# Patient Record
Sex: MALE | Race: WHITE | NOT HISPANIC OR LATINO | Employment: OTHER | URBAN - METROPOLITAN AREA
[De-identification: names, ages, dates, MRNs, and addresses within clinical notes are randomized per-mention and may not be internally consistent; named-entity substitution may affect disease eponyms.]

---

## 2023-04-07 ENCOUNTER — APPOINTMENT (OUTPATIENT)
Dept: CT IMAGING | Facility: HOSPITAL | Age: 48
DRG: 26 | End: 2023-04-07
Payer: COMMERCIAL

## 2023-04-07 ENCOUNTER — APPOINTMENT (OUTPATIENT)
Dept: MRI IMAGING | Facility: HOSPITAL | Age: 48
DRG: 26 | End: 2023-04-07
Payer: COMMERCIAL

## 2023-04-07 ENCOUNTER — HOSPITAL ENCOUNTER (INPATIENT)
Facility: HOSPITAL | Age: 48
LOS: 5 days | Discharge: HOME | DRG: 26 | End: 2023-04-12
Attending: EMERGENCY MEDICINE | Admitting: INTERNAL MEDICINE
Payer: COMMERCIAL

## 2023-04-07 ENCOUNTER — APPOINTMENT (OUTPATIENT)
Dept: GENERAL RADIOLOGY | Facility: HOSPITAL | Age: 48
DRG: 26 | End: 2023-04-07
Payer: COMMERCIAL

## 2023-04-07 ENCOUNTER — APPOINTMENT (OUTPATIENT)
Dept: NEUROLOGY | Facility: HOSPITAL | Age: 48
DRG: 26 | End: 2023-04-07
Payer: COMMERCIAL

## 2023-04-07 DIAGNOSIS — E87.6 HYPOKALEMIA: ICD-10-CM

## 2023-04-07 DIAGNOSIS — R56.9 NEW ONSET SEIZURE: Primary | ICD-10-CM

## 2023-04-07 DIAGNOSIS — Z74.09 IMPAIRED FUNCTIONAL MOBILITY, BALANCE, GAIT, AND ENDURANCE: ICD-10-CM

## 2023-04-07 DIAGNOSIS — R41.0 CONFUSION: ICD-10-CM

## 2023-04-07 DIAGNOSIS — D72.829 LEUKOCYTOSIS, UNSPECIFIED TYPE: ICD-10-CM

## 2023-04-07 DIAGNOSIS — G93.89 BRAIN MASS: ICD-10-CM

## 2023-04-07 PROBLEM — D84.9 IMMUNOSUPPRESSED STATUS: Status: ACTIVE | Noted: 2023-04-07

## 2023-04-07 PROBLEM — Z94.4 LIVER TRANSPLANTED: Status: ACTIVE | Noted: 2023-04-07

## 2023-04-07 LAB
ALBUMIN SERPL-MCNC: 4.2 G/DL (ref 3.5–5.2)
ALBUMIN/GLOB SERPL: 1.8 G/DL
ALP SERPL-CCNC: 48 U/L (ref 39–117)
ALT SERPL W P-5'-P-CCNC: 27 U/L (ref 1–41)
AMMONIA BLD-SCNC: 49 UMOL/L (ref 16–60)
AMPHET+METHAMPHET UR QL: NEGATIVE
AMPHETAMINES UR QL: NEGATIVE
ANION GAP SERPL CALCULATED.3IONS-SCNC: 17 MMOL/L (ref 5–15)
APAP SERPL-MCNC: <5 MCG/ML (ref 0–30)
APPEARANCE CSF: CLEAR
AST SERPL-CCNC: 24 U/L (ref 1–40)
B PARAPERT DNA SPEC QL NAA+PROBE: NOT DETECTED
B PERT DNA SPEC QL NAA+PROBE: NOT DETECTED
BACTERIA UR QL AUTO: NORMAL /HPF
BARBITURATES UR QL SCN: NEGATIVE
BASOPHILS # BLD AUTO: 0.04 10*3/MM3 (ref 0–0.2)
BASOPHILS NFR BLD AUTO: 0.2 % (ref 0–1.5)
BENZODIAZ UR QL SCN: NEGATIVE
BILIRUB SERPL-MCNC: 0.5 MG/DL (ref 0–1.2)
BILIRUB UR QL STRIP: NEGATIVE
BUN SERPL-MCNC: 12 MG/DL (ref 6–20)
BUN/CREAT SERPL: 11.1 (ref 7–25)
BUPRENORPHINE SERPL-MCNC: NEGATIVE NG/ML
C GATTII+NEOFOR DNA CSF QL NAA+NON-PROBE: NOT DETECTED
C PNEUM DNA NPH QL NAA+NON-PROBE: NOT DETECTED
CALCIUM SPEC-SCNC: 8.8 MG/DL (ref 8.6–10.5)
CANNABINOIDS SERPL QL: NEGATIVE
CHLORIDE SERPL-SCNC: 104 MMOL/L (ref 98–107)
CLARITY UR: ABNORMAL
CMV DNA CSF QL NAA+PROBE: NOT DETECTED
CO2 SERPL-SCNC: 19 MMOL/L (ref 22–29)
COCAINE UR QL: NEGATIVE
COLOR CSF: COLORLESS
COLOR SPUN CSF: COLORLESS
COLOR UR: YELLOW
CREAT SERPL-MCNC: 1.08 MG/DL (ref 0.76–1.27)
D-LACTATE SERPL-SCNC: 1.7 MMOL/L (ref 0.5–2)
D-LACTATE SERPL-SCNC: 4 MMOL/L (ref 0.5–2)
D-LACTATE SERPL-SCNC: 6.4 MMOL/L (ref 0.5–2)
DEPRECATED RDW RBC AUTO: 37.5 FL (ref 37–54)
E COLI K1 DNA CSF QL NAA+NON-PROBE: NOT DETECTED
EGFRCR SERPLBLD CKD-EPI 2021: 86.2 ML/MIN/1.73
EOSINOPHIL # BLD AUTO: 0.01 10*3/MM3 (ref 0–0.4)
EOSINOPHIL NFR BLD AUTO: 0.1 % (ref 0.3–6.2)
ERYTHROCYTE [DISTWIDTH] IN BLOOD BY AUTOMATED COUNT: 11.9 % (ref 12.3–15.4)
ETHANOL BLD-MCNC: <10 MG/DL (ref 0–10)
EV RNA CSF QL NAA+PROBE: NOT DETECTED
FLUAV SUBTYP SPEC NAA+PROBE: NOT DETECTED
FLUBV RNA ISLT QL NAA+PROBE: NOT DETECTED
GLOBULIN UR ELPH-MCNC: 2.3 GM/DL
GLUCOSE BLDC GLUCOMTR-MCNC: 83 MG/DL (ref 70–130)
GLUCOSE CSF-MCNC: 88 MG/DL (ref 40–70)
GLUCOSE SERPL-MCNC: 151 MG/DL (ref 65–99)
GLUCOSE UR STRIP-MCNC: NEGATIVE MG/DL
GP B STREP DNA SPEC QL NAA+PROBE: NOT DETECTED
HADV DNA SPEC NAA+PROBE: NOT DETECTED
HAEM INFLU SEROTYP DNA SPEC NAA+PROBE: NOT DETECTED
HCOV 229E RNA SPEC QL NAA+PROBE: NOT DETECTED
HCOV HKU1 RNA SPEC QL NAA+PROBE: NOT DETECTED
HCOV NL63 RNA SPEC QL NAA+PROBE: NOT DETECTED
HCOV OC43 RNA SPEC QL NAA+PROBE: NOT DETECTED
HCT VFR BLD AUTO: 46.4 % (ref 37.5–51)
HGB BLD-MCNC: 16.6 G/DL (ref 13–17.7)
HGB UR QL STRIP.AUTO: ABNORMAL
HHV6 DNA CSF QL NAA+PROBE: NOT DETECTED
HMPV RNA NPH QL NAA+NON-PROBE: NOT DETECTED
HOLD SPECIMEN: NORMAL
HPIV1 RNA ISLT QL NAA+PROBE: NOT DETECTED
HPIV2 RNA SPEC QL NAA+PROBE: NOT DETECTED
HPIV3 RNA NPH QL NAA+PROBE: NOT DETECTED
HPIV4 P GENE NPH QL NAA+PROBE: NOT DETECTED
HSV1 DNA CSF QL NAA+PROBE: NOT DETECTED
HSV2 DNA CSF QL NAA+PROBE: NOT DETECTED
HYALINE CASTS UR QL AUTO: NORMAL /LPF
IMM GRANULOCYTES # BLD AUTO: 0.1 10*3/MM3 (ref 0–0.05)
IMM GRANULOCYTES NFR BLD AUTO: 0.5 % (ref 0–0.5)
INR PPP: 1.08 (ref 0.84–1.13)
KETONES UR QL STRIP: ABNORMAL
L MONOCYTOG RRNA SPEC QL PROBE: NOT DETECTED
LEUKOCYTE ESTERASE UR QL STRIP.AUTO: NEGATIVE
LYMPHOCYTES # BLD AUTO: 0.55 10*3/MM3 (ref 0.7–3.1)
LYMPHOCYTES NFR BLD AUTO: 3 % (ref 19.6–45.3)
M PNEUMO IGG SER IA-ACNC: NOT DETECTED
MAGNESIUM SERPL-MCNC: 1.9 MG/DL (ref 1.6–2.6)
MCH RBC QN AUTO: 31 PG (ref 26.6–33)
MCHC RBC AUTO-ENTMCNC: 35.8 G/DL (ref 31.5–35.7)
MCV RBC AUTO: 86.7 FL (ref 79–97)
METHADONE UR QL SCN: NEGATIVE
MONOCYTES # BLD AUTO: 1.79 10*3/MM3 (ref 0.1–0.9)
MONOCYTES NFR BLD AUTO: 9.8 % (ref 5–12)
MUCOUS THREADS URNS QL MICRO: NORMAL /HPF
N MEN DNA SPEC QL NAA+PROBE: NOT DETECTED
NEUTROPHILS NFR BLD AUTO: 15.73 10*3/MM3 (ref 1.7–7)
NEUTROPHILS NFR BLD AUTO: 86.4 % (ref 42.7–76)
NITRITE UR QL STRIP: NEGATIVE
NRBC BLD AUTO-RTO: 0 /100 WBC (ref 0–0.2)
OPIATES UR QL: NEGATIVE
OXYCODONE UR QL SCN: NEGATIVE
PARECHOVIRUS A RNA CSF QL NAA+NON-PROBE: NOT DETECTED
PCP UR QL SCN: NEGATIVE
PH UR STRIP.AUTO: <=5 [PH] (ref 5–8)
PLATELET # BLD AUTO: 161 10*3/MM3 (ref 140–450)
PMV BLD AUTO: 12.6 FL (ref 6–12)
POTASSIUM SERPL-SCNC: 3.1 MMOL/L (ref 3.5–5.2)
PROCALCITONIN SERPL-MCNC: 0.03 NG/ML (ref 0–0.25)
PROPOXYPH UR QL: NEGATIVE
PROT CSF-MCNC: 30.7 MG/DL (ref 15–45)
PROT SERPL-MCNC: 6.5 G/DL (ref 6–8.5)
PROT UR QL STRIP: ABNORMAL
PROTHROMBIN TIME: 14 SECONDS (ref 11.4–14.4)
QT INTERVAL: 368 MS
QTC INTERVAL: 429 MS
RBC # BLD AUTO: 5.35 10*6/MM3 (ref 4.14–5.8)
RBC # CSF MANUAL: 18 /MM3 (ref 0–5)
RBC # UR STRIP: NORMAL /HPF
REF LAB TEST METHOD: NORMAL
RHINOVIRUS RNA SPEC NAA+PROBE: NOT DETECTED
RSV RNA NPH QL NAA+NON-PROBE: NOT DETECTED
S PNEUM DNA CSF QL NAA+NON-PROBE: NOT DETECTED
SALICYLATES SERPL-MCNC: <0.3 MG/DL
SARS-COV-2 RNA NPH QL NAA+NON-PROBE: NOT DETECTED
SODIUM SERPL-SCNC: 140 MMOL/L (ref 136–145)
SP GR UR STRIP: 1.02 (ref 1–1.03)
SPECIMEN VOL CSF: 8 ML
SQUAMOUS #/AREA URNS HPF: NORMAL /HPF
T4 FREE SERPL-MCNC: 1.22 NG/DL (ref 0.93–1.7)
TRANS CELLS #/AREA URNS HPF: NORMAL /HPF
TRICYCLICS UR QL SCN: NEGATIVE
TROPONIN T SERPL HS-MCNC: 9 NG/L
TSH SERPL DL<=0.05 MIU/L-ACNC: 1.35 UIU/ML (ref 0.27–4.2)
TUBE # CSF: 1
UROBILINOGEN UR QL STRIP: ABNORMAL
VZV DNA CSF QL NAA+PROBE: NOT DETECTED
WBC # CSF MANUAL: 0 /MM3 (ref 0–5)
WBC # UR STRIP: NORMAL /HPF
WBC NRBC COR # BLD: 18.22 10*3/MM3 (ref 3.4–10.8)
WHOLE BLOOD HOLD COAG: NORMAL
WHOLE BLOOD HOLD SPECIMEN: NORMAL

## 2023-04-07 PROCEDURE — 80306 DRUG TEST PRSMV INSTRMNT: CPT | Performed by: EMERGENCY MEDICINE

## 2023-04-07 PROCEDURE — G0480 DRUG TEST DEF 1-7 CLASSES: HCPCS | Performed by: PSYCHIATRY & NEUROLOGY

## 2023-04-07 PROCEDURE — 82945 GLUCOSE OTHER FLUID: CPT | Performed by: EMERGENCY MEDICINE

## 2023-04-07 PROCEDURE — 80053 COMPREHEN METABOLIC PANEL: CPT | Performed by: EMERGENCY MEDICINE

## 2023-04-07 PROCEDURE — 85610 PROTHROMBIN TIME: CPT | Performed by: EMERGENCY MEDICINE

## 2023-04-07 PROCEDURE — 87798 DETECT AGENT NOS DNA AMP: CPT | Performed by: PSYCHIATRY & NEUROLOGY

## 2023-04-07 PROCEDURE — 80197 ASSAY OF TACROLIMUS: CPT | Performed by: INTERNAL MEDICINE

## 2023-04-07 PROCEDURE — 80179 DRUG ASSAY SALICYLATE: CPT | Performed by: EMERGENCY MEDICINE

## 2023-04-07 PROCEDURE — 93005 ELECTROCARDIOGRAM TRACING: CPT | Performed by: EMERGENCY MEDICINE

## 2023-04-07 PROCEDURE — 70553 MRI BRAIN STEM W/O & W/DYE: CPT

## 2023-04-07 PROCEDURE — 83735 ASSAY OF MAGNESIUM: CPT | Performed by: EMERGENCY MEDICINE

## 2023-04-07 PROCEDURE — 89050 BODY FLUID CELL COUNT: CPT | Performed by: EMERGENCY MEDICINE

## 2023-04-07 PROCEDURE — 84145 PROCALCITONIN (PCT): CPT | Performed by: EMERGENCY MEDICINE

## 2023-04-07 PROCEDURE — 83605 ASSAY OF LACTIC ACID: CPT | Performed by: EMERGENCY MEDICINE

## 2023-04-07 PROCEDURE — 87327 CRYPTOCOCCUS NEOFORM AG IA: CPT | Performed by: PHYSICIAN ASSISTANT

## 2023-04-07 PROCEDURE — 70450 CT HEAD/BRAIN W/O DYE: CPT

## 2023-04-07 PROCEDURE — 82077 ASSAY SPEC XCP UR&BREATH IA: CPT | Performed by: EMERGENCY MEDICINE

## 2023-04-07 PROCEDURE — 84443 ASSAY THYROID STIM HORMONE: CPT | Performed by: EMERGENCY MEDICINE

## 2023-04-07 PROCEDURE — 0202U NFCT DS 22 TRGT SARS-COV-2: CPT | Performed by: EMERGENCY MEDICINE

## 2023-04-07 PROCEDURE — 36415 COLL VENOUS BLD VENIPUNCTURE: CPT

## 2023-04-07 PROCEDURE — 80184 ASSAY OF PHENOBARBITAL: CPT | Performed by: PSYCHIATRY & NEUROLOGY

## 2023-04-07 PROCEDURE — 0 GADOBENATE DIMEGLUMINE 529 MG/ML SOLUTION: Performed by: EMERGENCY MEDICINE

## 2023-04-07 PROCEDURE — 87483 CNS DNA AMP PROBE TYPE 12-25: CPT | Performed by: PSYCHIATRY & NEUROLOGY

## 2023-04-07 PROCEDURE — A9577 INJ MULTIHANCE: HCPCS | Performed by: EMERGENCY MEDICINE

## 2023-04-07 PROCEDURE — 84157 ASSAY OF PROTEIN OTHER: CPT | Performed by: EMERGENCY MEDICINE

## 2023-04-07 PROCEDURE — 87070 CULTURE OTHR SPECIMN AEROBIC: CPT | Performed by: EMERGENCY MEDICINE

## 2023-04-07 PROCEDURE — 80143 DRUG ASSAY ACETAMINOPHEN: CPT | Performed by: EMERGENCY MEDICINE

## 2023-04-07 PROCEDURE — 87205 SMEAR GRAM STAIN: CPT | Performed by: EMERGENCY MEDICINE

## 2023-04-07 PROCEDURE — 25010000002 DEXAMETHASONE PER 1 MG: Performed by: INTERNAL MEDICINE

## 2023-04-07 PROCEDURE — 71045 X-RAY EXAM CHEST 1 VIEW: CPT

## 2023-04-07 PROCEDURE — 009U3ZX DRAINAGE OF SPINAL CANAL, PERCUTANEOUS APPROACH, DIAGNOSTIC: ICD-10-PCS | Performed by: EMERGENCY MEDICINE

## 2023-04-07 PROCEDURE — 99223 1ST HOSP IP/OBS HIGH 75: CPT | Performed by: INTERNAL MEDICINE

## 2023-04-07 PROCEDURE — 63710000001 TACROLIMUS PER 1 MG: Performed by: INTERNAL MEDICINE

## 2023-04-07 PROCEDURE — 99285 EMERGENCY DEPT VISIT HI MDM: CPT

## 2023-04-07 PROCEDURE — 80179 DRUG ASSAY SALICYLATE: CPT | Performed by: PSYCHIATRY & NEUROLOGY

## 2023-04-07 PROCEDURE — 82140 ASSAY OF AMMONIA: CPT | Performed by: EMERGENCY MEDICINE

## 2023-04-07 PROCEDURE — 99233 SBSQ HOSP IP/OBS HIGH 50: CPT | Performed by: PSYCHIATRY & NEUROLOGY

## 2023-04-07 PROCEDURE — 0 LEVETIRACETAM IN NACL 0.75% 1000 MG/100ML SOLUTION: Performed by: INTERNAL MEDICINE

## 2023-04-07 PROCEDURE — 84439 ASSAY OF FREE THYROXINE: CPT | Performed by: EMERGENCY MEDICINE

## 2023-04-07 PROCEDURE — 84484 ASSAY OF TROPONIN QUANT: CPT | Performed by: EMERGENCY MEDICINE

## 2023-04-07 PROCEDURE — 85025 COMPLETE CBC W/AUTO DIFF WBC: CPT | Performed by: EMERGENCY MEDICINE

## 2023-04-07 PROCEDURE — 87015 SPECIMEN INFECT AGNT CONCNTJ: CPT | Performed by: EMERGENCY MEDICINE

## 2023-04-07 PROCEDURE — 95819 EEG AWAKE AND ASLEEP: CPT

## 2023-04-07 PROCEDURE — 82962 GLUCOSE BLOOD TEST: CPT

## 2023-04-07 PROCEDURE — 81001 URINALYSIS AUTO W/SCOPE: CPT | Performed by: EMERGENCY MEDICINE

## 2023-04-07 PROCEDURE — 87040 BLOOD CULTURE FOR BACTERIA: CPT | Performed by: EMERGENCY MEDICINE

## 2023-04-07 RX ORDER — SODIUM CHLORIDE 9 MG/ML
40 INJECTION, SOLUTION INTRAVENOUS AS NEEDED
Status: DISCONTINUED | OUTPATIENT
Start: 2023-04-07 | End: 2023-04-11

## 2023-04-07 RX ORDER — LEVETIRACETAM 10 MG/ML
1000 INJECTION INTRAVASCULAR ONCE
Status: COMPLETED | OUTPATIENT
Start: 2023-04-07 | End: 2023-04-07

## 2023-04-07 RX ORDER — LIDOCAINE HYDROCHLORIDE AND EPINEPHRINE 10; 10 MG/ML; UG/ML
10 INJECTION, SOLUTION INFILTRATION; PERINEURAL ONCE
Status: COMPLETED | OUTPATIENT
Start: 2023-04-07 | End: 2023-04-07

## 2023-04-07 RX ORDER — TACROLIMUS 0.5 MG/1
1.5 CAPSULE ORAL 2 TIMES DAILY
COMMUNITY

## 2023-04-07 RX ORDER — ONDANSETRON 2 MG/ML
4 INJECTION INTRAMUSCULAR; INTRAVENOUS EVERY 6 HOURS PRN
Status: DISCONTINUED | OUTPATIENT
Start: 2023-04-07 | End: 2023-04-11 | Stop reason: SDUPTHER

## 2023-04-07 RX ORDER — SODIUM CHLORIDE 0.9 % (FLUSH) 0.9 %
10 SYRINGE (ML) INJECTION AS NEEDED
Status: DISCONTINUED | OUTPATIENT
Start: 2023-04-07 | End: 2023-04-11 | Stop reason: SDUPTHER

## 2023-04-07 RX ORDER — LIDOCAINE HYDROCHLORIDE AND EPINEPHRINE 10; 10 MG/ML; UG/ML
20 INJECTION, SOLUTION INFILTRATION; PERINEURAL ONCE
Status: DISCONTINUED | OUTPATIENT
Start: 2023-04-07 | End: 2023-04-07

## 2023-04-07 RX ORDER — DEXAMETHASONE SODIUM PHOSPHATE 4 MG/ML
4 INJECTION, SOLUTION INTRA-ARTICULAR; INTRALESIONAL; INTRAMUSCULAR; INTRAVENOUS; SOFT TISSUE EVERY 6 HOURS
Status: DISCONTINUED | OUTPATIENT
Start: 2023-04-07 | End: 2023-04-08

## 2023-04-07 RX ORDER — IBUPROFEN 200 MG
400 TABLET ORAL EVERY 6 HOURS PRN
COMMUNITY
End: 2023-04-12 | Stop reason: HOSPADM

## 2023-04-07 RX ORDER — MULTIPLE VITAMINS W/ MINERALS TAB 9MG-400MCG
1 TAB ORAL DAILY
COMMUNITY

## 2023-04-07 RX ORDER — LORAZEPAM 2 MG/ML
2 INJECTION INTRAMUSCULAR
Status: DISCONTINUED | OUTPATIENT
Start: 2023-04-07 | End: 2023-04-12

## 2023-04-07 RX ORDER — LORAZEPAM 2 MG/ML
1 INJECTION INTRAMUSCULAR
Status: DISCONTINUED | OUTPATIENT
Start: 2023-04-07 | End: 2023-04-10 | Stop reason: ALTCHOICE

## 2023-04-07 RX ORDER — POTASSIUM CHLORIDE 1.5 G/1.77G
20 POWDER, FOR SOLUTION ORAL ONCE
Status: DISCONTINUED | OUTPATIENT
Start: 2023-04-07 | End: 2023-04-07

## 2023-04-07 RX ORDER — POTASSIUM CHLORIDE 750 MG/1
20 CAPSULE, EXTENDED RELEASE ORAL ONCE
Status: COMPLETED | OUTPATIENT
Start: 2023-04-07 | End: 2023-04-07

## 2023-04-07 RX ORDER — ACETAMINOPHEN 325 MG/1
650 TABLET ORAL EVERY 4 HOURS PRN
Status: DISCONTINUED | OUTPATIENT
Start: 2023-04-07 | End: 2023-04-12

## 2023-04-07 RX ORDER — LEVETIRACETAM 500 MG/1
1000 TABLET ORAL EVERY 12 HOURS SCHEDULED
Status: DISCONTINUED | OUTPATIENT
Start: 2023-04-08 | End: 2023-04-12 | Stop reason: HOSPADM

## 2023-04-07 RX ORDER — ASPIRIN 81 MG/1
81 TABLET ORAL DAILY
COMMUNITY
End: 2023-04-12 | Stop reason: HOSPADM

## 2023-04-07 RX ORDER — SODIUM CHLORIDE 0.9 % (FLUSH) 0.9 %
10 SYRINGE (ML) INJECTION AS NEEDED
Status: DISCONTINUED | OUTPATIENT
Start: 2023-04-07 | End: 2023-04-11

## 2023-04-07 RX ORDER — SODIUM CHLORIDE 0.9 % (FLUSH) 0.9 %
10 SYRINGE (ML) INJECTION EVERY 12 HOURS SCHEDULED
Status: DISCONTINUED | OUTPATIENT
Start: 2023-04-07 | End: 2023-04-11

## 2023-04-07 RX ADMIN — Medication 10 ML: at 19:13

## 2023-04-07 RX ADMIN — GADOBENATE DIMEGLUMINE 20 ML: 529 INJECTION, SOLUTION INTRAVENOUS at 16:17

## 2023-04-07 RX ADMIN — DEXAMETHASONE SODIUM PHOSPHATE 4 MG: 4 INJECTION INTRA-ARTICULAR; INTRALESIONAL; INTRAMUSCULAR; INTRAVENOUS; SOFT TISSUE at 19:14

## 2023-04-07 RX ADMIN — LEVETIRACETAM 1000 MG: 10 INJECTION INTRAVENOUS at 19:15

## 2023-04-07 RX ADMIN — POTASSIUM CHLORIDE 20 MEQ: 10 CAPSULE, COATED, EXTENDED RELEASE ORAL at 22:06

## 2023-04-07 RX ADMIN — LIDOCAINE HYDROCHLORIDE AND EPINEPHRINE 10 ML: 10; 10 INJECTION, SOLUTION INFILTRATION; PERINEURAL at 13:45

## 2023-04-07 RX ADMIN — TACROLIMUS 1.5 MG: 0.5 CAPSULE ORAL at 19:15

## 2023-04-07 NOTE — Clinical Note
Level of Care: Telemetry [5]   Diagnosis: Seizure [[id number]]   Admitting Physician: SUDHEER MELÉNDEZ [[id number]]   Attending Physician: SUDHEER MELÉNDEZ [[id number]]

## 2023-04-07 NOTE — ED PROVIDER NOTES
Subjective   History of Present Illness  47-year-old male presents for evaluation of altered mental status and new onset seizure.  Of note, the patient is currently confused and is a very limited historian.  According to EMS personnel, the patient is traveling from [place].  He was in [place], [place] earlier this morning visiting family for Nancy when he had new onset seizure witnessed by his wife.  He was reportedly quite confused following the event.  He was flown here from scene.  On EMS arrival, the patient was noted to be very confused and markedly tachycardic.  His mental status improved on the way to our facility.  He was not hypoglycemic.  Awaiting arrival of the patient's wife to further corroborate his history.  According to EMS, the patient has not been well for the past few days and has been confused when compared to his baseline.  His past medical history is unclear at this time.        Review of Systems   Unable to perform ROS: Mental status change   Neurological: Positive for dizziness, seizures and headaches.   Psychiatric/Behavioral: Positive for confusion.   All other systems reviewed and are negative.      Past Medical History:   Diagnosis Date   • Liver cancer        No Known Allergies    History reviewed. No pertinent surgical history.    History reviewed. No pertinent family history.    Social History     Socioeconomic History   • Marital status:            Objective   Physical Exam  Vitals and nursing note reviewed.   Constitutional:       General: He is not in acute distress.     Appearance: He is well-developed. He is not diaphoretic.      Comments: Confused appearing male   HENT:      Head: Normocephalic and atraumatic.      Comments: No tongue laceration present  Eyes:      Pupils: Pupils are equal, round, and reactive to light.   Neck:      Vascular: No JVD.      Comments: No meningeal signs or nuchal rigidity  Cardiovascular:      Rate and Rhythm: Normal rate and  regular rhythm.      Heart sounds: Normal heart sounds. No murmur heard.    No friction rub. No gallop.   Pulmonary:      Effort: Pulmonary effort is normal. No respiratory distress.      Breath sounds: Normal breath sounds. No wheezing or rales.   Abdominal:      General: Bowel sounds are normal. There is no distension.      Palpations: Abdomen is soft. There is no mass.      Tenderness: There is no abdominal tenderness. There is no guarding.   Musculoskeletal:         General: Normal range of motion.      Cervical back: Neck supple.   Skin:     General: Skin is warm and dry.      Coloration: Skin is not pale.      Findings: No erythema or rash.   Neurological:      Mental Status: He is alert.      Comments: Alert to person but not place or year, 5 out of 5 strength in all fours, neurovascularly intact distally in all fours with bounding distal pulses and normal sensation   Psychiatric:         Mood and Affect: Mood normal.         Thought Content: Thought content normal.         Judgment: Judgment normal.         Lumbar Puncture    Date/Time: 4/7/2023 4:19 PM  Performed by: Jan Sarmiento MD  Authorized by: Jan Sarmiento MD     Consent:     Consent obtained:  Verbal and written    Consent given by:  Patient    Risks discussed:  Headache, nerve damage, infection, bleeding, pain and repeat procedure    Alternatives discussed:  No treatment  Universal protocol:     Procedure explained and questions answered to patient or proxy's satisfaction: yes      Relevant documents present and verified: yes      Test results available: yes      Imaging studies available: yes      Required blood products, implants, devices, and special equipment available: yes      Immediately prior to procedure a time out was called: yes      Site/side marked: yes      Patient identity confirmed:  Arm band and verbally with patient  Pre-procedure details:     Procedure purpose:  Diagnostic    Preparation: Patient was prepped and  draped in usual sterile fashion    Anesthesia:     Anesthesia method:  Local infiltration    Local anesthetic:  Lidocaine 1% WITH epi  Procedure details:     Lumbar space:  L4-L5 interspace    Patient position:  Sitting    Needle type:  Spinal needle - Quincke tip    Ultrasound guidance: no      Number of attempts:  1    Fluid appearance:  Clear    Tubes of fluid:  4    Total volume (ml):  12  Post-procedure details:     Puncture site:  Adhesive bandage applied    Procedure completion:  Tolerated well, no immediate complications               ED Course  ED Course as of 04/07/23 1617   Fri Apr 07, 2023   1144 47-year-old male presents for evaluation of altered mental status and new onset seizure.  Of note, the patient is currently confused and is a limited historian.  According to EMS personnel, the patient is traveling from [place].  He was in [place], [place] earlier this morning when he had new onset seizure witnessed by his wife.  He was reportedly quite confused following the event.  He was flown here from scene.  On EMS arrival, the patient was significantly confused and was markedly tachycardic.  His mental status improved significantly on the way here.  On arrival, the patient still appears confused.  He is alert to person but not place or year.  He is able to follow simple commands.  No fevers.  Differential diagnosis is quite broad.  Past medical history is unknown.  Awaiting arrival of family to corroborate the patient's history.  We will obtain labs and imaging, and we will reassess following initial interventions. [DD]   1153 Lactate is significantly elevated at 6.4, consistent with recent seizure.  Oral potassium replacement initiated.  White blood cell count is elevated at 18,000. [DD]   1223 The patient's wife arrived and informed that the patient began exhibiting confusion 4 days ago.  She notes that he has been confused when compared to baseline since that time.  No prior history of  seizures.  She witnessed the seizure this morning.  The patient's mental status continues to improve.  No tongue laceration noted. [DD]   1224 Given his new onset seizure and overall clinical presentation, I feel that he clearly warrants lumbar puncture at this point to rule out meningitis/encephalitis.  We will obtain [DD]   1224 Informed consent and proceed with LP.   [DD]   1350 After obtaining informed consent, lumbar puncture was performed without complication.  The patient tolerated the procedure well.  CSF not suggestive of infection. [DD]   1350 I discussed the patient's case with Dr. Galloway of neurology.  He agreed with admission to the hospital for new onset seizure and has requested that I put in for an MRI brain with and without contrast.  He is also ordering a stat EEG.  I discussed the patient's case with Dr. Henderson, the patient will be admitted under her care for further evaluation and treatment.  The patient is aware/agreeable with the plan at this time. [DD]      ED Course User Index  [DD] Jan Sarmiento MD                                          Recent Results (from the past 24 hour(s))   Comprehensive Metabolic Panel    Collection Time: 04/07/23 10:53 AM    Specimen: Blood   Result Value Ref Range    Glucose 151 (H) 65 - 99 mg/dL    BUN 12 6 - 20 mg/dL    Creatinine 1.08 0.76 - 1.27 mg/dL    Sodium 140 136 - 145 mmol/L    Potassium 3.1 (L) 3.5 - 5.2 mmol/L    Chloride 104 98 - 107 mmol/L    CO2 19.0 (L) 22.0 - 29.0 mmol/L    Calcium 8.8 8.6 - 10.5 mg/dL    Total Protein 6.5 6.0 - 8.5 g/dL    Albumin 4.2 3.5 - 5.2 g/dL    ALT (SGPT) 27 1 - 41 U/L    AST (SGOT) 24 1 - 40 U/L    Alkaline Phosphatase 48 39 - 117 U/L    Total Bilirubin 0.5 0.0 - 1.2 mg/dL    Globulin 2.3 gm/dL    A/G Ratio 1.8 g/dL    BUN/Creatinine Ratio 11.1 7.0 - 25.0    Anion Gap 17.0 (H) 5.0 - 15.0 mmol/L    eGFR 86.2 >60.0 mL/min/1.73   Single High Sensitivity Troponin T    Collection Time: 04/07/23 10:53 AM    Specimen:  Blood   Result Value Ref Range    HS Troponin T 9 <15 ng/L   Magnesium    Collection Time: 04/07/23 10:53 AM    Specimen: Blood   Result Value Ref Range    Magnesium 1.9 1.6 - 2.6 mg/dL   Green Top (Gel)    Collection Time: 04/07/23 10:53 AM   Result Value Ref Range    Extra Tube Hold for add-ons.    Lavender Top    Collection Time: 04/07/23 10:53 AM   Result Value Ref Range    Extra Tube hold for add-on    Gold Top - SST    Collection Time: 04/07/23 10:53 AM   Result Value Ref Range    Extra Tube Hold for add-ons.    Gray Top    Collection Time: 04/07/23 10:53 AM   Result Value Ref Range    Extra Tube Hold for add-ons.    Light Blue Top    Collection Time: 04/07/23 10:53 AM   Result Value Ref Range    Extra Tube Hold for add-ons.    CBC Auto Differential    Collection Time: 04/07/23 10:53 AM    Specimen: Blood   Result Value Ref Range    WBC 18.22 (H) 3.40 - 10.80 10*3/mm3    RBC 5.35 4.14 - 5.80 10*6/mm3    Hemoglobin 16.6 13.0 - 17.7 g/dL    Hematocrit 46.4 37.5 - 51.0 %    MCV 86.7 79.0 - 97.0 fL    MCH 31.0 26.6 - 33.0 pg    MCHC 35.8 (H) 31.5 - 35.7 g/dL    RDW 11.9 (L) 12.3 - 15.4 %    RDW-SD 37.5 37.0 - 54.0 fl    MPV 12.6 (H) 6.0 - 12.0 fL    Platelets 161 140 - 450 10*3/mm3    Neutrophil % 86.4 (H) 42.7 - 76.0 %    Lymphocyte % 3.0 (L) 19.6 - 45.3 %    Monocyte % 9.8 5.0 - 12.0 %    Eosinophil % 0.1 (L) 0.3 - 6.2 %    Basophil % 0.2 0.0 - 1.5 %    Immature Grans % 0.5 0.0 - 0.5 %    Neutrophils, Absolute 15.73 (H) 1.70 - 7.00 10*3/mm3    Lymphocytes, Absolute 0.55 (L) 0.70 - 3.10 10*3/mm3    Monocytes, Absolute 1.79 (H) 0.10 - 0.90 10*3/mm3    Eosinophils, Absolute 0.01 0.00 - 0.40 10*3/mm3    Basophils, Absolute 0.04 0.00 - 0.20 10*3/mm3    Immature Grans, Absolute 0.10 (H) 0.00 - 0.05 10*3/mm3    nRBC 0.0 0.0 - 0.2 /100 WBC   Protime-INR    Collection Time: 04/07/23 10:53 AM    Specimen: Blood   Result Value Ref Range    Protime 14.0 11.4 - 14.4 Seconds    INR 1.08 0.84 - 1.13   Lactic Acid, Plasma     Collection Time: 04/07/23 10:53 AM    Specimen: Blood   Result Value Ref Range    Lactate 6.4 (C) 0.5 - 2.0 mmol/L   Procalcitonin    Collection Time: 04/07/23 10:53 AM    Specimen: Blood   Result Value Ref Range    Procalcitonin 0.03 0.00 - 0.25 ng/mL   T4, Free    Collection Time: 04/07/23 10:53 AM    Specimen: Blood   Result Value Ref Range    Free T4 1.22 0.93 - 1.70 ng/dL   TSH    Collection Time: 04/07/23 10:53 AM    Specimen: Blood   Result Value Ref Range    TSH 1.350 0.270 - 4.200 uIU/mL   Acetaminophen Level    Collection Time: 04/07/23 10:53 AM    Specimen: Blood   Result Value Ref Range    Acetaminophen <5.0 0.0 - 30.0 mcg/mL   Ethanol    Collection Time: 04/07/23 10:53 AM    Specimen: Blood   Result Value Ref Range    Ethanol <10 0 - 10 mg/dL   Salicylate Level    Collection Time: 04/07/23 10:53 AM    Specimen: Blood   Result Value Ref Range    Salicylate <0.3 <=30.0 mg/dL   Ammonia    Collection Time: 04/07/23 10:54 AM    Specimen: Blood   Result Value Ref Range    Ammonia 49 16 - 60 umol/L   ECG 12 Lead ED Triage Standing Order; Weak / Dizzy / AMS    Collection Time: 04/07/23 11:01 AM   Result Value Ref Range    QT Interval 368 ms    QTC Interval 429 ms   Respiratory Panel PCR w/COVID-19(SARS-CoV-2) JESSY/TAMMY/ULISES/PAD/COR/MAD/WALESKA In-House, NP Swab in [facility], 3-4 HR TAT - Swab, Nasopharynx    Collection Time: 04/07/23 12:27 PM    Specimen: Nasopharynx; Swab   Result Value Ref Range    ADENOVIRUS, PCR Not Detected Not Detected    Coronavirus 229E Not Detected Not Detected    Coronavirus HKU1 Not Detected Not Detected    Coronavirus NL63 Not Detected Not Detected    Coronavirus OC43 Not Detected Not Detected    COVID19 Not Detected Not Detected - Ref. Range    Human Metapneumovirus Not Detected Not Detected    Human Rhinovirus/Enterovirus Not Detected Not Detected    Influenza A PCR Not Detected Not Detected    Influenza B PCR Not Detected Not Detected    Parainfluenza Virus 1 Not Detected Not Detected     Parainfluenza Virus 2 Not Detected Not Detected    Parainfluenza Virus 3 Not Detected Not Detected    Parainfluenza Virus 4 Not Detected Not Detected    RSV, PCR Not Detected Not Detected    Bordetella pertussis pcr Not Detected Not Detected    Bordetella parapertussis PCR Not Detected Not Detected    Chlamydophila pneumoniae PCR Not Detected Not Detected    Mycoplasma pneumo by PCR Not Detected Not Detected   Urinalysis With Culture If Indicated - Urine, Clean Catch    Collection Time: 04/07/23 12:27 PM    Specimen: Urine, Clean Catch   Result Value Ref Range    Color, UA Yellow Yellow, Straw    Appearance, UA Cloudy (A) Clear    pH, UA <=5.0 5.0 - 8.0    Specific Gravity, UA 1.018 1.001 - 1.030    Glucose, UA Negative Negative    Ketones, UA Trace (A) Negative    Bilirubin, UA Negative Negative    Blood, UA Moderate (2+) (A) Negative    Protein, UA 30 mg/dL (1+) (A) Negative    Leuk Esterase, UA Negative Negative    Nitrite, UA Negative Negative    Urobilinogen, UA 1.0 E.U./dL 0.2 - 1.0 E.U./dL   Urine Drug Screen - Urine, Clean Catch    Collection Time: 04/07/23 12:27 PM    Specimen: Urine, Clean Catch   Result Value Ref Range    THC, Screen, Urine Negative Negative    Phencyclidine (PCP), Urine Negative Negative    Cocaine Screen, Urine Negative Negative    Methamphetamine, Ur Negative Negative    Opiate Screen Negative Negative    Amphetamine Screen, Urine Negative Negative    Benzodiazepine Screen, Urine Negative Negative    Tricyclic Antidepressants Screen Negative Negative    Methadone Screen, Urine Negative Negative    Barbiturates Screen, Urine Negative Negative    Oxycodone Screen, Urine Negative Negative    Propoxyphene Screen Negative Negative    Buprenorphine, Screen, Urine Negative Negative   Urinalysis, Microscopic Only - Urine, Clean Catch    Collection Time: 04/07/23 12:27 PM    Specimen: Urine, Clean Catch   Result Value Ref Range    RBC, UA 0-2 None Seen, 0-2 /HPF    WBC, UA 0-2 None Seen, 0-2  /HPF    Bacteria, UA None Seen None Seen, Trace /HPF    Squamous Epithelial Cells, UA 0-2 None Seen, 0-2 /HPF    Transitional Epithelial Cells, UA 0-2 0 - 2 /HPF    Hyaline Casts, UA 0-6 0 - 6 /LPF    Mucus, UA Trace None Seen, Trace /HPF    Methodology Manual Light Microscopy    STAT Lactic Acid, Reflex    Collection Time: 04/07/23 12:43 PM    Specimen: Blood   Result Value Ref Range    Lactate 4.0 (C) 0.5 - 2.0 mmol/L   Protein, CSF - Cerebrospinal Fluid, Lumbar Puncture    Collection Time: 04/07/23  1:04 PM    Specimen: Lumbar Puncture; Cerebrospinal Fluid   Result Value Ref Range    Protein, Total (CSF) 30.7 15.0 - 45.0 mg/dL   Glucose, CSF - Cerebrospinal Fluid, Lumbar Puncture    Collection Time: 04/07/23  1:04 PM    Specimen: Lumbar Puncture; Cerebrospinal Fluid   Result Value Ref Range    Glucose, CSF 88 (H) 40 - 70 mg/dL   Culture, CSF - Cerebrospinal Fluid, Lumbar Puncture    Collection Time: 04/07/23  1:04 PM    Specimen: Lumbar Puncture; Cerebrospinal Fluid   Result Value Ref Range    Gram Stain No WBCs or organisms seen    Cell Count, CSF - Cerebrospinal Fluid, Lumbar Puncture    Collection Time: 04/07/23  1:04 PM    Specimen: Lumbar Puncture; Cerebrospinal Fluid   Result Value Ref Range    WBC, CSF 0 0 - 5 /mm3    RBC, CSF 18 (H) 0 - 5 /mm3    Color, CSF Colorless Colorless    Supernatant Color, CSF Colorless Colorless    Appearance, CSF Clear Clear    Volume, CSF 8.0 mL    Tube Number, CSF 1    Meningitis / Encephalitis Panel, PCR - Cerebrospinal Fluid, Lumbar Puncture    Collection Time: 04/07/23  2:18 PM    Specimen: Lumbar Puncture; Cerebrospinal Fluid   Result Value Ref Range    ESCHERICHIA COLI K1, PCR Not Detected Not Detected    HAEMOPHILUS INFLUENZAE, PCR Not Detected Not Detected    LISTERIA MONOCYTOGENES, PCR Not Detected Not Detected    NEISSERIA MENINGITIDIS, PCR Not Detected Not Detected    STREPTOCOCCUS AGALACTIAE, PCR Not Detected Not Detected    STREPTOCOCCUS PNEUMONIAE, PCR Not  Detected Not Detected    CYTOMEGALOVIRUS (CMV), PCR Not Detected Not Detected    ENTEROVIRUS, PCR Not Detected Not Detected    HERPES SIMPLEX VIRUS 1 (HSV-1), PCR Not Detected Not Detected    HERPES SIMPLEX VIRUS 2 (HSV-2), PCR Not Detected Not Detected    HUMAN PARECHOVIRUS, PCR Not Detected Not Detected    VARICELLA ZOSTER VIRUS (VZV), PCR Not Detected Not Detected    CRYPTOCOCCUS NEOFORMANS / GATTII, PCR Not Detected Not Detected    HUMAN HERPES VIRUS 6 PCR Not Detected Not Detected     Note: In addition to lab results from this visit, the labs listed above may include labs taken at another facility or during a different encounter within the last 24 hours. Please correlate lab times with ED admission and discharge times for further clarification of the services performed during this visit.    CT Head Without Contrast   Final Result   Impression:   Faint hypodensity is seen within the left basal ganglia, likely either chronic lacunar infarct or prominent perivascular space.       No acute intracranial abnormality otherwise.      Electronically Signed: Christiano Nino     4/7/2023 12:15 PM EDT     Workstation ID: [id number]      XR Chest 1 View   Final Result   No evidence of acute disease in the chest.       Electronically Signed: Christiano Nino     4/7/2023 11:31 AM EDT     Workstation ID: [id number]      MRI Brain With & Without Contrast    (Results Pending)     Vitals:    04/07/23 1400 04/07/23 1415 04/07/23 1430 04/07/23 1445   BP: 126/84 129/85 131/86 136/84   Pulse: 74 75 70 81   Resp:       Temp:       TempSrc:       SpO2: 97% 97% 98% 96%   Weight:       Height:         Medications   sodium chloride 0.9 % flush 10 mL (has no administration in time range)   potassium chloride (KLOR-CON) packet 20 mEq (has no administration in time range)   levETIRAcetam in NaCl 0.75% (KEPPRA) IVPB 1,000 mg (has no administration in time range)   lidocaine 1% - EPINEPHrine 1:110281 (XYLOCAINE W/EPI) 1 %-1:799292 injection 10 mL (10  mL Injection Given by Other 4/7/23 1345)   gadobenate dimeglumine (MULTIHANCE) injection 20 mL (20 mL Intravenous Given 4/7/23 1617)     ECG/EMG Results (last 24 hours)     Procedure Component Value Units Date/Time    ECG 12 Lead ED Triage Standing Order; Weak / Dizzy / AMS [[id number]] Collected: 04/07/23 1101     Updated: 04/07/23 1604     QT Interval 368 ms      QTC Interval 429 ms     Narrative:      Test Reason : ED Triage Standing Order~  Blood Pressure :   */*   mmHG  Vent. Rate :  82 BPM     Atrial Rate :  82 BPM     P-R Int : 172 ms          QRS Dur : 104 ms      QT Int : 368 ms       P-R-T Axes :  72  13  46 degrees     QTc Int : 429 ms    Normal sinus rhythm  Possible Anterior infarct , age undetermined  Abnormal ECG  No previous ECGs available  Confirmed by MD Sarmiento Michael ([id number]) on 4/7/2023 4:04:21 PM    Referred By: ENEDINA           Confirmed By: Davide Sarmiento MD        ECG 12 Lead ED Triage Standing Order; Weak / Dizzy / AMS   Final Result   Test Reason : ED Triage Standing Order~   Blood Pressure :   */*   mmHG   Vent. Rate :  82 BPM     Atrial Rate :  82 BPM      P-R Int : 172 ms          QRS Dur : 104 ms       QT Int : 368 ms       P-R-T Axes :  72  13  46 degrees      QTc Int : 429 ms      Normal sinus rhythm   Possible Anterior infarct , age undetermined   Abnormal ECG   No previous ECGs available   Confirmed by MD Sarmiento Michael ([id number]) on 4/7/2023 4:04:21 PM      Referred By: ENEDINA           Confirmed By: Davide Sarmiento MD              [facility]    Final diagnoses:   New onset seizure   Confusion   Leukocytosis, unspecified type   Hypokalemia       ED Disposition  ED Disposition     ED Disposition   Decision to Admit    Condition   --    Comment   Level of Care: Telemetry [5]   Diagnosis: Seizures [[id number]]   Admitting Physician: SUDHEER MELÉNDEZ [[id number]]   Attending Physician: SUDHEER MELÉNDEZ [[id number]]   Certification: I Certify That Inpatient Hospital Services Are Medically Necessary For Greater Than 2  Midnights               No follow-up provider specified.       Medication List      No changes were made to your prescriptions during this visit.          Jan Sarmiento MD  04/07/23 0506

## 2023-04-07 NOTE — CONSULTS
DOS: 2023  NAME: Cheo Garrett   : 1975  PCP: Provider, No Known  CC: Status Epilepticus   Referring MD: Dr. Iverson     Neurological Problem and Interval History:  47 y.o. male with a past medical history of liver transplantation for hepatocellular carcinoma in [id number] who presents after seizure-like activity lasting approximately 10 to 15 minutes earlier today.  Neurology consulted for status epilepticus.     Subjective:     At time of exam, Mr. Garrett has mental slowing and confusion. He is initially not oriented to time believing it is 1923 but corrects himself on the year to  with prompting.  He initially confuses the name of his wife, stating that his wife's name is Cheo (his name) but again corrects himself and correctly identifies her as Noreen with prompting.  Additionally, he asks for help with his eyesight but later clarifies that he means he wants his glasses. He is oriented to place and knows that he is in [place]. He knows that Dontae Samano is president.    History is taken from patient's wife, Noreen Garrett. She reports that she has noticed some intermittent confusion since Monday of this week.  She counseled the patient to seek medical treatment earlier this week but he declined at that time. Today, she noticed a period of seizure-like activity while the patient was sleeping and shows video of seizure-like activity accompanied by grunting.  Patient noted to have blood around his mouth and also has evidence of tongue biting on exam.  No loss of bowel or bladder control.  Wife had previously noted some right arm weakness though this appears to have resolved.    Mr. Garrett has medical history only of liver transplantation for hepatocellular carcinoma, for which he takes tacrolimus as his only current active medication.  He does not report fever, chills.    Mr. Garrett states his transplant was secondary to liver cancer brought about by chronic alcohol use.  He denies alcohol or  "illicit drug use since his transplant.    No overt seizure-like activity witnessed during encounter    Past Medical/Surgical Hx:  Past Medical History:   Diagnosis Date   • Liver cancer      History reviewed. No pertinent surgical history.    Review of Systems:    Constitutional: Does not report fever, chills  Cardiovascular: Does not report chest pain, palpitations  Respiratory: Does not report shortness of breath  Gastrointestinal: Does not report nausea, vomiting, abdominal pain  Genitourinary: Does not report dysuria, urinary retention, or urinary frequency  Neurological: Positive for confusion.  Positive for difficulty with speech.  HEENT: Positive for blood from tongue bite      Medications On Admission  (Not in a hospital admission)      Allergies:  No Known Allergies    Social Hx:  Social History     Socioeconomic History   • Marital status:        Family Hx:  History reviewed. No pertinent family history.        Laboratory Results:   Lab Results   Component Value Date    GLUCOSE 151 (H) 04/07/2023    CALCIUM 8.8 04/07/2023     04/07/2023    K 3.1 (L) 04/07/2023    CO2 19.0 (L) 04/07/2023     04/07/2023    BUN 12 04/07/2023    CREATININE 1.08 04/07/2023    BCR 11.1 04/07/2023    ANIONGAP 17.0 (H) 04/07/2023     Lab Results   Component Value Date    WBC 18.22 (H) 04/07/2023    HGB 16.6 04/07/2023    HCT 46.4 04/07/2023    MCV 86.7 04/07/2023     04/07/2023     No results found for: CHOL  No results found for: HDL  No results found for: LDL  No results found for: TRIG  No results found for: HGBA1C  Lab Results   Component Value Date    INR 1.08 04/07/2023    PROTIME 14.0 04/07/2023     No results found for: [id number]  No results found for: FOLATE    Physical Examination:  /85   Pulse 75   Temp 97.7 °F (36.5 °C) (Oral)   Resp 18   Ht 193 cm (76\")   Wt 111 kg (245 lb)   SpO2 97%   BMI 29.82 kg/m²   General Appearance:   Well developed, well nourished, well groomed, alert " with some confusion and mental slowing, and cooperative.  No overt seizure-like activity seen at time of exam.  HEENT: Normocephalic.  Blood noted from tongue bite.  Neck and Spine: Normal range of motion.  Normal alignment.  Extremities:    No edema or deformities. Normal joint ROM. Te  Skin:    No rashes or birth marks.    Neurological examination:  Higher Integrative  Function: Patient has mental slowing and confusion.  He is not initially oriented to time but corrects himself with prompting.  He is oriented to place and is able to identify the president.  Slowed recall, attention span and concentration.  Patient does have trouble confusing words, mixing up his wife's name with his own and using the word eyesight for glasses.   CN II: Pupils are equal, round, and reactive to light. Normal visual acuity and visual fields.    CN III IV VI: Extraocular movements are full without nystagmus.   CN V: Normal facial sensation and strength of muscles of mastication.  CN VII: Facial movements are symmetric. No weakness.  CN VIII:   Auditory acuity is normal.  CN IX & X:   Symmetric palatal movement.  CN XII:   The tongue is midline.  No atrophy or fasciculations.  Motor: Normal muscle strength, bulk and tone in upper and lower extremities.  No fasciculations, rigidity, spasticity, or abnormal movements.  Reflexes: 2+ in the upper and lower extremities. Plantar responses are flexor.  Sensation: Normal to light touch.  al.      Diagnoses / Discussion:  47 y.o. with a past medical history of liver transplant on suppressive therapy with tacrolimus who presents after 1 week of intermittent confusion and 1 episode of seizure-like activity lasting approximately 10 to 15 minutes.      Most likely represents new onset status epilepticus requiring further workup.     Given history of immunosuppression on tacrolimus, with new onset seizure, there would be concern for potential for infection such as meningitis or encephalitis.      However, lumbar puncture shows 0 white blood cells in CSF. EEG demonstrates intermittent left temporal slowing and diffuse cerebral dysfunction with no ongoing seizure-like activity. CT head without contrast demonstrates faint hypodensity in left basal ganglia.    Tacrolimus itself can be associated with neural toxicity and would agree with checking tacrolimus level, though this can happen even at normal levels. If considering alternate agent, discussion with patient's transplant physician would be warranted.     White blood count and lactate are elevated at 18.22 and 6.4, respectively. However, patient is afebrile.  Chest x-ray does not demonstrate evidence of acute disease.  Urinalysis does not demonstrate evidence of urinary tract infection.  Respiratory panel negative.  Procalcitonin normal.  Blood cultures in process. Leukocytosis and elevated lactate may represent stress response in the setting of seizure.    Looking at other causes for new onset status epilepticus, although patient has history of liver transplantation, liver enzymes and ammonia are normal. Likewise, Ethanol, acetaminophen level, salicylate level and UDS negative, and CMP shows no sign of gross electrolyte derangement with only mild hypokalemia. TSH and T4 also within normal limits.      Plan:    -Administer Keppra load and subsequently initiate therapy with 500 mg twice daily  -MRI brain, pending  -Meningitis/encephalitis panel in process  -CSF culture in process  -Obtain tacrolimus level   -Continue to trend white blood count and follow blood cultures  -Fall and seizure precautions    I have discussed the above with the patient and family.  Time spent with patient: 70 minutes in face-to-face evaluation and management of the patient.    Electronically signed by Andrea Burton MD, 04/07/23, 2:13 PM EDT.    Dictated using Dragon dictation.

## 2023-04-07 NOTE — H&P
Psychiatric Medicine Services  HISTORY AND PHYSICAL    Patient Name: Cheo Garrett  : 1975  MRN: [id number]  Primary Care Physician: Edwina, No Known  Date of admission: 2023      Subjective   Subjective     Chief Complaint:  Confusion, seizure    HPI:  Cheo Garrett is a 47 y.o. male with PMHx significant for liver cancer s/p Liver transplant at [facility] () on chronic immunosuppressant therapy who presented today via EMS after wife found him having a seizure at home this morning. Patient had been having some ongoing confusion that started on Monday of this week and has progressively worsened. He denies fever/chills. No headache. He has no prior hx of seizures. He takes no other medications besides his Tacrolimus. He remains confused and thinks he is at [facility]. He does not remember the events from this morning.       Review of Systems   Gen- No fevers, chills  CV- No chest pain, palpitations  Resp- No cough, dyspnea  GI- No N/V/D, abd pain    Personal History     Past Medical History:   Diagnosis Date   • Liver cancer      Past Surgical Hx  - Liver Transplant at [facility]     Family History: family history is not on file.     Social History:    Social History     Social History Narrative   • Not on file   - no smoking, or ETOH use, uses a small amount of chewing tobacco on occasion    Medications:  Available home medication information reviewed.  (Not in a hospital admission)      No Known Allergies    Objective   Objective     Vital Signs:   Temp:  [97.7 °F (36.5 °C)] 97.7 °F (36.5 °C)  Heart Rate:  [76-97] 85  Resp:  [18-20] 18  BP: (111-130)/(72-91) 130/91       Physical Exam   Constitutional: Awake, alert  Eyes: PERRLA, sclerae anicteric, no conjunctival injection  HENT: NCAT, mucous membranes moist  Neck: Supple, no thyromegaly, no lymphadenopathy, trachea midline  Respiratory: Clear to auscultation bilaterally, nonlabored respirations   Cardiovascular:  RRR, no murmurs, rubs, or gallops, palpable pedal pulses bilaterally  Gastrointestinal: Positive bowel sounds, soft, nontender, nondistended  Musculoskeletal: No bilateral ankle edema, no clubbing or cyanosis to extremities  Psychiatric: Appropriate affect, cooperative  Neurologic: Oriented yo person only, mildly confused, strength symmetric in all extremities, Cranial Nerves grossly intact to confrontation, speech clear  Skin: No rashes      Result Review:  I have personally reviewed the results from the time of this admission to 4/7/2023 13:58 EDT and agree with these findings:  [x]  Laboratory list / accordion  [x]  Microbiology  [x]  Radiology  [x]  EKG/Telemetry   [x]  Cardiology/Vascular   [x]  Pathology  []  Old records  []  Other:  Most notable findings include:       LAB RESULTS:      Lab 04/07/23  1243 04/07/23  1053   WBC  --  18.22*   HEMOGLOBIN  --  16.6   HEMATOCRIT  --  46.4   PLATELETS  --  161   NEUTROS ABS  --  15.73*   IMMATURE GRANS (ABS)  --  0.10*   LYMPHS ABS  --  0.55*   MONOS ABS  --  1.79*   EOS ABS  --  0.01   MCV  --  86.7   PROCALCITONIN  --  0.03   LACTATE 4.0* 6.4*   PROTIME  --  14.0   INR  --  1.08         Lab 04/07/23  1053   SODIUM 140   POTASSIUM 3.1*   CHLORIDE 104   CO2 19.0*   ANION GAP 17.0*   BUN 12   CREATININE 1.08   EGFR 86.2   GLUCOSE 151*   CALCIUM 8.8   MAGNESIUM 1.9   TSH 1.350         Lab 04/07/23  1053   TOTAL PROTEIN 6.5   ALBUMIN 4.2   GLOBULIN 2.3   ALT (SGPT) 27   AST (SGOT) 24   BILIRUBIN 0.5   ALK PHOS 48         Lab 04/07/23  1053   HSTROP T 9                 UA    Urinalysis 4/7/23 4/7/23    1227 1227   Squamous Epithelial Cells, UA  0-2   Specific Gravity, UA 1.018    Ketones, UA Trace (A)    Blood, UA Moderate (2+) (A)    Leukocytes, UA Negative    Nitrite, UA Negative    RBC, UA  0-2   WBC, UA  0-2   Bacteria, UA  None Seen   (A) Abnormal value       Comments are available for some flowsheets but are not being displayed.             Microbiology Results  (last 10 days)     Procedure Component Value - Date/Time    Respiratory Panel PCR w/COVID-19(SARS-CoV-2) JESSY/TAMMY/ULISES/PAD/COR/MAD/WALESKA In-House, NP Swab in UTM/VTM, 3-4 HR TAT - Swab, Nasopharynx [[id number]]  (Normal) Collected: 04/07/23 1227    Lab Status: Final result Specimen: Swab from Nasopharynx Updated: 04/07/23 1349     ADENOVIRUS, PCR Not Detected     Coronavirus 229E Not Detected     Coronavirus HKU1 Not Detected     Coronavirus NL63 Not Detected     Coronavirus OC43 Not Detected     COVID19 Not Detected     Human Metapneumovirus Not Detected     Human Rhinovirus/Enterovirus Not Detected     Influenza A PCR Not Detected     Influenza B PCR Not Detected     Parainfluenza Virus 1 Not Detected     Parainfluenza Virus 2 Not Detected     Parainfluenza Virus 3 Not Detected     Parainfluenza Virus 4 Not Detected     RSV, PCR Not Detected     Bordetella pertussis pcr Not Detected     Bordetella parapertussis PCR Not Detected     Chlamydophila pneumoniae PCR Not Detected     Mycoplasma pneumo by PCR Not Detected    Narrative:      In the setting of a positive respiratory panel with a viral infection PLUS a negative procalcitonin without other underlying concern for bacterial infection, consider observing off antibiotics or discontinuation of antibiotics and continue supportive care. If the respiratory panel is positive for atypical bacterial infection (Bordetella pertussis, Chlamydophila pneumoniae, or Mycoplasma pneumoniae), consider antibiotic de-escalation to target atypical bacterial infection.          CT Head Without Contrast    Result Date: 4/7/2023  CT HEAD WO CONTRAST Date of Exam: 4/7/2023 11:50 AM EDT Indication: new onset sz. Comparison: None available. Technique: Axial CT images were obtained of the head without contrast administration.  Reconstructed coronal and sagittal images were also obtained. Automated exposure control and iterative construction methods were used. Findings: Faint hypodensity is  seen within the left basal ganglia, likely either chronic lacunar infarct or prominent perivascular space. There is otherwise no evidence of intracranial hemorrhage, mass or mass effect. The ventricles are normal in size and configuration. The orbits are normal. The paranasal sinuses are grossly clear. The calvarium is intact.     Impression: Impression: Faint hypodensity is seen within the left basal ganglia, likely either chronic lacunar infarct or prominent perivascular space. No acute intracranial abnormality otherwise. Electronically Signed: Christiano Nino  4/7/2023 12:15 PM EDT  Workstation ID: [id number]    XR Chest 1 View    Result Date: 4/7/2023  XR CHEST 1 VW Date of Exam: 4/7/2023 10:53 AM EDT Indication: Weak/Dizzy/AMS triage protocol. Comparison: None available. FINDINGS: No focal airspace opacity. No pleural effusion or pneumothorax. Normal heart and mediastinal contours.     Impression: No evidence of acute disease in the chest. Electronically Signed: Christiano Nino  4/7/2023 11:31 AM EDT  Workstation ID: [id number]          Assessment & Plan   Assessment & Plan     Active Hospital Problems    Diagnosis  POA   • **Seizures [R56.9]  Yes   • Liver transplanted [Z94.4]  Not Applicable   • Immunosuppressed status [D84.9]  Yes     Cheo Garrett is a 47 y.o. male with PMHx significant for liver cancer s/p Liver transplant at [facility] (2013) on chronic immunosuppressant therapy who presented today via EMS after wife found him having a seizure at home this morning.    New Onset Seizure  Confusion  - unclear etiology, worrisome for meningitis in immunosuppressed patient, CSF studies pending  - d/w Dr. Garcia, will load with 1g Keppra  - EEG and MRI brain pending  - CT head with no acute findings  - seizure precautions, ativan PRN  - SLP evaluation, PT/OT    Lactic Acidosis:  - likely secondary to seizures    Hx of Liver Transplant   Immunosuppressive Therapy  - stable and follows with [facility] yearly  - check  Tacrolimus level  - liver enzymes within normal limits    Leukocytosis:  - UA negative, CXR negative, CSF studies pending  - possibly reactive in setting of no fever, normal procal  - check blood cultures    DVT prophylaxis:  SCDs    CODE STATUS:    Code Status and Medical Interventions:   Ordered at: 04/07/23 1354     Level Of Support Discussed With:    Patient     Code Status (Patient has no pulse and is not breathing):    CPR (Attempt to Resuscitate)     Medical Interventions (Patient has pulse or is breathing):    Full Support       Expected Discharge 2-3 days       Roxanne Iverson DO  04/07/23

## 2023-04-08 ENCOUNTER — APPOINTMENT (OUTPATIENT)
Dept: CT IMAGING | Facility: HOSPITAL | Age: 48
DRG: 26 | End: 2023-04-08
Payer: COMMERCIAL

## 2023-04-08 PROBLEM — G93.89 BRAIN MASS: Status: ACTIVE | Noted: 2023-04-08

## 2023-04-08 LAB
ANION GAP SERPL CALCULATED.3IONS-SCNC: 12 MMOL/L (ref 5–15)
BASOPHILS # BLD AUTO: 0.02 10*3/MM3 (ref 0–0.2)
BASOPHILS NFR BLD AUTO: 0.2 % (ref 0–1.5)
BUN SERPL-MCNC: 13 MG/DL (ref 6–20)
BUN/CREAT SERPL: 15.9 (ref 7–25)
CALCIUM SPEC-SCNC: 9.4 MG/DL (ref 8.6–10.5)
CHLORIDE SERPL-SCNC: 105 MMOL/L (ref 98–107)
CO2 SERPL-SCNC: 23 MMOL/L (ref 22–29)
CREAT SERPL-MCNC: 0.82 MG/DL (ref 0.76–1.27)
DEPRECATED RDW RBC AUTO: 36 FL (ref 37–54)
EGFRCR SERPLBLD CKD-EPI 2021: 110.4 ML/MIN/1.73
EOSINOPHIL # BLD AUTO: 0 10*3/MM3 (ref 0–0.4)
EOSINOPHIL NFR BLD AUTO: 0 % (ref 0.3–6.2)
ERYTHROCYTE [DISTWIDTH] IN BLOOD BY AUTOMATED COUNT: 11.8 % (ref 12.3–15.4)
GLUCOSE SERPL-MCNC: 153 MG/DL (ref 65–99)
HCT VFR BLD AUTO: 47.4 % (ref 37.5–51)
HGB BLD-MCNC: 17.2 G/DL (ref 13–17.7)
IMM GRANULOCYTES # BLD AUTO: 0.04 10*3/MM3 (ref 0–0.05)
IMM GRANULOCYTES NFR BLD AUTO: 0.4 % (ref 0–0.5)
LYMPHOCYTES # BLD AUTO: 1.3 10*3/MM3 (ref 0.7–3.1)
LYMPHOCYTES NFR BLD AUTO: 13.1 % (ref 19.6–45.3)
MCH RBC QN AUTO: 30.9 PG (ref 26.6–33)
MCHC RBC AUTO-ENTMCNC: 36.3 G/DL (ref 31.5–35.7)
MCV RBC AUTO: 85.3 FL (ref 79–97)
MONOCYTES # BLD AUTO: 0.38 10*3/MM3 (ref 0.1–0.9)
MONOCYTES NFR BLD AUTO: 3.8 % (ref 5–12)
NEUTROPHILS NFR BLD AUTO: 8.2 10*3/MM3 (ref 1.7–7)
NEUTROPHILS NFR BLD AUTO: 82.5 % (ref 42.7–76)
NRBC BLD AUTO-RTO: 0 /100 WBC (ref 0–0.2)
PLATELET # BLD AUTO: 168 10*3/MM3 (ref 140–450)
PMV BLD AUTO: 12.6 FL (ref 6–12)
POTASSIUM SERPL-SCNC: 4 MMOL/L (ref 3.5–5.2)
RBC # BLD AUTO: 5.56 10*6/MM3 (ref 4.14–5.8)
SODIUM SERPL-SCNC: 140 MMOL/L (ref 136–145)
WBC NRBC COR # BLD: 9.94 10*3/MM3 (ref 3.4–10.8)

## 2023-04-08 PROCEDURE — 71270 CT THORAX DX C-/C+: CPT

## 2023-04-08 PROCEDURE — 25510000001 IOPAMIDOL 61 % SOLUTION: Performed by: INTERNAL MEDICINE

## 2023-04-08 PROCEDURE — 80048 BASIC METABOLIC PNL TOTAL CA: CPT | Performed by: INTERNAL MEDICINE

## 2023-04-08 PROCEDURE — 63710000001 TACROLIMUS PER 1 MG: Performed by: INTERNAL MEDICINE

## 2023-04-08 PROCEDURE — 87899 AGENT NOS ASSAY W/OPTIC: CPT | Performed by: PHYSICIAN ASSISTANT

## 2023-04-08 PROCEDURE — 92610 EVALUATE SWALLOWING FUNCTION: CPT

## 2023-04-08 PROCEDURE — 74178 CT ABD&PLV WO CNTR FLWD CNTR: CPT

## 2023-04-08 PROCEDURE — 87449 NOS EACH ORGANISM AG IA: CPT | Performed by: INTERNAL MEDICINE

## 2023-04-08 PROCEDURE — 87385 HISTOPLASMA CAPSUL AG IA: CPT | Performed by: INTERNAL MEDICINE

## 2023-04-08 PROCEDURE — 85025 COMPLETE CBC W/AUTO DIFF WBC: CPT | Performed by: INTERNAL MEDICINE

## 2023-04-08 PROCEDURE — 92523 SPEECH SOUND LANG COMPREHEN: CPT

## 2023-04-08 PROCEDURE — 25010000002 DEXAMETHASONE PER 1 MG: Performed by: INTERNAL MEDICINE

## 2023-04-08 PROCEDURE — 99232 SBSQ HOSP IP/OBS MODERATE 35: CPT | Performed by: PSYCHIATRY & NEUROLOGY

## 2023-04-08 PROCEDURE — 99233 SBSQ HOSP IP/OBS HIGH 50: CPT | Performed by: INTERNAL MEDICINE

## 2023-04-08 PROCEDURE — 97530 THERAPEUTIC ACTIVITIES: CPT

## 2023-04-08 PROCEDURE — 97162 PT EVAL MOD COMPLEX 30 MIN: CPT

## 2023-04-08 RX ADMIN — DEXAMETHASONE SODIUM PHOSPHATE 4 MG: 4 INJECTION INTRA-ARTICULAR; INTRALESIONAL; INTRAMUSCULAR; INTRAVENOUS; SOFT TISSUE at 00:31

## 2023-04-08 RX ADMIN — Medication 10 ML: at 20:20

## 2023-04-08 RX ADMIN — Medication 10 ML: at 08:43

## 2023-04-08 RX ADMIN — IOPAMIDOL 100 ML: 612 INJECTION, SOLUTION INTRAVENOUS at 19:21

## 2023-04-08 RX ADMIN — LEVETIRACETAM 1000 MG: 500 TABLET, FILM COATED ORAL at 08:42

## 2023-04-08 RX ADMIN — TACROLIMUS 1.5 MG: 0.5 CAPSULE ORAL at 20:17

## 2023-04-08 RX ADMIN — TACROLIMUS 1.5 MG: 0.5 CAPSULE ORAL at 08:42

## 2023-04-08 RX ADMIN — LEVETIRACETAM 1000 MG: 500 TABLET, FILM COATED ORAL at 20:15

## 2023-04-08 RX ADMIN — DEXAMETHASONE SODIUM PHOSPHATE 4 MG: 4 INJECTION INTRA-ARTICULAR; INTRALESIONAL; INTRAMUSCULAR; INTRAVENOUS; SOFT TISSUE at 06:08

## 2023-04-08 NOTE — PROGRESS NOTES
[facility] Medicine Services  PROGRESS NOTE    Patient Name: Cheo Garrett  : 1975  MRN: [id number]    Date of Admission: 2023  Primary Care Physician: Provider, No Known    Subjective   Subjective     CC:  F/u seizures    HPI:  Patient resting in bedside chair, doing okay, still with some forgetful moments, no further seizures thus far. Wife supportive and at bedside. We discussed the results of the scan.    ROS:  Gen- No fevers, chills  CV- No chest pain, palpitations  Resp- No cough, dyspnea  GI- No N/V/D, abd pain    Objective   Objective     Vital Signs:   Temp:  [97.7 °F (36.5 °C)-98.4 °F (36.9 °C)] 97.7 °F (36.5 °C)  Heart Rate:  [67-97] 83  Resp:  [18] 18  BP: (126-142)/(73-92) 134/91     Physical Exam:  Constitutional: No acute distress, awake, alert  HENT: NCAT, mucous membranes moist  Respiratory: Clear to auscultation bilaterally, respiratory effort normal   Cardiovascular: RRR, no murmurs, rubs, or gallops  Gastrointestinal: Positive bowel sounds, soft, nontender, nondistended  Musculoskeletal: No bilateral ankle edema  Psychiatric: Appropriate affect, cooperative  Neurologic: Oriented x3, speech clear, no focal deficits currently  Skin: No rashes      Results Reviewed:  LAB RESULTS:      Lab 23  0538 23  1826 23  1243 23  1053   WBC 9.94  --   --  18.22*   HEMOGLOBIN 17.2  --   --  16.6   HEMATOCRIT 47.4  --   --  46.4   PLATELETS 168  --   --  161   NEUTROS ABS 8.20*  --   --  15.73*   IMMATURE GRANS (ABS) 0.04  --   --  0.10*   LYMPHS ABS 1.30  --   --  0.55*   MONOS ABS 0.38  --   --  1.79*   EOS ABS 0.00  --   --  0.01   MCV 85.3  --   --  86.7   PROCALCITONIN  --   --   --  0.03   LACTATE  --  1.7 4.0* 6.4*   PROTIME  --   --   --  14.0         Lab 23  0538 23  1053   SODIUM 140 140   POTASSIUM 4.0 3.1*   CHLORIDE 105 104   CO2 23.0 19.0*   ANION GAP 12.0 17.0*   BUN 13 12   CREATININE 0.82 1.08   EGFR 110.4 86.2   GLUCOSE  153* 151*   CALCIUM 9.4 8.8   MAGNESIUM  --  1.9   TSH  --  1.350         Lab 04/07/23  1053   TOTAL PROTEIN 6.5   ALBUMIN 4.2   GLOBULIN 2.3   ALT (SGPT) 27   AST (SGOT) 24   BILIRUBIN 0.5   ALK PHOS 48         Lab 04/07/23  1053   HSTROP T 9   PROTIME 14.0   INR 1.08                 Brief Urine Lab Results  (Last result in the past 365 days)      Color   Clarity   Blood   Leuk Est   Nitrite   Protein   CREAT   Urine HCG        04/07/23 1227 Yellow   Cloudy   Moderate (2+)   Negative   Negative   30 mg/dL (1+)                 Microbiology Results Abnormal     Procedure Component Value - Date/Time    Culture, CSF - Cerebrospinal Fluid, Lumbar Puncture [[id number]] Collected: 04/07/23 1304    Lab Status: Preliminary result Specimen: Cerebrospinal Fluid from Lumbar Puncture Updated: 04/08/23 0703     CSF Culture No growth     Gram Stain No WBCs or organisms seen    Meningitis / Encephalitis Panel, PCR - Cerebrospinal Fluid, Lumbar Puncture [[id number]]  (Normal) Collected: 04/07/23 1418    Lab Status: Final result Specimen: Cerebrospinal Fluid from Lumbar Puncture Updated: 04/07/23 1553     ESCHERICHIA COLI K1, PCR Not Detected     HAEMOPHILUS INFLUENZAE, PCR Not Detected     LISTERIA MONOCYTOGENES, PCR Not Detected     NEISSERIA MENINGITIDIS, PCR Not Detected     STREPTOCOCCUS AGALACTIAE, PCR Not Detected     STREPTOCOCCUS PNEUMONIAE, PCR Not Detected     CYTOMEGALOVIRUS (CMV), PCR Not Detected     ENTEROVIRUS, PCR Not Detected     HERPES SIMPLEX VIRUS 1 (HSV-1), PCR Not Detected     HERPES SIMPLEX VIRUS 2 (HSV-2), PCR Not Detected     HUMAN PARECHOVIRUS, PCR Not Detected     VARICELLA ZOSTER VIRUS (VZV), PCR Not Detected     CRYPTOCOCCUS NEOFORMANS / GATTII, PCR Not Detected     HUMAN HERPES VIRUS 6 PCR Not Detected    Respiratory Panel PCR w/COVID-19(SARS-CoV-2) JESSY/TAMMY/ULISES/PAD/COR/MAD/WALESKA In-House, NP Swab in UTM/VTM, 3-4 HR TAT - Swab, Nasopharynx [[id number]]  (Normal) Collected: 04/07/23 1227    Lab Status: Final  result Specimen: Swab from Nasopharynx Updated: 04/07/23 1349     ADENOVIRUS, PCR Not Detected     Coronavirus 229E Not Detected     Coronavirus HKU1 Not Detected     Coronavirus NL63 Not Detected     Coronavirus OC43 Not Detected     COVID19 Not Detected     Human Metapneumovirus Not Detected     Human Rhinovirus/Enterovirus Not Detected     Influenza A PCR Not Detected     Influenza B PCR Not Detected     Parainfluenza Virus 1 Not Detected     Parainfluenza Virus 2 Not Detected     Parainfluenza Virus 3 Not Detected     Parainfluenza Virus 4 Not Detected     RSV, PCR Not Detected     Bordetella pertussis pcr Not Detected     Bordetella parapertussis PCR Not Detected     Chlamydophila pneumoniae PCR Not Detected     Mycoplasma pneumo by PCR Not Detected    Narrative:      In the setting of a positive respiratory panel with a viral infection PLUS a negative procalcitonin without other underlying concern for bacterial infection, consider observing off antibiotics or discontinuation of antibiotics and continue supportive care. If the respiratory panel is positive for atypical bacterial infection (Bordetella pertussis, Chlamydophila pneumoniae, or Mycoplasma pneumoniae), consider antibiotic de-escalation to target atypical bacterial infection.          EEG    Result Date: 4/7/2023  Reason for referral: 47 y.o.male with seizure, altered mental status Technical Summary:  A 19 channel digital EEG was performed using the international 10-20 placement system, including eye leads and EKG leads. Duration: 22 minutes Findings: The patient is awake.  The background shows diffuse medium amplitude 4-6 Hz intermixed delta and theta activity which is present symmetrically over both hemispheres.  A clear posterior rhythm is not seen.  EMG artifact is variably prominent anteriorly.  Photic stimulation does not change the background.  Hyperventilation is not performed.  Sleep is seen with slowing of the tracing and vertex waves and  sleep spindles.  No focal features are seen.  No epileptiform activity is present.  No ongoing seizures are seen. Video: Available Technical quality: Superior EKG: Regular, 70 beats per SUMMARY: Mild-moderate generalized slow No focal features or epileptiform activity are seen     Impression: Diffuse cerebral dysfunction of mild degree but nonspecific No ongoing seizures are seen This report is transcribed using the Dragon dictation system.      CT Head Without Contrast    Result Date: 4/7/2023  CT HEAD WO CONTRAST Date of Exam: 4/7/2023 11:50 AM EDT Indication: new onset sz. Comparison: None available. Technique: Axial CT images were obtained of the head without contrast administration.  Reconstructed coronal and sagittal images were also obtained. Automated exposure control and iterative construction methods were used. Findings: Faint hypodensity is seen within the left basal ganglia, likely either chronic lacunar infarct or prominent perivascular space. There is otherwise no evidence of intracranial hemorrhage, mass or mass effect. The ventricles are normal in size and configuration. The orbits are normal. The paranasal sinuses are grossly clear. The calvarium is intact.     Impression: Impression: Faint hypodensity is seen within the left basal ganglia, likely either chronic lacunar infarct or prominent perivascular space. No acute intracranial abnormality otherwise. Electronically Signed: Christiano Nino  4/7/2023 12:15 PM EDT  Workstation ID: [id number]    MRI Brain With & Without Contrast    Result Date: 4/7/2023  MRI BRAIN W WO CONTRAST Date of Exam: 4/7/2023 3:30 PM EDT Indication: new onset sz protocol.  Comparison: CT earlier the same day Technique:  Routine multiplanar/multisequence sequence images of the brain were obtained before and after the uneventful administration of  20 mL Multihance. Findings: Corresponding to the area of apparent low density within the left basal ganglia seen on same-day CT, there  is a somewhat irregular, partially cystic and avidly enhancing mass measuring 2.2 x 2.0 cm. There is either a separate nodule or small satellite nodule present posteriorly measuring 8 mm, also enhancing. Moderate surrounding edema is present on FLAIR sequences, extending to involve the left medial temporal lobe. No additional mass or abnormal enhancement is present. The ventricles are normal in size and configuration. No acute infarct is present on diffusion weighted sequences. The orbits are normal. The paranasal sinuses are grossly clear.     Impression: Impression: 2.2 cm enhancing and partially cystic mass of the left basal ganglia as above, with surrounding edema extending towards the insular and left temporal regions, favoring primary glial neoplasm over metastasis. Recommend neurosurgical consultation. Electronically Signed: Christiano Nino  4/7/2023 4:32 PM EDT  Workstation ID: [id number]    XR Chest 1 View    Result Date: 4/7/2023  XR CHEST 1 VW Date of Exam: 4/7/2023 10:53 AM EDT Indication: Weak/Dizzy/AMS triage protocol. Comparison: None available. FINDINGS: No focal airspace opacity. No pleural effusion or pneumothorax. Normal heart and mediastinal contours.     Impression: No evidence of acute disease in the chest. Electronically Signed: Christiano Nino  4/7/2023 11:31 AM EDT  Workstation ID: [id number]          Current medications:  Scheduled Meds:levETIRAcetam, 1,000 mg, Oral, Q12H  sodium chloride, 10 mL, Intravenous, Q12H  tacrolimus, 1.5 mg, Oral, Q12H      Continuous Infusions:   PRN Meds:.•  acetaminophen  •  Calcium Replacement - Follow Nurse / BPA Driven Protocol  •  LORazepam  •  LORazepam  •  Magnesium Standard Dose Replacement - Follow Nurse / BPA Driven Protocol  •  ondansetron  •  Phosphorus Replacement - Follow Nurse / BPA Driven Protocol  •  Potassium Replacement - Follow Nurse / BPA Driven Protocol  •  sodium chloride  •  sodium chloride  •  sodium chloride    Assessment & Plan   Assessment &  Plan     Active Hospital Problems    Diagnosis  POA   • **Seizures [R56.9]  Yes   • Liver transplanted [Z94.4]  Not Applicable   • Immunosuppressed status [D84.9]  Yes      Resolved Hospital Problems   No resolved problems to display.        Brief Hospital Course to date:  Cheo Garrett is a 47 y.o. male with PMHx significant for liver cancer s/p Liver transplant at [facility] (2013) on chronic immunosuppressant therapy who presented today via EMS after wife found him having a seizure at home this morning.     Brain Mass  New Onset Seizures  Confusion  - MRI brain with 2.2 cm enhancing and partially cystic mass of the left basal ganglia with surrounding edema extending towards the insular and left temporal regions, favoring primary glial neoplasm   - d/w Dr. Garcia yesterday, continue keppra BID  - appreciate Neurosurgery input, recommend biopsy. Wife/patient not from [place] and may desire to pursue any type of operative intervention closer to home. Okay with discontinuing steroids  - CT chest/abd/pelvis  - ID consult per NSGY recommendations to rule out infectious etiology, although CSF work-up thus far appear benign  - seizure precautions, ativan PRN  - SLP evaluation, PT/OT     Lactic Acidosis:  - likely secondary to seizures, now resolved     Hx of Liver Transplant   Hx of Liver Cancer (data deficient)  Immunosuppressive Therapy  - stable and follows with [facility] yearly  - Tacrolimus level pending  - liver enzymes within normal limits     Leukocytosis - resolved  - UA negative, CXR negative, CSF studies negative  - presume reactive in setting of no fever, normal procal  - blood cultures pending    Expected Discharge Location and Transportation: Home  Expected Discharge   pending further work-up     DVT prophylaxis:  Mechanical DVT prophylaxis orders are present.     AM-PAC 6 Clicks Score (PT): 20 (04/08/23 3770)    CODE STATUS:   Code Status and Medical Interventions:   Ordered at: 04/07/23 2891      Level Of Support Discussed With:    Patient     Code Status (Patient has no pulse and is not breathing):    CPR (Attempt to Resuscitate)     Medical Interventions (Patient has pulse or is breathing):    Full Support       Roxanne Iverson DO  04/08/23

## 2023-04-08 NOTE — CONSULTS
INFECTIOUS DISEASE CONSULT/INITIAL HOSPITAL VISIT    Cheo Garrett  1975  [id number]    Date of Consult: 4/8/2023    Admission Date: 4/7/2023      Requesting Provider: Roxanne Iverson DO  Evaluating Physician: Duncan Pradhan MD    Reason for Consultation: immunocompromised, brain lesion    History of present illness:    Patient is a 47 y.o. male with h/o liver cancer and liver transplantation 2013 at [facility]/on Tacrolimus who we were asked to see for possible infectious brain lesion.  The patient was brought to [facility] ED on 4/7 by EMS after his wife witness the patient having a seizure at her family's home. They are from [place], but are visiting family near [place], [place].   The patient has no h/o seizures and has been relatively healthy since his liver transplantation.   He states he does no remember leading up to his being taken to [facility].  On arrival, the patient is afebrile.  Admitting labs are WBC 18,200 with 86% neutrophils, lactic acid 6.4, PCT 0.03, Creatinine 1.08, ammonia 49, and UA WBC 0-2.  A UDS was negative.  A respiratory panel PCR was negative.  Blood cultures are negative to date. A CXR showed no acute findings.  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  He underwent an LP yesterday with WBC CSF zero, glucose 88, and protein 30.7. A Meningitis/encephalitis panel PCR was negative.  A toxoplasma PCR from CSF is pending.  A CSF culture is negative to date. He is currently on no antibiotics. ID was asked to evaluate.  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     Past Medical History:   Diagnosis Date   • Liver cancer        History reviewed. No pertinent surgical history.   Liver transplantation 2013    History reviewed. No pertinent family history.    Social History     Socioeconomic History   • Marital status:    Denies tobacco, alcohol, or illicit drug  use.   Lives in    No Known Allergies      Medication:    Current Facility-Administered Medications:   •  acetaminophen (TYLENOL) tablet 650 mg, 650 mg, Oral, Q4H PRN, Roxanne Iverson, DO  •  Calcium Replacement - Follow Nurse / BPA Driven Protocol, , Does not apply, PRN, Roxanne Iverson R, DO  •  levETIRAcetam (KEPPRA) tablet 1,000 mg, 1,000 mg, Oral, Q12H, Alyssa Galloway MD, 1,000 mg at 04/08/23 0842  •  LORazepam (ATIVAN) injection 1 mg, 1 mg, Intravenous, Q2H PRN, Roxanne Iverson, DO  •  LORazepam (ATIVAN) injection 2 mg, 2 mg, Intravenous, Q5 Min PRN, Alyssa Galloway MD  •  Magnesium Standard Dose Replacement - Follow Nurse / BPA Driven Protocol, , Does not apply, PRN, Roxanne Iverson, DO  •  ondansetron (ZOFRAN) injection 4 mg, 4 mg, Intravenous, Q6H PRN, Roxanne Iverson, DO  •  Phosphorus Replacement - Follow Nurse / BPA Driven Protocol, , Does not apply, PRN, Roxanne Iverson, DO  •  Potassium Replacement - Follow Nurse / BPA Driven Protocol, , Does not apply, PRN, Roxanne Iverson, DO  •  sodium chloride 0.9 % flush 10 mL, 10 mL, Intravenous, PRN, Roxanne Iverson, DO, 10 mL at 04/07/23 1913  •  sodium chloride 0.9 % flush 10 mL, 10 mL, Intravenous, Q12H, Roxanne Iverson, DO, 10 mL at 04/08/23 0843  •  sodium chloride 0.9 % flush 10 mL, 10 mL, Intravenous, PRN, Roxanne Iverson, DO  •  sodium chloride 0.9 % infusion 40 mL, 40 mL, Intravenous, PRN, Roxanne Iverson, DO  •  tacrolimus (PROGRAF) capsule 1.5 mg, 1.5 mg, Oral, Q12H, Roxanne Iverson, DO, 1.5 mg at 04/08/23 0842    Antibiotics:  Anti-Infectives (From admission, onward)    None            Review of Systems:  Constitutional-- No Fever, chills or sweats.  Appetite good, and no malaise. No fatigue.  HEENT-- No new vision, hearing or throat complaints.  No epistaxis or oral sores.  Denies odynophagia or dysphagia. No headache, photophobia or neck stiffness.  CV-- No chest pain, palpitation or syncope  Resp-- No  SOB/cough/Hemoptysis  GI- No nausea, vomiting, or diarrhea.  No hematochezia, melena, or hematemesis. Denies jaundice or chronic liver disease.  -- No dysuria, hematuria, or flank pain.  Denies hesitancy, urgency or burning with urination.   Lymph- no swollen lymph nodes in neck/axilla or groin.   Heme- No active bruising or bleeding; no Hx of DVT or PE.  MS-- no swelling or pain in the bones or joints of arms/legs.  No new back pain.  Neuro-- No acute focal weakness or numbness in the arms or legs.   Skin--No rashes or lesions      Physical Exam:   Vital Signs  Temp (24hrs), Av.9 °F (36.6 °C), Min:97.7 °F (36.5 °C), Max:98 °F (36.7 °C)    Temp  Min: 97.7 °F (36.5 °C)  Max: 98 °F (36.7 °C)  BP  Min: 128/73  Max: 140/92  Pulse  Min: 67  Max: 97  Resp  Min: 18  Max: 18  SpO2  Min: 93 %  Max: 97 %    GENERAL: Awake and alert, in no acute distress.   HEENT: Normocephalic, atraumatic.  PERRL. EOMI. No conjunctival injection. No icterus. Oropharynx clear without evidence of thrush or exudate.  NECK: Supple without nuchal rigidity. No mass.  LYMPH: No cervical, axillary or inguinal lymphadenopathy.  HEART: RRR; No murmur, rubs, gallops.   LUNGS: Clear to auscultation bilaterally without wheezing, rales, rhonchi. Normal respiratory effort. Nonlabored.  ABDOMEN: Soft, nontender, nondistended. Positive bowel sounds. No rebound or guarding. NO mass or HSM.  EXT:  No cyanosis, clubbing or edema. No cord.  :  Without Bailey catheter.  MSK: No joint effusions or erythema  SKIN: Warm and dry without cutaneous eruptions on Inspection/palpation.    NEURO: Oriented to PPT.  Motor 5/5 strength  PSYCHIATRIC: Normal insight and judgment. Cooperative with PE    Laboratory Data    Results from last 7 days   Lab Units 23  0538 23  1053   WBC 10*3/mm3 9.94 18.22*   HEMOGLOBIN g/dL 17.2 16.6   HEMATOCRIT % 47.4 46.4   PLATELETS 10*3/mm3 168 161     Results from last 7 days   Lab Units 23  0538   SODIUM mmol/L 140    POTASSIUM mmol/L 4.0   CHLORIDE mmol/L 105   CO2 mmol/L 23.0   BUN mg/dL 13   CREATININE mg/dL 0.82   GLUCOSE mg/dL 153*   CALCIUM mg/dL 9.4     Results from last 7 days   Lab Units 04/07/23  1053   ALK PHOS U/L 48   BILIRUBIN mg/dL 0.5   ALT (SGPT) U/L 27   AST (SGOT) U/L 24             Results from last 7 days   Lab Units 04/07/23  1826   LACTATE mmol/L 1.7             Estimated Creatinine Clearance: 154 mL/min (by C-G formula based on SCr of 0.82 mg/dL).      Microbiology:  Microbiology Results (last 10 days)     Procedure Component Value - Date/Time    Meningitis / Encephalitis Panel, PCR - Cerebrospinal Fluid, Lumbar Puncture [[id number]]  (Normal) Collected: 04/07/23 1418    Lab Status: Final result Specimen: Cerebrospinal Fluid from Lumbar Puncture Updated: 04/07/23 1553     ESCHERICHIA COLI K1, PCR Not Detected     HAEMOPHILUS INFLUENZAE, PCR Not Detected     LISTERIA MONOCYTOGENES, PCR Not Detected     NEISSERIA MENINGITIDIS, PCR Not Detected     STREPTOCOCCUS AGALACTIAE, PCR Not Detected     STREPTOCOCCUS PNEUMONIAE, PCR Not Detected     CYTOMEGALOVIRUS (CMV), PCR Not Detected     ENTEROVIRUS, PCR Not Detected     HERPES SIMPLEX VIRUS 1 (HSV-1), PCR Not Detected     HERPES SIMPLEX VIRUS 2 (HSV-2), PCR Not Detected     HUMAN PARECHOVIRUS, PCR Not Detected     VARICELLA ZOSTER VIRUS (VZV), PCR Not Detected     CRYPTOCOCCUS NEOFORMANS / GATTII, PCR Not Detected     HUMAN HERPES VIRUS 6 PCR Not Detected    Culture, CSF - Cerebrospinal Fluid, Lumbar Puncture [[id number]] Collected: 04/07/23 1304    Lab Status: Preliminary result Specimen: Cerebrospinal Fluid from Lumbar Puncture Updated: 04/08/23 0703     CSF Culture No growth     Gram Stain No WBCs or organisms seen    Blood Culture - Blood, Hand, Right [[id number]]  (Normal) Collected: 04/07/23 1243    Lab Status: Preliminary result Specimen: Blood from Hand, Right Updated: 04/08/23 1301     Blood Culture No growth at 24 hours    Blood Culture - Blood, Hand,  Left [[id number]]  (Normal) Collected: 04/07/23 1243    Lab Status: Preliminary result Specimen: Blood from Hand, Left Updated: 04/08/23 1301     Blood Culture No growth at 24 hours    Respiratory Panel PCR w/COVID-19(SARS-CoV-2) JESSY/TAMMY/ULISES/PAD/COR/MAD/WALESKA In-House, NP Swab in UTM/VTM, 3-4 HR TAT - Swab, Nasopharynx [[id number]]  (Normal) Collected: 04/07/23 1227    Lab Status: Final result Specimen: Swab from Nasopharynx Updated: 04/07/23 1349     ADENOVIRUS, PCR Not Detected     Coronavirus 229E Not Detected     Coronavirus HKU1 Not Detected     Coronavirus NL63 Not Detected     Coronavirus OC43 Not Detected     COVID19 Not Detected     Human Metapneumovirus Not Detected     Human Rhinovirus/Enterovirus Not Detected     Influenza A PCR Not Detected     Influenza B PCR Not Detected     Parainfluenza Virus 1 Not Detected     Parainfluenza Virus 2 Not Detected     Parainfluenza Virus 3 Not Detected     Parainfluenza Virus 4 Not Detected     RSV, PCR Not Detected     Bordetella pertussis pcr Not Detected     Bordetella parapertussis PCR Not Detected     Chlamydophila pneumoniae PCR Not Detected     Mycoplasma pneumo by PCR Not Detected    Narrative:      In the setting of a positive respiratory panel with a viral infection PLUS a negative procalcitonin without other underlying concern for bacterial infection, consider observing off antibiotics or discontinuation of antibiotics and continue supportive care. If the respiratory panel is positive for atypical bacterial infection (Bordetella pertussis, Chlamydophila pneumoniae, or Mycoplasma pneumoniae), consider antibiotic de-escalation to target atypical bacterial infection.                Radiology:  Imaging Results (Last 72 Hours)     Procedure Component Value Units Date/Time    MRI Brain With & Without Contrast [[id number]] Collected: 04/07/23 1627     Updated: 04/07/23 1636    Narrative:        MRI BRAIN W WO CONTRAST    Date of Exam: 4/7/2023 3:30 PM  EDT    Indication: new onset sz protocol.     Comparison: CT earlier the same day    Technique:  Routine multiplanar/multisequence sequence images of the brain were obtained before and after the uneventful administration of  20 mL Multihance.    Findings:   Corresponding to the area of apparent low density within the left basal ganglia seen on same-day CT, there is a somewhat irregular, partially cystic and avidly enhancing mass measuring 2.2 x 2.0 cm. There is either a separate nodule or small satellite   nodule present posteriorly measuring 8 mm, also enhancing. Moderate surrounding edema is present on FLAIR sequences, extending to involve the left medial temporal lobe. No additional mass or abnormal enhancement is present. The ventricles are normal in   size and configuration. No acute infarct is present on diffusion weighted sequences. The orbits are normal. The paranasal sinuses are grossly clear.      Impression:      Impression:   2.2 cm enhancing and partially cystic mass of the left basal ganglia as above, with surrounding edema extending towards the insular and left temporal regions, favoring primary glial neoplasm over metastasis. Recommend neurosurgical consultation.    Electronically Signed: Christiano Nino    4/7/2023 4:32 PM EDT    Workstation ID: [id number]    CT Head Without Contrast [[id number]] Collected: 04/07/23 1213     Updated: 04/07/23 1218    Narrative:      CT HEAD WO CONTRAST    Date of Exam: 4/7/2023 11:50 AM EDT    Indication: new onset sz.    Comparison: None available.    Technique: Axial CT images were obtained of the head without contrast administration.  Reconstructed coronal and sagittal images were also obtained. Automated exposure control and iterative construction methods were used.    Findings:  Faint hypodensity is seen within the left basal ganglia, likely either chronic lacunar infarct or prominent perivascular space. There is otherwise no evidence of intracranial hemorrhage,  mass or mass effect. The ventricles are normal in size and   configuration. The orbits are normal. The paranasal sinuses are grossly clear. The calvarium is intact.      Impression:      Impression:  Faint hypodensity is seen within the left basal ganglia, likely either chronic lacunar infarct or prominent perivascular space.     No acute intracranial abnormality otherwise.    Electronically Signed: Christiano Nino    4/7/2023 12:15 PM EDT    Workstation ID: [id number]    XR Chest 1 View [[id number]] Collected: 04/07/23 1131     Updated: 04/07/23 1134    Narrative:      XR CHEST 1 VW    Date of Exam: 4/7/2023 10:53 AM EDT    Indication: Weak/Dizzy/AMS triage protocol.    Comparison: None available.    FINDINGS: No focal airspace opacity. No pleural effusion or pneumothorax. Normal heart and mediastinal contours.      Impression:      No evidence of acute disease in the chest.     Electronically Signed: Christiano Nino    4/7/2023 11:31 AM EDT    Workstation ID: [id number]            Impression:   1. 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  Since he is an immunocompromised transplant patient, he will be at risk for opportunistic infection.  I doubt that we will be able to make a diagnosis based on his CSF.  He will almost certainly require a biopsy for diagnosis.  Potential infectious etiologies include a aerobic/anaerobic bacterial infection, cryptococcoma, other fungal infections such as histoplasmosis/blastomycosis, tuberculoma, toxoplasmosis, nocardia, and actinomyces.  NATALI virus induced PML generally has multifocal lesions but this would be another consideration.  Brain tissue should be sent for histology, aerobic/anaerobic/mycobacterial/fungal cultures and I would like to save tissue to send for 16s ribosomal RNA next generation sequencing at the [facility] if the pathology does not demonstrate tumor and is more suggestive of infection.   2. New onset of seizure.  On  Keppra  3. Leukocytosis/neutrophilia, stress vs infection  4. Lactic acidosis, stress vs other  5. H/o Liver cancer s/p liver transplant 2013 on Tacrolimus.  6. Immunocompromised host.    PLAN/RECOMMENDATIONS:   Thank you for asking us to see Cheo Garrett, I recommend the followin. Follow blood cultures  2. Toxoplasma PCR on CSF  3. Add Cryptococcus Ag for CSF.  D/w micro.  4. Check serum Cryptococcus Ag, Quantiferon gold TB, and Toxoplasma serologies.  5. Histoplasma urinary antigen and Blastomyces urinary antigen  6. Awaiting biopsy of brain lesion on , please save some tissue for 16s ribosomal RNA next generation sequencing studies.    7. Follow off antibiotics for now.    Duncan Pradhan MD saw and examined patient, verified hx and PE, read all radiographic studies, reviewed labs and micro data, and formulated dx, plan for treatment and all medical decision making.      Shiraz May PA-C for Duncan Pradhan MD    I reviewed the CT scan and MRI images with the patient and his wife.  I discussed his extremely complex situation in detail with both him and his wife.  I also discussed his situation with Dr. Piero Gibbons.    Duncan Pradhan MD  2023  16:49 EDT

## 2023-04-08 NOTE — PLAN OF CARE
Goal Outcome Evaluation:  Plan of Care Reviewed With: patient, spouse, family            SLP evaluation completed. Will address cognition & sign off dysphagia. Please see note for further details and recommendations.

## 2023-04-08 NOTE — THERAPY EVALUATION
Acute Care - Speech Language Pathology Initial Evaluation  [facility]   Cognitive-Communication Evaluation  Clinical Swallow Evaluation     Patient Name: Cheo Garrett  : 1975  MRN: [id number]  Today's Date: 2023               Admit Date: 2023     Visit Dx:    ICD-10-CM ICD-9-CM   1. New onset seizure  R56.9 780.39   2. Confusion  R41.0 298.9   3. Leukocytosis, unspecified type  D72.829 288.60   4. Hypokalemia  E87.6 276.8   5. Impaired functional mobility, balance, gait, and endurance  Z74.09 V49.89   6. Brain mass  G93.89 348.89     Patient Active Problem List   Diagnosis   • Seizures   • Liver transplanted   • Immunosuppressed status   • Brain mass     Past Medical History:   Diagnosis Date   • Liver cancer      History reviewed. No pertinent surgical history.    SLP Recommendation and Plan  SLP Diagnosis: mild, cognitive-linguistic disorder (23 132)           Swallow Criteria for Skilled Therapeutic Interventions Met: no problems identified which require skilled intervention (23 132)  SLC Criteria for Skilled Therapy Interventions Met: yes (23 132)  Anticipated Discharge Disposition (SLP): home with OP services, anticipate therapy at next level of care (23 132)     Therapy Frequency (Swallow): evaluation only (23 132)  Predicted Duration Therapy Intervention (Days): until discharge (23 132)                          Plan of Care Reviewed With: patient, spouse, family (23 1402)      SLP EVALUATION (last 72 hours)     SLP SLC Evaluation     Row Name 23                   Communication Assessment/Intervention    Document Type evaluation  -MP        Subjective Information no complaints  -MP        Patient Observations alert;cooperative  -MP        Patient/Family/Caregiver Comments/Observations Family & s/o present  -MP        Patient Effort good  -MP           General Information    Patient Profile Reviewed yes  -MP        Pertinent History Of  Current Problem Adm 4/7 w/ seizures, confusion since Monday. 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 Differential currently includes infectious process v. primary mass v. metastases. Plans for biopsy Tuesday. Hx liver ca s/p liver transplant 2013 on chronic immunosuppressant. Consult received dysphagia eval, but spouse additionally requested cognitive-communication eval.  -MP        Precautions/Limitations, Vision WFL  -MP        Precautions/Limitations, Hearing WFL  -MP        Prior Level of Function-Communication WFL  -MP        Plans/Goals Discussed with patient;spouse/S.O.;family;agreed upon  -MP        Barriers to Rehab medically complex  -MP        Patient's Goals for Discharge patient did not state  -MP        Family Goals for Discharge family did not state  -MP           Pain    Additional Documentation Pain Scale: FACES Pre/Post-Treatment (Group)  -MP           Pain Scale: FACES Pre/Post-Treatment    Pain: FACES Scale, Pretreatment 0-->no hurt  -MP        Posttreatment Pain Rating 0-->no hurt  -MP           Comprehension Assessment/Intervention    Comprehension Assessment/Intervention Auditory Comprehension  -MP           Auditory Comprehension Assessment/Intervention    Auditory Comprehension (Communication) WFL  -MP           Expression Assessment/Intervention    Expression Assessment/Intervention verbal expression  -MP           Verbal Expression Assessment/Intervention    Verbal Expression WFL  -MP           Motor Speech Assessment/Intervention    Motor Speech Function WFL  -MP           Cognitive Assessment Intervention- SLP    Cognitive Function (Cognition) mild impairment  -MP        Orientation Status (Cognition) awareness of basic personal information;person;place;time;situation;WFL  -MP        Memory (Cognitive) short-term;long-term;WFL  -MP        Attention (Cognitive)  sustained;WFL  -MP        Thought Organization (Cognitive) abstract convergent;drawing conclusions;mental manipulation;mild impairment  -MP        Reasoning (Cognitive) deductive;mild impairment  -MP        Problem Solving (Cognitive) simple;WFL  -MP        Functional Math (Cognitive) simple;word problems;WFL  -MP           SLP Evaluation Clinical Impressions    SLP Diagnosis mild;cognitive-linguistic disorder  -MP        Rehab Potential/Prognosis good  -MP        SLC Criteria for Skilled Therapy Interventions Met yes  -MP        Functional Impact functional impact in ADLs;functional impact in social situations;restrictions in personal and social life  -MP           Recommendations    Therapy Frequency (SLP [facility]) 3 days per week  -MP        Predicted Duration Therapy Intervention (Days) until discharge  -MP        Anticipated Discharge Disposition (SLP) home with OP services;anticipate therapy at next level of care  -MP              User Key  (r) = Recorded By, (t) = Taken By, (c) = Cosigned By    Initials Name Effective Dates    MP Brent Spencer MS CCC-SLP 12/28/21 -                    EDUCATION  The patient has been educated in the following areas:     Cognitive Impairment Communication Impairment.           SLP GOALS     Row Name 04/08/23 1320             Patient will demonstrate functional cognitive-linguistic skills for return to discharge environment    [place] with minimal cues  -MP      Time frame by discharge  -MP         Organizational Skills Goal 1 (SLP)    Improve Thought Organization Through Goal 1 (SLP) completing a convergent naming task;completing a divergent naming task;abstract;naming similarities and differences;drawing conclusions;completing mental manipulation task;80%;with minimal cues (75-90%)  -MP      Time Frame (Thought Organization Skills Goal 1, SLP) short term goal (STG)  -MP         Reasoning Goal 1 (SLP)    Improve Reasoning Through Goal 1 (SLP) complete deductive  reasoning task;complete high level reasoning task;complete mental flexibility task;80%;with minimal cues (75-90%)  -MP      Time Frame (Reasoning Goal 1, SLP) short term goal (STG)  -MP            User Key  (r) = Recorded By, (t) = Taken By, (c) = Cosigned By    Initials Name Provider Type    Brent Szymanski MS CCC-SLP Speech and Language Pathologist                        Time Calculation:      Time Calculation- SLP     Row Name 23 1402             Time Calculation- SLP    SLP Start Time 1320  -MP      SLP Received On 23  -MP         Untimed Charges    23067-VS Eval Speech and Production w/ Language Minutes 25  -MP      38835-UX Eval Oral Pharyng Swallow Minutes 12  -MP         Total Minutes    Untimed Charges Total Minutes 37  -MP       Total Minutes 37  -MP            User Key  (r) = Recorded By, (t) = Taken By, (c) = Cosigned By    Initials Name Provider Type    Brent Szymanski MS CCC-SLP Speech and Language Pathologist                Therapy Charges for Today     Code Description Service Date Service Provider Modifiers Qty    96915375934 HC ST EVAL SPEECH AND PROD W LANG  2 2023 Brent Spencer MS CCC-SLP GN 1    90707151941 HC ST EVAL ORAL PHARYNG SWALLOW 1 2023 Brent Spencer MS CCC-SLP GN 1                     Brent Teague MS CCC-SLP  2023 and Acute Care - Speech Language Pathology   Swallow Initial Evaluation [facility]     Patient Name: Cheo Garrett  : 1975  MRN: [id number]  Today's Date: 2023               Admit Date: 2023    Visit Dx:     ICD-10-CM ICD-9-CM   1. New onset seizure  R56.9 780.39   2. Confusion  R41.0 298.9   3. Leukocytosis, unspecified type  D72.829 288.60   4. Hypokalemia  E87.6 276.8   5. Impaired functional mobility, balance, gait, and endurance  Z74.09 V49.89   6. Brain mass  G93.89 348.89     Patient Active Problem List   Diagnosis   • Seizures   • Liver transplanted   • Immunosuppressed status   •  Brain mass     Past Medical History:   Diagnosis Date   • Liver cancer      History reviewed. No pertinent surgical history.    SLP Recommendation and Plan  SLP Swallowing Diagnosis: swallow WFL/no suspected pharyngeal impairment (04/08/23 1320)  SLP Diet Recommendation: regular textures, thin liquids (04/08/23 1320)  Recommended Precautions and Strategies: upright posture during/after eating, general aspiration precautions (04/08/23 1320)  SLP Rec. for Method of Medication Administration: as tolerated (04/08/23 1320)     Monitor for Signs of Aspiration: notify SLP if any concerns (04/08/23 1320)     Swallow Criteria for Skilled Therapeutic Interventions Met: no problems identified which require skilled intervention (04/08/23 1320)  Anticipated Discharge Disposition (SLP): home with OP services, anticipate therapy at next level of care (04/08/23 1320)     Therapy Frequency (Swallow): evaluation only (04/08/23 1320)  Predicted Duration Therapy Intervention (Days): until discharge (04/08/23 1320)                                        Plan of Care Reviewed With: patient, spouse, family      SWALLOW EVALUATION (last 72 hours)     SLP Adult Swallow Evaluation     Row Name 04/08/23 1320                   General Information    Current Method of Nutrition regular textures;thin liquids  -MP        Prior Level of Function-Communication WFL  -MP        Prior Level of Function-Swallowing no diet consistency restrictions  -MP        Patient's Goals for Discharge patient did not state  -MP        Family Goals for Discharge family did not state  -MP           Oral Motor Structure and Function    Dentition Assessment natural, present and adequate  -MP        Secretion Management WNL/WFL  -MP        Mucosal Quality moist, healthy  -MP           Oral Musculature and Cranial Nerve Assessment    Oral Motor General Assessment WFL  -MP           General Eating/Swallowing Observations    Respiratory Support Currently in Use room air   -MP        Eating/Swallowing Skills self-fed  -MP        Positioning During Eating upright in bed  -MP        Utensils Used spoon;cup;straw  -MP        Consistencies Trialed thin liquids;regular textures  -MP           Clinical Swallow Eval    Pharyngeal Phase no overt signs/symptoms of pharyngeal impairment  -MP        Clinical Swallow Evaluation Summary Observed w/ lunch. No clinical s/sxs aspiration/distress w/ any consistency.  -MP           SLP Evaluation Clinical Impression    SLP Swallowing Diagnosis swallow WFL/no suspected pharyngeal impairment  -MP        Swallow Criteria for Skilled Therapeutic Interventions Met no problems identified which require skilled intervention  -MP           Recommendations    Therapy Frequency (Swallow) evaluation only  -MP        SLP Diet Recommendation regular textures;thin liquids  -MP        Recommended Precautions and Strategies upright posture during/after eating;general aspiration precautions  -MP        Oral Care Recommendations Oral Care BID/PRN  -MP        SLP Rec. for Method of Medication Administration as tolerated  -MP        Monitor for Signs of Aspiration notify SLP if any concerns  -MP              User Key  (r) = Recorded By, (t) = Taken By, (c) = Cosigned By    Initials Name Effective Dates     Brent Spencer, MS CCC-SLP 12/28/21 -                 EDUCATION  The patient has been educated in the following areas:   Dysphagia (Swallowing Impairment).        SLP GOALS     Row Name 04/08/23 1320             Patient will demonstrate functional cognitive-linguistic skills for return to discharge environment    [place] with minimal cues  -MP      Time frame by discharge  -MP         Organizational Skills Goal 1 (SLP)    Improve Thought Organization Through Goal 1 (SLP) completing a convergent naming task;completing a divergent naming task;abstract;naming similarities and differences;drawing conclusions;completing mental manipulation task;80%;with minimal  cues (75-90%)  -MP      Time Frame (Thought Organization Skills Goal 1, SLP) short term goal (STG)  -MP         Reasoning Goal 1 (SLP)    Improve Reasoning Through Goal 1 (SLP) complete deductive reasoning task;complete high level reasoning task;complete mental flexibility task;80%;with minimal cues (75-90%)  -MP      Time Frame (Reasoning Goal 1, SLP) short term goal (STG)  -MP            User Key  (r) = Recorded By, (t) = Taken By, (c) = Cosigned By    Initials Name Provider Type    Brent Szymanski MS CCC-SLP Speech and Language Pathologist                   Time Calculation:    Time Calculation- SLP     Row Name 04/08/23 1402             Time Calculation- SLP    SLP Start Time 1320  -MP      SLP Received On 04/08/23  -MP         Untimed Charges    43248-MX Eval Speech and Production w/ Language Minutes 25  -MP      44548-IS Eval Oral Pharyng Swallow Minutes 12  -MP         Total Minutes    Untimed Charges Total Minutes 37  -MP       Total Minutes 37  -MP            User Key  (r) = Recorded By, (t) = Taken By, (c) = Cosigned By    Initials Name Provider Type    Brent Szymanski MS CCC-SLP Speech and Language Pathologist                Therapy Charges for Today     Code Description Service Date Service Provider Modifiers Qty    02060238642 HC ST EVAL SPEECH AND PROD W LANG  2 4/8/2023 Brent Spencer MS CCC-SLP GN 1    [id number] HC ST EVAL ORAL PHARYNG SWALLOW 1 4/8/2023 Brent Spencer MS CCC-SLP GN 1               MS ZULEMA Cooper  4/8/2023

## 2023-04-08 NOTE — PROGRESS NOTES
requested to support patient and his wife with the news of a brain tumor. They are here from [place] visiting family over Easter weekend when pt experienced a seizure.  CT of his head showed the brain tumor. 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  provided supportive conversation, active listening and spiritual encouragement and prayer during this visit.

## 2023-04-08 NOTE — PLAN OF CARE
Goal Outcome Evaluation:  Plan of Care Reviewed With: patient, spouse           Outcome Evaluation: Pt presents with deficits in functional mobility compared to baseline. He has deficits in balance and coordination. He would benefit from skilled PT services to improve functional independence. Anticipate home with outpt PT based on findings today; however, pt has not yet been seen by neuro to determine plan of care. Will continue to monitor discharge needs.

## 2023-04-08 NOTE — PLAN OF CARE
Goal Outcome Evaluation:  Plan of Care Reviewed With: patient, spouse        Progress: no change  Outcome Evaluation: No observed seizure activity since arrival on telemetry unit. Seizure precautions implemented per protoco.  Pt is forgetful, does self-correct when some questions initially answered incorrectly. Follows commands. RUE&RLE slightly weak, sensation intact, + slight ataxia LUE. See NIHSS/neuro eval. Pt does c/o needing his glasses to watch TV, but states no recent visual changes; no field cuts noted on exam. NSR. BP=WDL. Pt denies pain at this time, does have history of chronic low back pain for which he takes ibuprofen PRN. Also significant history of liver transplant w/ ongoing imunosuppressive Rx. MRI resulted w/ Neurosurgery consultation requested. Loading dose of Keppra & decadron given as ordered. Spouse at bedside, supportive & participating in care, will remain at bedside; she is tearful/afraid of pt's diagnosis (undefined & not resulted to pt/family at this time - MD to discuss findings w/ them). Spouse is occupational therapist w/ significant medical knowledge. Treatment & discharge plan pending final diagnosis & pt's reponse to treatment. Pt passed initial bedside dysphagia evaluation, still has consult w/ SLP ordered. He ate 100% of dinner without evident difficulty or adverse events. PT/OT are also consulted, pt w/ unsteady gait & right sided weakness.

## 2023-04-08 NOTE — THERAPY EVALUATION
Patient Name: Cheo Garrett  : 1975    MRN: [id number]                              Today's Date: 2023       Admit Date: 2023    Visit Dx:     ICD-10-CM ICD-9-CM   1. New onset seizure  R56.9 780.39   2. Confusion  R41.0 298.9   3. Leukocytosis, unspecified type  D72.829 288.60   4. Hypokalemia  E87.6 276.8   5. Impaired functional mobility, balance, gait, and endurance  Z74.09 V49.89     Patient Active Problem List   Diagnosis   • Seizures   • Liver transplanted   • Immunosuppressed status     Past Medical History:   Diagnosis Date   • Liver cancer      History reviewed. No pertinent surgical history.   General Information     Row Name 23          Physical Therapy Time and Intention    Document Type evaluation  -     Mode of Treatment physical therapy  -     Row Name 23          General Information    Patient Profile Reviewed yes  -LS     Prior Level of Function independent:;community mobility;gait  farms  -     Existing Precautions/Restrictions fall;other (see comments);seizures  confusion  -     Barriers to Rehab medically complex  -     Row Name 23          Living Environment    People in Home spouse  -     Row Name 23          Home Main Entrance    Number of Stairs, Main Entrance five  -LS     Stair Railings, Main Entrance --  with railing  -     Row Name 23          Stairs Within Home, Primary    Stairs Comment, Within Home, Primary split level flight of stairs. HR on opposite sides after the landing.  -     Row Name 23          Cognition    Orientation Status (Cognition) oriented to;person;time;disoriented to;situation  Pt knows he is in the hospital in [place]. He picked the correct hospital, given a choice of 2. He said it was a good guess.  -     Row Name 23          Safety Issues, Functional Mobility    Safety Issues Affecting Function (Mobility) safety precaution awareness;insight into  deficits/self-awareness;safety precautions follow-through/compliance  -LS     Impairments Affecting Function (Mobility) cognition;balance;coordination;motor control  -LS     Cognitive Impairments, Mobility Safety/Performance awareness, need for assistance;insight into deficits/self-awareness;judgment;safety precaution awareness;safety precaution follow-through  -LS           User Key  (r) = Recorded By, (t) = Taken By, (c) = Cosigned By    Initials Name Provider Type    Elza Burns PT Physical Therapist               Mobility     Row Name 04/08/23 0730          Bed Mobility    Bed Mobility supine-sit  -LS     Supine-Sit Sherman (Bed Mobility) independent  -LS     Comment, (Bed Mobility) Indep supine to sit from flat bed suface without bedrail. Per PT's clinical judgment, pt would be indep sit to supine as well.  -LS     Row Name 04/08/23 0730          Sit-Stand Transfer    Sit-Stand Sherman (Transfers) standby assist  -LS     Row Name 04/08/23 0730          Gait/Stairs (Locomotion)    Sherman Level (Gait) contact guard  -LS     Distance in Feet (Gait) 400  -LS     Comment, (Gait/Stairs) Pt amb with a step-through gait pattern without an assistive device. Able to take steps backwards and sidestep right and left without LOB.  -LS           User Key  (r) = Recorded By, (t) = Taken By, (c) = Cosigned By    Initials Name Provider Type    Elza Burns, PT Physical Therapist               Obj/Interventions     Row Name 04/08/23 0730          Range of Motion Comprehensive    General Range of Motion bilateral lower extremity ROM WFL  -     Row Name 04/08/23 0730          Strength Comprehensive (MMT)    Comment, General Manual Muscle Testing (MMT) Assessment B hip flexors 4/5. B knee extensors and B ankle dorsiflexors 5/5.  -LS     Row Name 04/08/23 0730          Motor Skills    Motor Skills coordination  -     Coordination lower extremity;gross motor deficit;heel to shin;other (see  comments)  Heel to shin test WFL RLE but unable to perform with good coordination LLE.  -LS     Row Name 04/08/23 0730          Balance    Balance Interventions standing;sit to stand;supported;weight shifting activity;single limb stance  -LS     Comment, Balance Pt able to stand unilaterally on LLE >5 seconds with good balance.Able to stand RLE 2-3 seconds with CGA to min A and reaching for a support surface.  -LS     Row Name 04/08/23 0730          Sensory Assessment (Somatosensory)    Sensory Assessment (Somatosensory) sensation intact;bilateral LE  -LS     Bilateral LE Sensory Assessment light touch awareness  -LS           User Key  (r) = Recorded By, (t) = Taken By, (c) = Cosigned By    Initials Name Provider Type    LS Elza Peterson, PT Physical Therapist               Goals/Plan     Row Name 04/08/23 0730          Transfer Goal 1 (PT)    Activity/Assistive Device (Transfer Goal 1, PT) sit-to-stand/stand-to-sit  -LS     Brooke Level/Cues Needed (Transfer Goal 1, PT) independent  -LS     Time Frame (Transfer Goal 1, PT) 10 days  -LS     Progress/Outcome (Transfer Goal 1, PT) goal ongoing  -     Row Name 04/08/23 0730          Gait Training Goal 1 (PT)    Activity/Assistive Device (Gait Training Goal 1, PT) gait (walking locomotion)  -LS     Brooke Level (Gait Training Goal 1, PT) independent  -LS     Distance (Gait Training Goal 1, PT) 600  -LS     Time Frame (Gait Training Goal 1, PT) 10 days  -LS     Progress/Outcome (Gait Training Goal 1, PT) goal ongoing  -     Row Name 04/08/23 0730          Stairs Goal 1 (PT)    Activity/Assistive Device (Stairs Goal 1, PT) stairs, all skills;ascending stairs  -LS     Brooke Level/Cues Needed (Stairs Goal 1, PT) modified independence;other (see comments)  with handrail  -LS     Number of Stairs (Stairs Goal 1, PT) 10  -LS     Time Frame (Stairs Goal 1, PT) 10 days  -LS     Progress/Outcome (Stairs Goal 1, PT) goal ongoing  -     Row Name  04/08/23 0730          Therapy Assessment/Plan (PT)    Planned Therapy Interventions (PT) balance training;gait training;home exercise program;patient/family education;neuromuscular re-education;motor coordination training;strengthening;transfer training;stair training  -           User Key  (r) = Recorded By, (t) = Taken By, (c) = Cosigned By    Initials Name Provider Type    Elza Burns, PT Physical Therapist               Clinical Impression     Row Name 04/08/23 0730          Pain    Pretreatment Pain Rating 0/10 - no pain  -LS     Posttreatment Pain Rating 0/10 - no pain  -LS     Row Name 04/08/23 0730          Plan of Care Review    Plan of Care Reviewed With patient;spouse  -     Outcome Evaluation Pt presents with deficits in functional mobility compared to baseline. He has deficits in balance and coordination. He would benefit from skilled PT services to improve functional independence. Anticipate home with outpt PT based on findings today; however, pt has not yet been seen by neuro to determine plan of care. Will continue to monitor discharge needs.  -     Row Name 04/08/23 0730          Therapy Assessment/Plan (PT)    Rehab Potential (PT) good, to achieve stated therapy goals  -     Criteria for Skilled Interventions Met (PT) yes;meets criteria  -     Therapy Frequency (PT) daily  -     Row Name 04/08/23 0730          Vital Signs    Pre Systolic BP Rehab --  VSS  -     Row Name 04/08/23 0730          Positioning and Restraints    Pre-Treatment Position in bed  -LS     Post Treatment Position chair  -LS     In Chair notified nsg;sitting;call light within reach;encouraged to call for assist;exit alarm on;with family/caregiver;with other staff  with MD  -           User Key  (r) = Recorded By, (t) = Taken By, (c) = Cosigned By    Initials Name Provider Type    Elza Burns, PT Physical Therapist               Outcome Measures     Row Name 04/08/23 0730          How much  help from another person do you currently need...    Turning from your back to your side while in flat bed without using bedrails? 4  -LS     Moving from lying on back to sitting on the side of a flat bed without bedrails? 4  -LS     Moving to and from a bed to a chair (including a wheelchair)? 3  -LS     Standing up from a chair using your arms (e.g., wheelchair, bedside chair)? 3  -LS     Climbing 3-5 steps with a railing? 3  -LS     To walk in hospital room? 3  -LS     AM-PAC 6 Clicks Score (PT) 20  -LS     Highest level of mobility 6 --> Walked 10 steps or more  -           User Key  (r) = Recorded By, (t) = Taken By, (c) = Cosigned By    Initials Name Provider Type    Elza Burns, PT Physical Therapist                             Physical Therapy Education     Title: PT OT SLP Therapies (In Progress)     Topic: Physical Therapy (In Progress)     Point: Mobility training (Not Started)     Learner Progress:  Not documented in this visit.          Point: Home exercise program (Not Started)     Learner Progress:  Not documented in this visit.          Point: Body mechanics (Not Started)     Learner Progress:  Not documented in this visit.          Point: Precautions (Done)     Learning Progress Summary           Patient Acceptance, E, VU by  at 4/8/2023 0730    Comment: Benefits of activity                               User Key     Initials Effective Dates Name Provider Type Discipline     02/03/23 -  Elza Peterson, PT Physical Therapist PT              PT Recommendation and Plan  Planned Therapy Interventions (PT): balance training, gait training, home exercise program, patient/family education, neuromuscular re-education, motor coordination training, strengthening, transfer training, stair training  Plan of Care Reviewed With: patient, spouse  Outcome Evaluation: Pt presents with deficits in functional mobility compared to baseline. He has deficits in balance and coordination. He would  benefit from skilled PT services to improve functional independence. Anticipate home with outpt PT based on findings today; however, pt has not yet been seen by neuro to determine plan of care. Will continue to monitor discharge needs.     Time Calculation:    PT Charges     Row Name 04/08/23 0730             Time Calculation    Start Time 0730  -LS      PT Received On 04/08/23  -LS      PT Goal Re-Cert Due Date 04/18/23  -LS         Time Calculation- PT    Total Timed Code Minutes- PT 10 minute(s)  -LS         Timed Charges    19513 - PT Therapeutic Activity Minutes 10  -LS         Untimed Charges    PT Eval/Re-eval Minutes 55  -LS         Total Minutes    Timed Charges Total Minutes 10  -LS      Untimed Charges Total Minutes 55  -LS       Total Minutes 65  -LS            User Key  (r) = Recorded By, (t) = Taken By, (c) = Cosigned By    Initials Name Provider Type    Elza Burns, PT Physical Therapist              Therapy Charges for Today     Code Description Service Date Service Provider Modifiers Qty    06353187945 HC PT EVAL MOD COMPLEXITY 4 4/8/2023 Elza Peterson, PT GP 1    66873769435 HC PT THERAPEUTIC ACT EA 15 MIN 4/8/2023 Elza Peterson, PT GP 1          PT G-Codes  AM-PAC 6 Clicks Score (PT): 20  PT Discharge Summary  Anticipated Discharge Disposition (PT): home with outpatient therapy services (will continue to monitor)    Elza Peterson, KATIE  4/8/2023

## 2023-04-08 NOTE — PROGRESS NOTES
"DOS: 2023  NAME: Cheo Garrett   : 1975  PCP: Provider, No Known  Chief Complaint   Patient presents with   • Altered Mental Status       Chief complaint: He is currently lot more clear and able to talk well.  Subjective: Denies any headaches and he is able to follow one-step two-step and three-step commands without any problems.  He was fit enough to jump out of the bed but he is slightly off balance when I was doing the Romberg testing.  He was able to ambulate the entire room without any issues.    Objective:  Vital signs: /91 (BP Location: Left arm, Patient Position: Sitting)   Pulse 91   Temp 97.6 °F (36.4 °C) (Oral)   Resp 18   Ht 193 cm (75.98\")   Wt 109 kg (239 lb 12.8 oz)   SpO2 93%   BMI 29.20 kg/m²    Gen: NAD, vitals reviewed  MS: Normal.  CN: Cranials 2-12: No focal deficits noted.  Motor: Muscles of both upper and lower extremities show good bulk power and tone.  Sensory: No sensory loss noted.  DTRs: 2+ bilaterally with downgoing toes.  Coordination: Normal finger-to-nose coordination noted.  Gait: He was able to get out of the bed and ambulate independently without problems.  The Romberg was positive and he was tending to fall to the left backwards.    ROS:  General: Pleasant gentleman currently feels better.  Neurological: Positive Romberg noted but otherwise no focal deficits noted.    Laboratory results:  Lab Results   Component Value Date    GLUCOSE 153 (H) 2023    CALCIUM 9.4 2023     2023    K 4.0 2023    CO2 23.0 2023     2023    BUN 13 2023    CREATININE 0.82 2023    BCR 15.9 2023    ANIONGAP 12.0 2023     Lab Results   Component Value Date    WBC 9.94 2023    HGB 17.2 2023    HCT 47.4 2023    MCV 85.3 2023     2023            Review of labs: The fasting glucose was elevated 153 because of the effects of dexamethasone.  The CSF studies were as follows:     Latest " Reference Range & Units Most Recent   Supernatant Color, CSF Colorless  Colorless  4/7/23 13:04   Appearance, CSF Clear  Clear  4/7/23 13:04   Color, CSF Colorless  Colorless  4/7/23 13:04   Tube Number, CSF  1  4/7/23 13:04   Volume, CSF mL 8.0  4/7/23 13:04   WBC, CSF 0 - 5 /mm3 0  4/7/23 13:04   RBC, CSF 0 - 5 /mm3 18 (H)  4/7/23 13:04   Glucose, CSF 40 - 70 mg/dL 88 (H)  4/7/23 13:04   Protein, Total (CSF) 15.0 - 45.0 mg/dL 30.7  4/7/23 13:04   (H): Data is abnormally high  ESCHERICHIA COLI K1, PCR  Not Detected Not Detected    HAEMOPHILUS INFLUENZAE, PCR  Not Detected Not Detected    LISTERIA MONOCYTOGENES, PCR  Not Detected Not Detected    NEISSERIA MENINGITIDIS, PCR  Not Detected Not Detected    STREPTOCOCCUS AGALACTIAE, PCR  Not Detected Not Detected    STREPTOCOCCUS PNEUMONIAE, PCR  Not Detected Not Detected    CYTOMEGALOVIRUS (CMV), PCR  Not Detected Not Detected    ENTEROVIRUS, PCR  Not Detected Not Detected    HERPES SIMPLEX VIRUS 1 (HSV-1), PCR  Not Detected Not Detected    HERPES SIMPLEX VIRUS 2 (HSV-2), PCR  Not Detected Not Detected    HUMAN PARECHOVIRUS, PCR  Not Detected Not Detected    VARICELLA ZOSTER VIRUS (VZV), PCR  Not Detected Not Detected    CRYPTOCOCCUS NEOFORMANS / GATTII, PCR  Not Detected Not Detected    HUMAN HERPES VIRUS 6 PCR  Not Detected Not Detected        Review and interpretation of imaging: The MRI of the brain with and without contrast performed on April 7, 2023 showed the following:    Findings:   Corresponding to the area of apparent low density within the left basal ganglia seen on same-day CT, there is a somewhat irregular, partially cystic and avidly enhancing mass measuring 2.2 x 2.0 cm. There is either a separate nodule or small satellite   nodule present posteriorly measuring 8 mm, also enhancing. Moderate surrounding edema is present on FLAIR sequences, extending to involve the left medial temporal lobe. No additional mass or abnormal enhancement is present. The  ventricles are normal in   size and configuration. No acute infarct is present on diffusion weighted sequences. The orbits are normal. The paranasal sinuses are grossly clear.     IMPRESSION:  Impression:   2.2 cm enhancing and partially cystic mass of the left basal ganglia as above, with surrounding edema extending towards the insular and left temporal regions, favoring primary glial neoplasm over metastasis. Recommend neurosurgical consultation    Workup to date: The EEG performed showed the following:    Findings:     The patient is awake.  The background shows diffuse medium amplitude 4-6 Hz intermixed delta and theta activity which is present symmetrically over both hemispheres.  A clear posterior rhythm is not seen.  EMG artifact is variably prominent anteriorly.  Photic stimulation does not change the background.  Hyperventilation is not performed.  Sleep is seen with slowing of the tracing and vertex waves and sleep spindles.  No focal features are seen.  No epileptiform activity is present.  No ongoing seizures are seen.     Video: Available     Technical quality: Superior     EKG: Regular, 70 beats per     SUMMARY:     Mild-moderate generalized slow     No focal features or epileptiform activity are seen     IMPRESSION:     Diffuse cerebral dysfunction of mild degree but nonspecific     No ongoing seizures are seen    Diagnoses: Patient with most probably a malignant lesion in the left basal ganglia protruding into the left temporoparietal area of the brain which could have been the seizure focus.      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    Plan:  1.  CT scan of the chest abdomen pelvis is currently pending to look for any underlying malignancy or a nidus of infection.  2.  Infectious disease has been consulted for the time being.  3.  Patient has been set up for a biopsy for coming Tuesday.  4.  Continue the Keppra at 1000 mg twice daily  with food.  5.  He has been instructed not to drive or use any hazardous equipment for 6 months as per the [facility].    Discussed with with the patient and his wife and will follow-up.    Spent a total of 30 minutes in face-to-face evaluation and management of the patient.    Electronically signed by Alyssa Galloway MD, 04/08/23, 5:43 PM EDT.

## 2023-04-08 NOTE — CONSULTS
[facility]  Inpatient Neurosurgery Consult  Consult performed by: Emiliano Hightower PA-C  Consult ordered by: Roxanne Iverson DO        Name: Cheo Garrett  YOB: 1975  MRN: [id number]    Referring Provider: No ref. provider found     Patient Care Team:  Provider, No Known as PCP - General    Chief Complaint: Seizure, contusion    Subjective     History of present illness: Patient is a 47-year-old male with past medical history of liver cancer for which she underwent a transplant at [facility] in 2013, he is on chronic immuno suppression (Tacrolimus 0.5 mg).  For the last week he has had some confusion and increased somnolence.  Wife notes that earlier this week she was having a difficult time arousing him from bed, he would also have delayed and inappropriate responses beginning on Monday.  This has progressed throughout the week, and then yesterday he suffered a generalized seizure whilst in bed and was shaking and foaming at the mouth.  When EMS arrived at the scene the patient's seizure had resolved, but he was very confused and not really oriented to place and time.  He had had no prior seizures to this event.  Currently patient has had a resolution of this confusion, wife notes that his responses are still a little lagged but he is appropriate.  He denies headache, nausea, vomiting, unilateral weakness, visual changes.    Review of Systems   Constitutional: Negative for diaphoresis and fever.   Eyes: Negative for photophobia and visual disturbance.   Respiratory: Negative for shortness of breath.    Cardiovascular: Negative for palpitations.   Gastrointestinal: Negative for abdominal pain, nausea and vomiting.   Musculoskeletal: Negative for back pain and neck pain.   Allergic/Immunologic: Positive for immunocompromised state.   Neurological: Positive for seizures and speech difficulty. Negative for dizziness, tremors, syncope, facial  asymmetry, weakness, light-headedness, numbness and headaches.   Psychiatric/Behavioral: Positive for confusion.       History  Past Medical History:   Diagnosis Date   • Liver cancer        Objective     Vital Signs:  Temp:  [97.7 °F (36.5 °C)-98.4 °F (36.9 °C)] 97.7 °F (36.5 °C)  Heart Rate:  [67-97] 83  Resp:  [18-20] 18  BP: (111-142)/(72-92) 134/91  Body mass index is 29.2 kg/m².    Physical Exam:  Physical Exam  Constitutional:       Appearance: Normal appearance.   HENT:      Head: Normocephalic and atraumatic.   Eyes:      General: No visual field deficit or scleral icterus.        Right eye: No discharge.         Left eye: No discharge.      Extraocular Movements: Extraocular movements intact.      Pupils: Pupils are equal, round, and reactive to light.   Musculoskeletal:      Cervical back: Normal range of motion.      Right lower leg: No edema.      Left lower leg: No edema.      Comments: 5 out of 5 strength in intrinsic hand muscles bilaterally, 5 out of 5 strength with hip flexion bilaterally.   Neurological:      General: No focal deficit present.      Mental Status: He is alert and oriented to person, place, and time.      GCS: GCS eye subscore is 4. GCS verbal subscore is 5. GCS motor subscore is 6.      Cranial Nerves: Cranial nerves 2-12 are intact. No cranial nerve deficit, dysarthria or facial asymmetry.      Sensory: Sensation is intact. No sensory deficit.      Motor: Motor function is intact. No weakness, tremor or pronator drift.      Coordination: Coordination is intact. Romberg sign negative. Finger-Nose-Finger Test normal.   Psychiatric:         Mood and Affect: Mood normal.         Behavior: Behavior normal.         Thought Content: Thought content normal.         Judgment: Judgment normal.         Data Review:  EEG    Result Date: 4/7/2023    Diffuse cerebral dysfunction of mild degree but nonspecific No ongoing seizures are seen This report is transcribed using the Dragon dictation  system.      CT Head Without Contrast    Result Date: 4/7/2023    Impression: Faint hypodensity is seen within the left basal ganglia, likely either chronic lacunar infarct or prominent perivascular space. No acute intracranial abnormality otherwise. Electronically Signed: Christiano Nino  4/7/2023 12:15 PM EDT  Workstation ID: [id number]    MRI Brain With & Without Contrast    Result Date: 4/7/2023    Impression: 2.2 cm enhancing and partially cystic mass of the left basal ganglia as above, with surrounding edema extending towards the insular and left temporal regions, favoring primary glial neoplasm over metastasis. Recommend neurosurgical consultation. Electronically Signed: Christiano Nino  4/7/2023 4:32 PM EDT  Workstation ID: [id number]    History reviewed. No pertinent surgical history.    Assessment & Plan   Diagnosis:  (R56.9) New onset seizure    (R41.0) Confusion    (D72.829) Leukocytosis, unspecified type    (E87.6) Hypokalemia    (Z74.09) Impaired functional mobility, balance, gait, and endurance    Medical Decision Making:  Patient's brain mass' etiology is unclear at this point, the positioning does favor a primary brain tumor but with his history of liver cancer and immunosuppressed status metastases or possible infection has to be in the differential.  As such we recommend CT with and without contrast of chest, abdomen, pelvis to look for primary tumor site.  Additionally recommend infectious disease consult for an immunocompromised evaluation.    Patient can remain on his Keppra, but where there is minimal perilesional edema we can hold steroids at this time.  Tentative plan for this patient is for biopsy on Tuesday, as such I have made him n.p.o. on Tuesday at 0001.    Patient can remain on the floor until noted otherwise    Emiliano Hightower PA-C  04/08/23  10:25 EDT

## 2023-04-09 LAB
ANION GAP SERPL CALCULATED.3IONS-SCNC: 11 MMOL/L (ref 5–15)
BASOPHILS # BLD AUTO: 0.06 10*3/MM3 (ref 0–0.2)
BASOPHILS NFR BLD AUTO: 0.6 % (ref 0–1.5)
BUN SERPL-MCNC: 16 MG/DL (ref 6–20)
BUN/CREAT SERPL: 20 (ref 7–25)
CALCIUM SPEC-SCNC: 9.3 MG/DL (ref 8.6–10.5)
CHLORIDE SERPL-SCNC: 107 MMOL/L (ref 98–107)
CO2 SERPL-SCNC: 26 MMOL/L (ref 22–29)
CREAT SERPL-MCNC: 0.8 MG/DL (ref 0.76–1.27)
CRYPTOC AG CSF QL LA: NEGATIVE
CRYPTOC AG CSF QL: NEGATIVE
DEPRECATED RDW RBC AUTO: 37.5 FL (ref 37–54)
EGFRCR SERPLBLD CKD-EPI 2021: 111.2 ML/MIN/1.73
EOSINOPHIL # BLD AUTO: 0.05 10*3/MM3 (ref 0–0.4)
EOSINOPHIL NFR BLD AUTO: 0.5 % (ref 0.3–6.2)
ERYTHROCYTE [DISTWIDTH] IN BLOOD BY AUTOMATED COUNT: 12.1 % (ref 12.3–15.4)
GLUCOSE SERPL-MCNC: 97 MG/DL (ref 65–99)
HCT VFR BLD AUTO: 43.9 % (ref 37.5–51)
HGB BLD-MCNC: 15.3 G/DL (ref 13–17.7)
IMM GRANULOCYTES # BLD AUTO: 0.09 10*3/MM3 (ref 0–0.05)
IMM GRANULOCYTES NFR BLD AUTO: 0.9 % (ref 0–0.5)
LYMPHOCYTES # BLD AUTO: 2.39 10*3/MM3 (ref 0.7–3.1)
LYMPHOCYTES NFR BLD AUTO: 24.7 % (ref 19.6–45.3)
MCH RBC QN AUTO: 30.2 PG (ref 26.6–33)
MCHC RBC AUTO-ENTMCNC: 34.9 G/DL (ref 31.5–35.7)
MCV RBC AUTO: 86.6 FL (ref 79–97)
MONOCYTES # BLD AUTO: 1.15 10*3/MM3 (ref 0.1–0.9)
MONOCYTES NFR BLD AUTO: 11.9 % (ref 5–12)
NEUTROPHILS NFR BLD AUTO: 5.92 10*3/MM3 (ref 1.7–7)
NEUTROPHILS NFR BLD AUTO: 61.4 % (ref 42.7–76)
NRBC BLD AUTO-RTO: 0 /100 WBC (ref 0–0.2)
PLATELET # BLD AUTO: 145 10*3/MM3 (ref 140–450)
PMV BLD AUTO: 12.3 FL (ref 6–12)
POTASSIUM SERPL-SCNC: 3.7 MMOL/L (ref 3.5–5.2)
RBC # BLD AUTO: 5.07 10*6/MM3 (ref 4.14–5.8)
SODIUM SERPL-SCNC: 144 MMOL/L (ref 136–145)
WBC NRBC COR # BLD: 9.66 10*3/MM3 (ref 3.4–10.8)

## 2023-04-09 PROCEDURE — 99232 SBSQ HOSP IP/OBS MODERATE 35: CPT | Performed by: NEUROLOGICAL SURGERY

## 2023-04-09 PROCEDURE — 86777 TOXOPLASMA ANTIBODY: CPT | Performed by: PHYSICIAN ASSISTANT

## 2023-04-09 PROCEDURE — 63710000001 TACROLIMUS PER 1 MG: Performed by: INTERNAL MEDICINE

## 2023-04-09 PROCEDURE — 85025 COMPLETE CBC W/AUTO DIFF WBC: CPT | Performed by: INTERNAL MEDICINE

## 2023-04-09 PROCEDURE — 99232 SBSQ HOSP IP/OBS MODERATE 35: CPT | Performed by: INTERNAL MEDICINE

## 2023-04-09 PROCEDURE — 99232 SBSQ HOSP IP/OBS MODERATE 35: CPT | Performed by: PSYCHIATRY & NEUROLOGY

## 2023-04-09 PROCEDURE — 86778 TOXOPLASMA ANTIBODY IGM: CPT | Performed by: PHYSICIAN ASSISTANT

## 2023-04-09 PROCEDURE — 97165 OT EVAL LOW COMPLEX 30 MIN: CPT

## 2023-04-09 PROCEDURE — 80048 BASIC METABOLIC PNL TOTAL CA: CPT | Performed by: INTERNAL MEDICINE

## 2023-04-09 RX ADMIN — TACROLIMUS 1.5 MG: 0.5 CAPSULE ORAL at 10:06

## 2023-04-09 RX ADMIN — TACROLIMUS 1.5 MG: 0.5 CAPSULE ORAL at 21:04

## 2023-04-09 RX ADMIN — LEVETIRACETAM 1000 MG: 500 TABLET, FILM COATED ORAL at 08:50

## 2023-04-09 RX ADMIN — Medication 10 ML: at 08:50

## 2023-04-09 RX ADMIN — LEVETIRACETAM 1000 MG: 500 TABLET, FILM COATED ORAL at 21:04

## 2023-04-09 NOTE — PROGRESS NOTES
[facility] Medicine Services  PROGRESS NOTE    Patient Name: Cheo Garrett  : 1975  MRN: [id number]    Date of Admission: 2023  Primary Care Physician: Provider, No Known    Subjective   Subjective     CC:  F/u seizures    HPI:  Patient up walking around his room, his wife mentions that he had some auditory hallucinations overnight, otherwise he is doing well.    ROS:  Gen- No fevers, chills  CV- No chest pain, palpitations  Resp- No cough, dyspnea  GI- No N/V/D, abd pain    Objective   Objective     Vital Signs:   Temp:  [97.6 °F (36.4 °C)-98.3 °F (36.8 °C)] 98.2 °F (36.8 °C)  Heart Rate:  [79-97] 79  Resp:  [16-18] 18  BP: (125-143)/(75-95) 127/84     Physical Exam:  Constitutional: No acute distress, awake, alert, up walking around room  HENT: NCAT, mucous membranes moist  Respiratory:  respiratory effort normal   Cardiovascular: RRR, no murmurs, rubs, or gallops  Musculoskeletal: No bilateral ankle edema  Psychiatric: Appropriate affect, cooperative  Neurologic: Oriented x3, speech clear, no focal deficits currently  Skin: No rashes      Results Reviewed:  LAB RESULTS:      Lab 23  0640 23  0538 23  1826 23  1243 23  1053   WBC 9.66 9.94  --   --  18.22*   HEMOGLOBIN 15.3 17.2  --   --  16.6   HEMATOCRIT 43.9 47.4  --   --  46.4   PLATELETS 145 168  --   --  161   NEUTROS ABS 5.92 8.20*  --   --  15.73*   IMMATURE GRANS (ABS) 0.09* 0.04  --   --  0.10*   LYMPHS ABS 2.39 1.30  --   --  0.55*   MONOS ABS 1.15* 0.38  --   --  1.79*   EOS ABS 0.05 0.00  --   --  0.01   MCV 86.6 85.3  --   --  86.7   PROCALCITONIN  --   --   --   --  0.03   LACTATE  --   --  1.7 4.0* 6.4*   PROTIME  --   --   --   --  14.0         Lab 23  0640 23  0538 23  1053   SODIUM 144 140 140   POTASSIUM 3.7 4.0 3.1*   CHLORIDE 107 105 104   CO2 26.0 23.0 19.0*   ANION GAP 11.0 12.0 17.0*   BUN 16 13 12   CREATININE 0.80 0.82 1.08   EGFR 111.2 110.4 86.2    GLUCOSE 97 153* 151*   CALCIUM 9.3 9.4 8.8   MAGNESIUM  --   --  1.9   TSH  --   --  1.350         Lab 04/07/23  1053   TOTAL PROTEIN 6.5   ALBUMIN 4.2   GLOBULIN 2.3   ALT (SGPT) 27   AST (SGOT) 24   BILIRUBIN 0.5   ALK PHOS 48         Lab 04/07/23  1053   HSTROP T 9   PROTIME 14.0   INR 1.08                 Brief Urine Lab Results  (Last result in the past 365 days)      Color   Clarity   Blood   Leuk Est   Nitrite   Protein   CREAT   Urine HCG        04/07/23 1227 Yellow   Cloudy   Moderate (2+)   Negative   Negative   30 mg/dL (1+)                 Microbiology Results Abnormal     Procedure Component Value - Date/Time    Cryptococcal AG, CSF - Cerebrospinal Fluid, Lumbar Puncture [[id number]]  (Normal) Collected: 04/07/23 1304    Lab Status: Final result Specimen: Cerebrospinal Fluid from Lumbar Puncture Updated: 04/09/23 0734     Cryptococcal Antigen, CSF Negative    Culture, CSF - Cerebrospinal Fluid, Lumbar Puncture [[id number]] Collected: 04/07/23 1304    Lab Status: Preliminary result Specimen: Cerebrospinal Fluid from Lumbar Puncture Updated: 04/09/23 0718     CSF Culture No growth at 2 days     Gram Stain No WBCs or organisms seen    Blood Culture - Blood, Hand, Right [[id number]]  (Normal) Collected: 04/07/23 1243    Lab Status: Preliminary result Specimen: Blood from Hand, Right Updated: 04/08/23 1301     Blood Culture No growth at 24 hours    Blood Culture - Blood, Hand, Left [[id number]]  (Normal) Collected: 04/07/23 1243    Lab Status: Preliminary result Specimen: Blood from Hand, Left Updated: 04/08/23 1301     Blood Culture No growth at 24 hours    Meningitis / Encephalitis Panel, PCR - Cerebrospinal Fluid, Lumbar Puncture [[id number]]  (Normal) Collected: 04/07/23 1418    Lab Status: Final result Specimen: Cerebrospinal Fluid from Lumbar Puncture Updated: 04/07/23 1553     ESCHERICHIA COLI K1, PCR Not Detected     HAEMOPHILUS INFLUENZAE, PCR Not Detected     LISTERIA MONOCYTOGENES, PCR Not Detected      NEISSERIA MENINGITIDIS, PCR Not Detected     STREPTOCOCCUS AGALACTIAE, PCR Not Detected     STREPTOCOCCUS PNEUMONIAE, PCR Not Detected     CYTOMEGALOVIRUS (CMV), PCR Not Detected     ENTEROVIRUS, PCR Not Detected     HERPES SIMPLEX VIRUS 1 (HSV-1), PCR Not Detected     HERPES SIMPLEX VIRUS 2 (HSV-2), PCR Not Detected     HUMAN PARECHOVIRUS, PCR Not Detected     VARICELLA ZOSTER VIRUS (VZV), PCR Not Detected     CRYPTOCOCCUS NEOFORMANS / GATTII, PCR Not Detected     HUMAN HERPES VIRUS 6 PCR Not Detected    Respiratory Panel PCR w/COVID-19(SARS-CoV-2) JESSY/TAMMY/ULISES/PAD/COR/MAD/WALESKA In-House, NP Swab in UTM/VTM, 3-4 HR TAT - Swab, Nasopharynx [[id number]]  (Normal) Collected: 04/07/23 1227    Lab Status: Final result Specimen: Swab from Nasopharynx Updated: 04/07/23 1349     ADENOVIRUS, PCR Not Detected     Coronavirus 229E Not Detected     Coronavirus HKU1 Not Detected     Coronavirus NL63 Not Detected     Coronavirus OC43 Not Detected     COVID19 Not Detected     Human Metapneumovirus Not Detected     Human Rhinovirus/Enterovirus Not Detected     Influenza A PCR Not Detected     Influenza B PCR Not Detected     Parainfluenza Virus 1 Not Detected     Parainfluenza Virus 2 Not Detected     Parainfluenza Virus 3 Not Detected     Parainfluenza Virus 4 Not Detected     RSV, PCR Not Detected     Bordetella pertussis pcr Not Detected     Bordetella parapertussis PCR Not Detected     Chlamydophila pneumoniae PCR Not Detected     Mycoplasma pneumo by PCR Not Detected    Narrative:      In the setting of a positive respiratory panel with a viral infection PLUS a negative procalcitonin without other underlying concern for bacterial infection, consider observing off antibiotics or discontinuation of antibiotics and continue supportive care. If the respiratory panel is positive for atypical bacterial infection (Bordetella pertussis, Chlamydophila pneumoniae, or Mycoplasma pneumoniae), consider antibiotic de-escalation to target  atypical bacterial infection.          EEG    Result Date: 4/7/2023  Reason for referral: 47 y.o.male with seizure, altered mental status Technical Summary:  A 19 channel digital EEG was performed using the international 10-20 placement system, including eye leads and EKG leads. Duration: 22 minutes Findings: The patient is awake.  The background shows diffuse medium amplitude 4-6 Hz intermixed delta and theta activity which is present symmetrically over both hemispheres.  A clear posterior rhythm is not seen.  EMG artifact is variably prominent anteriorly.  Photic stimulation does not change the background.  Hyperventilation is not performed.  Sleep is seen with slowing of the tracing and vertex waves and sleep spindles.  No focal features are seen.  No epileptiform activity is present.  No ongoing seizures are seen. Video: Available Technical quality: Superior EKG: Regular, 70 beats per SUMMARY: Mild-moderate generalized slow No focal features or epileptiform activity are seen     Impression: Diffuse cerebral dysfunction of mild degree but nonspecific No ongoing seizures are seen This report is transcribed using the Dragon dictation system.      CT Abdomen Pelvis With & Without Contrast    Result Date: 4/8/2023  CT CHEST W WO CONTRAST DIAGNOSTIC, CT ABDOMEN PELVIS W WO CONTRAST Date of Exam: 4/8/2023 7:08 PM EDT Indication: Brain mass, evaluate for malignancy, metastatic workup Comparison: CT head without contrast, MRI brain 4/7/2023 Technique: Axial CT images were obtained of the chest , abdomen, and pelvis before and after the uneventful intravenous administration of 100 mL Isovue 300.  Reconstructed coronal and sagittal images were also obtained. Automated exposure control and iterative construction methods were used. Findings: Chest: Thoracic aortic arch branch vasculature is patent. Heart size is normal. Negative for pericardial effusion. No pulmonary embolus. Negative for mediastinal, hilar, or axillary  adenopathy. Bilateral gynecomastia noted. Trachea and mainstem bronchi are patent. Lungs without consolidation. No pneumothorax. Mild linear atelectasis at the left lower lobe. No suspicious pulmonary nodule or mass. Thoracic vertebral bodies are maintained in height. Negative for aggressive osseous lesion or fracture. Mild wall thickening of distal esophagus which may relate to reflux or esophagitis. Abdomen and pelvis: Initial noncontrast imaging demonstrates no radiodense renal calculus. No ureteral calculus. Subsequently post contrast images were obtained. Post surgical changes of prior hepatic transplant. No liver lesion. The spleen is normal in size. Adrenal glands  are normal. Pancreas without evidence of pancreatitis. Is no pancreatic mass. Gallbladder absent. No biliary dilatation. Kidneys are symmetrically enhancing. There is no enhancing or suspicious renal mass. No hydronephrosis or hydroureter. Urinary bladder is thin-walled. Prostate normal in size. Delayed images demonstrate normal contrast excretion into the nondilated ureters. Negative for pneumoperitoneum. No bowel obstruction. No fluid collection. The appendix is normal. Fluid-filled distention of the stomach. The portal vein and hepatic veins are patent. The splenic vein and superior mesenteric vein are patent. There are prominent venous collaterals in the left upper quadrant extending into the left mid and lower abdomen adjacent to the infrarenal aorta which may relate to sequela of prior portal hypertension given history of liver transplant. Negative for central mesenteric or retroperitoneal adenopathy. No aggressive osseous lesion or acute fracture. Disc narrowing lower lumbar spine at L5-S1.     Impression: Impression: 1. No findings of primary malignancy or metastatic adenopathy in the chest, abdomen, or pelvis. 2. Post surgical changes of liver transplant. 3. Wall thickening at mid and distal esophagus, correlate for findings of  reflux/esophagitis. 4. Additional incidental chronic findings above. Electronically Signed: Juan Chandler  4/8/2023 8:52 PM EDT  Workstation ID: [id number]    CT Head Without Contrast    Result Date: 4/7/2023  CT HEAD WO CONTRAST Date of Exam: 4/7/2023 11:50 AM EDT Indication: new onset sz. Comparison: None available. Technique: Axial CT images were obtained of the head without contrast administration.  Reconstructed coronal and sagittal images were also obtained. Automated exposure control and iterative construction methods were used. Findings: Faint hypodensity is seen within the left basal ganglia, likely either chronic lacunar infarct or prominent perivascular space. There is otherwise no evidence of intracranial hemorrhage, mass or mass effect. The ventricles are normal in size and configuration. The orbits are normal. The paranasal sinuses are grossly clear. The calvarium is intact.     Impression: Impression: Faint hypodensity is seen within the left basal ganglia, likely either chronic lacunar infarct or prominent perivascular space. No acute intracranial abnormality otherwise. Electronically Signed: Christiano Nino  4/7/2023 12:15 PM EDT  Workstation ID: [id number]    CT Chest With & Without Contrast Diagnostic    Result Date: 4/8/2023  CT CHEST W WO CONTRAST DIAGNOSTIC, CT ABDOMEN PELVIS W WO CONTRAST Date of Exam: 4/8/2023 7:08 PM EDT Indication: Brain mass, evaluate for malignancy, metastatic workup Comparison: CT head without contrast, MRI brain 4/7/2023 Technique: Axial CT images were obtained of the chest , abdomen, and pelvis before and after the uneventful intravenous administration of 100 mL Isovue 300.  Reconstructed coronal and sagittal images were also obtained. Automated exposure control and iterative construction methods were used. Findings: Chest: Thoracic aortic arch branch vasculature is patent. Heart size is normal. Negative for pericardial effusion. No pulmonary embolus. Negative for mediastinal,  hilar, or axillary adenopathy. Bilateral gynecomastia noted. Trachea and mainstem bronchi are patent. Lungs without consolidation. No pneumothorax. Mild linear atelectasis at the left lower lobe. No suspicious pulmonary nodule or mass. Thoracic vertebral bodies are maintained in height. Negative for aggressive osseous lesion or fracture. Mild wall thickening of distal esophagus which may relate to reflux or esophagitis. Abdomen and pelvis: Initial noncontrast imaging demonstrates no radiodense renal calculus. No ureteral calculus. Subsequently post contrast images were obtained. Post surgical changes of prior hepatic transplant. No liver lesion. The spleen is normal in size. Adrenal glands  are normal. Pancreas without evidence of pancreatitis. Is no pancreatic mass. Gallbladder absent. No biliary dilatation. Kidneys are symmetrically enhancing. There is no enhancing or suspicious renal mass. No hydronephrosis or hydroureter. Urinary bladder is thin-walled. Prostate normal in size. Delayed images demonstrate normal contrast excretion into the nondilated ureters. Negative for pneumoperitoneum. No bowel obstruction. No fluid collection. The appendix is normal. Fluid-filled distention of the stomach. The portal vein and hepatic veins are patent. The splenic vein and superior mesenteric vein are patent. There are prominent venous collaterals in the left upper quadrant extending into the left mid and lower abdomen adjacent to the infrarenal aorta which may relate to sequela of prior portal hypertension given history of liver transplant. Negative for central mesenteric or retroperitoneal adenopathy. No aggressive osseous lesion or acute fracture. Disc narrowing lower lumbar spine at L5-S1.     Impression: Impression: 1. No findings of primary malignancy or metastatic adenopathy in the chest, abdomen, or pelvis. 2. Post surgical changes of liver transplant. 3. Wall thickening at mid and distal esophagus, correlate for  findings of reflux/esophagitis. 4. Additional incidental chronic findings above. Electronically Signed: Juan Chandler  4/8/2023 8:52 PM EDT  Workstation ID: [id number]    MRI Brain With & Without Contrast    Result Date: 4/7/2023  MRI BRAIN W WO CONTRAST Date of Exam: 4/7/2023 3:30 PM EDT Indication: new onset sz protocol.  Comparison: CT earlier the same day Technique:  Routine multiplanar/multisequence sequence images of the brain were obtained before and after the uneventful administration of  20 mL Multihance. Findings: Corresponding to the area of apparent low density within the left basal ganglia seen on same-day CT, there is a somewhat irregular, partially cystic and avidly enhancing mass measuring 2.2 x 2.0 cm. There is either a separate nodule or small satellite nodule present posteriorly measuring 8 mm, also enhancing. Moderate surrounding edema is present on FLAIR sequences, extending to involve the left medial temporal lobe. No additional mass or abnormal enhancement is present. The ventricles are normal in size and configuration. No acute infarct is present on diffusion weighted sequences. The orbits are normal. The paranasal sinuses are grossly clear.     Impression: Impression: 2.2 cm enhancing and partially cystic mass of the left basal ganglia as above, with surrounding edema extending towards the insular and left temporal regions, favoring primary glial neoplasm over metastasis. Recommend neurosurgical consultation. Electronically Signed: Christiano Nino  4/7/2023 4:32 PM EDT  Workstation ID: [id number]    XR Chest 1 View    Result Date: 4/7/2023  XR CHEST 1 VW Date of Exam: 4/7/2023 10:53 AM EDT Indication: Weak/Dizzy/AMS triage protocol. Comparison: None available. FINDINGS: No focal airspace opacity. No pleural effusion or pneumothorax. Normal heart and mediastinal contours.     Impression: No evidence of acute disease in the chest. Electronically Signed: Christiano Nino  4/7/2023 11:31 AM EDT   Workstation ID: [id number]          Current medications:  Scheduled Meds:levETIRAcetam, 1,000 mg, Oral, Q12H  sodium chloride, 10 mL, Intravenous, Q12H  tacrolimus, 1.5 mg, Oral, Q12H      Continuous Infusions:   PRN Meds:.•  acetaminophen  •  Calcium Replacement - Follow Nurse / BPA Driven Protocol  •  LORazepam  •  LORazepam  •  Magnesium Standard Dose Replacement - Follow Nurse / BPA Driven Protocol  •  ondansetron  •  Phosphorus Replacement - Follow Nurse / BPA Driven Protocol  •  Potassium Replacement - Follow Nurse / BPA Driven Protocol  •  sodium chloride  •  sodium chloride  •  sodium chloride    Assessment & Plan   Assessment & Plan     Active Hospital Problems    Diagnosis  POA   • **Seizures [R56.9]  Yes   • Liver transplanted [Z94.4]  Not Applicable   • Immunosuppressed status [D84.9]  Yes      Resolved Hospital Problems   No resolved problems to display.        Brief Hospital Course to date:  Cheo Garrett is a 47 y.o. male with PMHx significant for liver cancer s/p Liver transplant at [facility] (2013) on chronic immunosuppressant therapy who presented today via EMS after wife found him having a seizure at home this morning.     Brain Mass  New Onset Seizures  Confusion  - MRI brain with 2.2 cm enhancing and partially cystic mass of the left basal ganglia with surrounding edema extending towards the insular and left temporal regions, favoring primary glial neoplasm   - d/w Dr. Garcia yesterday, continue kera BID  - appreciate Neurosurgery input, recommend biopsy, tentatively planned for Tuesday  - CT chest/abd/pelvis with no findings concerning for primary malignancy  - ID consult per NSGY recommendations, infectious work-up ongoing  - seizure precautions, ativan PRN  - SLP evaluation, PT/OT following     Lactic Acidosis:  - likely secondary to seizures, now resolved     Hx of Liver Transplant   Hx of Liver Cancer (data deficient)  Immunosuppressive Therapy  - stable and follows with [facility]  yearly  - Tacrolimus level pending  - liver enzymes within normal limits     Leukocytosis - resolved  - UA negative, CXR negative, CSF studies negative  - possibly reactive in setting of no fever, normal procal  - blood cultures are negative    Expected Discharge Location and Transportation: Home  Expected Discharge   3-4 days    DVT prophylaxis:  Mechanical DVT prophylaxis orders are present.     AM-PAC 6 Clicks Score (PT): 20 (04/08/23 0800)    CODE STATUS:   Code Status and Medical Interventions:   Ordered at: 04/07/23 1354     Level Of Support Discussed With:    Patient     Code Status (Patient has no pulse and is not breathing):    CPR (Attempt to Resuscitate)     Medical Interventions (Patient has pulse or is breathing):    Full Support       Roxanne Iverson,   04/09/23

## 2023-04-09 NOTE — THERAPY DISCHARGE NOTE
Acute Care - Occupational Therapy Discharge  [facility]    Patient Name: Cheo Garrett  : 1975    MRN: [id number]                              Today's Date: 2023       Admit Date: 2023    Visit Dx:     ICD-10-CM ICD-9-CM   1. New onset seizure  R56.9 780.39   2. Confusion  R41.0 298.9   3. Leukocytosis, unspecified type  D72.829 288.60   4. Hypokalemia  E87.6 276.8   5. Impaired functional mobility, balance, gait, and endurance  Z74.09 V49.89   6. Brain mass  G93.89 348.89     Patient Active Problem List   Diagnosis   • Seizures   • Liver transplanted   • Immunosuppressed status   • Brain mass     Past Medical History:   Diagnosis Date   • Liver cancer      History reviewed. No pertinent surgical history.   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  -JAXON     Existing Precautions/Restrictions fall;seizures;other (see comments)  confusion  -JAXON     Barriers to Rehab medically complex  -JAXON     Row Name 23          Occupational Profile    Environmental Supports and Barriers (Occupational Profile) wx in shower  -     Row Name 23          Living Environment    People in Home spouse;child(joe), dependent  -     Row Name 23          Home Main Entrance    Number of Stairs, Main Entrance five  -JAXON     Stair Railings, Main Entrance --  railing  -     Row Name 23          Cognition    Orientation Status (Cognition) oriented to;person;time;disoriented to;place;situation  pt. able to  state name of place and then later stating where his wife works, pt. disoriented to situation thinking therapist worked at one time in [facility] [employer] [facility].  Pt. able to recall dates, solve simple problem solving questions and repeat 3/3 words grossly 2 m  -     Row Name 04/09/23 0833          Safety Issues, Functional Mobility    Safety Issues Affecting Function (Mobility) insight into deficits/self-awareness;safety precaution awareness  -     Impairments Affecting Function (Mobility) cognition  -     Cognitive Impairments, Mobility Safety/Performance insight into deficits/self-awareness;problem-solving/reasoning  wife reporting difficulty with higher level problem solving, situation confusion, executive function  -     Comment, Safety Issues/Impairments (Mobility) pt. was able to follow directions for balance challenges, movement using directional terms  -           User Key  (r) = Recorded By, (t) = Taken By, (c) = Cosigned By    Initials Name Provider Type    Britt Bruner, OT Occupational Therapist               Mobility/ADL's     Row Name 04/09/23 0842          Bed Mobility    Comment, (Bed Mobility) pt. up standing in room on arrival  -     Row Name 04/09/23 0842          Transfers    Transfers sit-stand transfer;stand-sit transfer  -     Row Name 04/09/23 0842          Sit-Stand Transfer    Sit-Stand [place] (Transfers) independent  -     Row Name 04/09/23 0842          Stand-Sit Transfer    Stand-Sit [place] (Transfers) independent  -     Row Name 04/09/23 0842          Functional Mobility    Functional Mobility- Ind. Level independent  -     Functional Mobility- Comment pt. able to take side steps and backward steps and turn in a [place] with good balance  -     Row Name 04/09/23 0842          Activities of Daily Living    BADL Assessment/Intervention lower body dressing  -     Row Name 04/09/23 0842          Lower Body Dressing Assessment/Training    [place] Level (Lower Body  Dressing) doff;don;socks;independent  -JAXON     Position (Lower Body Dressing) edge of bed sitting  -JAXON           User Key  (r) = Recorded By, (t) = Taken By, (c) = Cosigned By    Initials Name Provider Type    Britt Bruner, OT Occupational Therapist               Obj/Interventions     Row Name 04/09/23 0843          Sensory Assessment (Somatosensory)    Sensory Assessment (Somatosensory) UE sensation intact  -JAXON     Bilateral LE Sensory Assessment light touch awareness  -JAXON     Row Name 04/09/23 0843          Vision Assessment/Intervention    Visual Impairment/Limitations corrective lenses for distance  -JAXON     Vision Assessment Comment pt. was able to navigate well in [employer], wife reports has been working on puzzles  -     Row Name 04/09/23 0843          Range of Motion Comprehensive    General Range of Motion bilateral upper extremity ROM WFL  -     Row Name 04/09/23 0843          Strength Comprehensive (MMT)    General Manual Muscle Testing (MMT) Assessment no strength deficits identified  -JAXON     Comment, General Manual Muscle Testing (MMT) Assessment BUE 5/5  -JAXON     Row Name 04/09/23 0843          Balance    Balance Assessment sitting static balance;sitting dynamic balance;standing static balance;standing dynamic balance  -JAXON     Static Sitting Balance independent  -JAXON     Dynamic Sitting Balance independent  -JAXON     Position, Sitting Balance unsupported;sitting edge of bed  -JAXON     Static Standing Balance independent  -JAXON     Dynamic Standing Balance independent  -JAXON     Position/Device Used, Standing Balance unsupported  -JAXON     Balance Interventions sit to stand;occupation based/functional task  -JAXON     Comment, Balance single leg stance grossly 15 seconds, heel to toe fair accuracy, I item retrieval from floor  -JAXON           User Key  (r) = Recorded By, (t) = Taken By, (c) = Cosigned By    Initials Name Provider Type    Britt Bruner OT Occupational Therapist               Goals/Plan    No  documentation.                Clinical Impression     Row Name 04/09/23 0845          Pain Assessment    Pretreatment Pain Rating 0/10 - no pain  -JAXON     Posttreatment Pain Rating 0/10 - no pain  -JAXON     Row Name 04/09/23 0845          Plan of Care Review    Plan of Care Reviewed With patient;spouse  -JAXON     Progress no change  -JAXON     Outcome Evaluation Pt. presents with ability to complete BADL tasks with some supervision for cues for cognitive deficit due to situational confusion and executive function deficit.  Wife is an acute care Occupational Therapist and has been able to assist pt. with some cognitive therapy.  Anticipate pt. with be able to return home with OP cognitive therapy.  -JAXON     Row Name 04/09/23 0845          Therapy Assessment/Plan (OT)    Criteria for Skilled Therapeutic Interventions Met (OT) no;skilled treatment is necessary  -JAXON     Therapy Frequency (OT) evaluation only  -     Row Name 04/09/23 0845          Therapy Plan Review/Discharge Plan (OT)    Anticipated Discharge Disposition (OT) home with assist;home with outpatient therapy services  pending future intervention  -     Row Name 04/09/23 0845          Vital Signs    O2 Delivery Pre Treatment room air  -JAXON     O2 Delivery Intra Treatment room air  -JAXON     O2 Delivery Post Treatment room air  -JAXON     Pre Patient Position Standing  -JAXON     Intra Patient Position Standing  -JAXON     Post Patient Position Sitting  -JAXON     Row Name 04/09/23 0845          Positioning and Restraints    Pre-Treatment Position standing in room  -JAXON     Post Treatment Position bed  -JAXON     In Bed sitting EOB;call light within reach;with family/caregiver  -JAXON           User Key  (r) = Recorded By, (t) = Taken By, (c) = Cosigned By    Initials Name Provider Type    Britt Bruner, OT Occupational Therapist               Outcome Measures     Row Name 04/09/23 0854          How much help from another is currently needed...    Putting on and taking off  regular lower body clothing? 3  Supervision for cueing in all areas due to cognitive deficit.  -JAXON     Bathing (including washing, rinsing, and drying) 3  -JAXON     Toileting (which includes using toilet bed pan or urinal) 3  -JAXON     Putting on and taking off regular upper body clothing 3  -JAXON     Taking care of personal grooming (such as brushing teeth) 3  -JAXON     Eating meals 3  -JAXON     AM-PAC 6 Clicks Score (OT) 18  -JAXON     Row Name 04/09/23 0854          Functional Assessment    Outcome Measure Options AM-PAC 6 Clicks Daily Activity (OT)  -JAXON           User Key  (r) = Recorded By, (t) = Taken By, (c) = Cosigned By    Initials Name Provider Type    Britt Bruner, OT Occupational Therapist              Occupational Therapy Education     Title: PT OT SLP Therapies (In Progress)     Topic: Occupational Therapy (In Progress)     Point: ADL training (Done)     Description:   Instruct learner(s) on proper safety adaptation and remediation techniques during self care or transfers.   Instruct in proper use of assistive devices.              Learning Progress Summary           Patient Acceptance, E, VU by JAXON at 4/9/2023 0855    Comment: evaluation results   Significant Other Acceptance, E, VU by JAXON at 4/9/2023 0855    Comment: evaluation results                   Point: Home exercise program (Not Started)     Description:   Instruct learner(s) on appropriate technique for monitoring, assisting and/or progressing therapeutic exercises/activities.              Learner Progress:  Not documented in this visit.          Point: Precautions (Not Started)     Description:   Instruct learner(s) on prescribed precautions during self-care and functional transfers.              Learner Progress:  Not documented in this visit.          Point: Body mechanics (Not Started)     Description:   Instruct learner(s) on proper positioning and spine alignment during self-care, functional mobility activities and/or exercises.               Learner Progress:  Not documented in this visit.                      User Key     Initials Effective Dates Name Provider Type [facility] 02/03/23 -  Britt Montes OT Occupational Therapist OT              OT Recommendation and Plan  Therapy Frequency (OT): evaluation only  Plan of Care Review  Plan of Care Reviewed With: patient, spouse  Progress: no change  Outcome Evaluation: Pt. presents with ability to complete BADL tasks with some supervision for cues for cognitive deficit due to situational confusion and executive function deficit.  Wife is an acute care Occupational Therapist and has been able to assist pt. with some cognitive therapy.  Anticipate pt. with be able to return home with OP cognitive therapy.  Plan of Care Reviewed With: patient, spouse  Outcome Evaluation: Pt. presents with ability to complete BADL tasks with some supervision for cues for cognitive deficit due to situational confusion and executive function deficit.  Wife is an acute care Occupational Therapist and has been able to assist pt. with some cognitive therapy.  Anticipate pt. with be able to return home with OP cognitive therapy.     Time Calculation:    Time Calculation- OT     Row Name 04/09/23 0856             Time Calculation- OT    OT Start Time 0807  -JAXON      OT Received On 04/09/23  -JAXON         Untimed Charges    OT Eval/Re-eval Minutes 44  -JAXON         Total Minutes    Untimed Charges Total Minutes 44  -JAXON       Total Minutes 44  -JAXON            User Key  (r) = Recorded By, (t) = Taken By, (c) = Cosigned By    Initials Name Provider Type    Britt Bruner OT Occupational Therapist              Therapy Charges for Today     Code Description Service Date Service Provider Modifiers Qty    81315982058 HC OT EVAL LOW COMPLEXITY 3 4/9/2023 Britt Montes OT GO 1             OT Discharge Summary  Anticipated Discharge Disposition (OT): home with assist, home with outpatient therapy services (pending future  intervention)    Britt Montes, OT  4/9/2023

## 2023-04-09 NOTE — PLAN OF CARE
Goal Outcome Evaluation:  Plan of Care Reviewed With: patient, spouse        Progress: no change  Outcome Evaluation: Pt. presents with ability to complete BADL tasks with some supervision for cues for cognitive deficit due to situational confusion and executive function deficit.  Wife is an acute care Occupational Therapist and has been able to assist pt. with some cognitive therapy.  Anticipate pt. with be able to return home with OP cognitive therapy.

## 2023-04-09 NOTE — PROGRESS NOTES
Chief complaint: Brain mass, seizures    Admit Diagnosis:   Seizure [R56.9]  Seizures [R56.9]     Subjective: No events overnight    Objective:    Vitals:    23 0715   BP: 127/84   Pulse: 79   Resp: 18   Temp: 98.2 °F (36.8 °C)   SpO2: 91%     Pulse  Av.8  Min: 79  Max: 97  Systolic (24hrs), Av , Min:125 , Max:143     Diastolic (24hrs), Av, Min:75, Max:95    Temp (24hrs), Av °F (36.7 °C), Min:97.6 °F (36.4 °C), Max:98.3 °F (36.8 °C)      He is sitting in the bedside couch.  He is alert, pleasant, conversant, and appropriate.  He is ambulating without difficulty.  Cranial nerves II through XII are grossly intact.    Lab Results   Component Value Date     2023       A/P:   Admit Diagnosis:   Seizure [R56.9]  Seizures [R56.9]     He certainly is going to need a biopsy of this mass.  The location of the mass is very problematic.  I discussed the rationale for needle biopsy versus an open biopsy/resection.  I think this will be very difficult to completely remove and a pterional, trans sylvian approach certainly carries the risk of MCA  stroke.  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  He is tentatively scheduled for needle biopsy on Tuesday.  Final recommendations will be left tomorrow.

## 2023-04-09 NOTE — PROGRESS NOTES
INFECTIOUS DISEASE PROGRESS NOTE    Cheo Garrett  1975  [id number]    Date of Consult: 4/8/2023    Admission Date: 4/7/2023      Requesting Provider: Roxanne Iverson DO  Evaluating Physician: Duncan Pradhan MD    Reason for Consultation: immunocompromised, brain lesion    History of present illness:    4/8/23: Patient is a 47 y.o. male with h/o liver cancer and liver transplantation 2013 at [facility]/on Tacrolimus who we were asked to see for possible infectious brain lesion.  The patient was brought to [facility] ED on 4/7 by EMS after his wife witness the patient having a seizure at her family's home. They are from [place], but are visiting family near [place], [place].   The patient has no h/o seizures and has been relatively healthy since his liver transplantation.   He states he does no remember leading up to his being taken to [facility].  On arrival, the patient is afebrile.  Admitting labs are WBC 18,200 with 86% neutrophils, lactic acid 6.4, PCT 0.03, Creatinine 1.08, ammonia 49, and UA WBC 0-2.  A UDS was negative.  A respiratory panel PCR was negative.  Blood cultures are negative to date. A CXR showed no acute findings.  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  He underwent an LP yesterday with WBC CSF zero, glucose 88, and protein 30.7. A Meningitis/encephalitis panel PCR was negative.  A toxoplasma PCR from CSF is pending.  A CSF culture is negative to date. He is currently on no antibiotics. ID was asked to evaluate.  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     4/9/23:He denies headache, earache, sore throat.  He has remained afebrile.  He has had no further seizures.His white blood cell count today is 9.7.  His creatinine is 0.8.  Serum cryptococcal antigen was negative.  He denies nausea, vomiting, diarrhea.    Past Medical History:   Diagnosis Date   • Liver cancer         History reviewed. No pertinent surgical history.   Liver transplantation 2013    History reviewed. No pertinent family history.    Social History     Socioeconomic History   • Marital status:    Denies tobacco, alcohol, or illicit drug use.   Lives in    No Known Allergies      Medication:    Current Facility-Administered Medications:   •  acetaminophen (TYLENOL) tablet 650 mg, 650 mg, Oral, Q4H PRN, Roxanne Iverson R, DO  •  Calcium Replacement - Follow Nurse / BPA Driven Protocol, , Does not apply, PRN, Roxanne Iverson R, DO  •  levETIRAcetam (KEPPRA) tablet 1,000 mg, 1,000 mg, Oral, Q12H, Alyssa Galloway MD, 1,000 mg at 23 0850  •  LORazepam (ATIVAN) injection 1 mg, 1 mg, Intravenous, Q2H PRN, Roxanne Iverson, DO  •  LORazepam (ATIVAN) injection 2 mg, 2 mg, Intravenous, Q5 Min PRN, Alyssa Galloway MD  •  Magnesium Standard Dose Replacement - Follow Nurse / BPA Driven Protocol, , Does not apply, PRN, Roxanne Iverson R, DO  •  ondansetron (ZOFRAN) injection 4 mg, 4 mg, Intravenous, Q6H PRN, Roxanne Iverson, DO  •  Phosphorus Replacement - Follow Nurse / BPA Driven Protocol, , Does not apply, PRN, Roxanne Iverson, DO  •  Potassium Replacement - Follow Nurse / BPA Driven Protocol, , Does not apply, PRN, Roxanne Iverson R, DO  •  sodium chloride 0.9 % flush 10 mL, 10 mL, Intravenous, PRN, Roxanne Iverson, DO, 10 mL at 23 1913  •  sodium chloride 0.9 % flush 10 mL, 10 mL, Intravenous, Q12H, Roxanne Iverson, DO, 10 mL at 23 0850  •  sodium chloride 0.9 % flush 10 mL, 10 mL, Intravenous, PRN, Roxanne Iverson R, DO  •  sodium chloride 0.9 % infusion 40 mL, 40 mL, Intravenous, PRN, Roxanne Iverson R, DO  •  tacrolimus (PROGRAF) capsule 1.5 mg, 1.5 mg, Oral, Q12H, Roxanne Iverson, DO, 1.5 mg at 23 1006    Antibiotics:  Anti-Infectives (From admission, onward)    None            Review of Systems:  See above      Physical Exam:   Vital Signs  Temp (24hrs), Av.1  °F (36.7 °C), Min:97.7 °F (36.5 °C), Max:98.3 °F (36.8 °C)    Temp  Min: 97.7 °F (36.5 °C)  Max: 98.3 °F (36.8 °C)  BP  Min: 125/79  Max: 143/85  Pulse  Min: 79  Max: 102  Resp  Min: 18  Max: 18  SpO2  Min: 91 %  Max: 100 %    GENERAL: Awake and alert, in no acute distress.   HEENT: Normocephalic, atraumatic.  PERRL. EOMI. No conjunctival injection. No icterus. Oropharynx clear without evidence of thrush or exudate.  NECK: Supple  LYMPH: No cervical, axillary or inguinal lymphadenopathy.  HEART: RRR; No murmur, rubs, gallops.   LUNGS: Clear to auscultation bilaterally without wheezing, rales, rhonchi. Normal respiratory effort. Nonlabored.  ABDOMEN: Soft, nontender, nondistended.. No rebound or guarding. NO mass or HSM  :  Without Bailey catheter.  MSK: No joint effusions or erythema  SKIN: Warm and dry without cutaneous eruptions on Inspection/palpation.    NEURO: Oriented to PPT.  Motor 5/5 strength  PSYCHIATRIC: Normal insight and judgment. Cooperative with PE    Laboratory Data    Results from last 7 days   Lab Units 04/09/23  0640 04/08/23  0538 04/07/23  1053   WBC 10*3/mm3 9.66 9.94 18.22*   HEMOGLOBIN g/dL 15.3 17.2 16.6   HEMATOCRIT % 43.9 47.4 46.4   PLATELETS 10*3/mm3 145 168 161     Results from last 7 days   Lab Units 04/09/23  0640   SODIUM mmol/L 144   POTASSIUM mmol/L 3.7   CHLORIDE mmol/L 107   CO2 mmol/L 26.0   BUN mg/dL 16   CREATININE mg/dL 0.80   GLUCOSE mg/dL 97   CALCIUM mg/dL 9.3     Results from last 7 days   Lab Units 04/07/23  1053   ALK PHOS U/L 48   BILIRUBIN mg/dL 0.5   ALT (SGPT) U/L 27   AST (SGOT) U/L 24             Results from last 7 days   Lab Units 04/07/23  1826   LACTATE mmol/L 1.7             Estimated Creatinine Clearance: 157.8 mL/min (by C-G formula based on SCr of 0.8 mg/dL).      Microbiology:  Microbiology Results (last 10 days)     Procedure Component Value - Date/Time    Meningitis / Encephalitis Panel, PCR - Cerebrospinal Fluid, Lumbar Puncture [[id number]]  (Normal)  Collected: 04/07/23 1418    Lab Status: Final result Specimen: Cerebrospinal Fluid from Lumbar Puncture Updated: 04/07/23 1553     ESCHERICHIA COLI K1, PCR Not Detected     HAEMOPHILUS INFLUENZAE, PCR Not Detected     LISTERIA MONOCYTOGENES, PCR Not Detected     NEISSERIA MENINGITIDIS, PCR Not Detected     STREPTOCOCCUS AGALACTIAE, PCR Not Detected     STREPTOCOCCUS PNEUMONIAE, PCR Not Detected     CYTOMEGALOVIRUS (CMV), PCR Not Detected     ENTEROVIRUS, PCR Not Detected     HERPES SIMPLEX VIRUS 1 (HSV-1), PCR Not Detected     HERPES SIMPLEX VIRUS 2 (HSV-2), PCR Not Detected     HUMAN PARECHOVIRUS, PCR Not Detected     VARICELLA ZOSTER VIRUS (VZV), PCR Not Detected     CRYPTOCOCCUS NEOFORMANS / GATTII, PCR Not Detected     HUMAN HERPES VIRUS 6 PCR Not Detected    Culture, CSF - Cerebrospinal Fluid, Lumbar Puncture [[id number]] Collected: 04/07/23 1304    Lab Status: Preliminary result Specimen: Cerebrospinal Fluid from Lumbar Puncture Updated: 04/09/23 0718     CSF Culture No growth at 2 days     Gram Stain No WBCs or organisms seen    Cryptococcal AG, CSF - Cerebrospinal Fluid, Lumbar Puncture [[id number]]  (Normal) Collected: 04/07/23 1304    Lab Status: Final result Specimen: Cerebrospinal Fluid from Lumbar Puncture Updated: 04/09/23 0734     Cryptococcal Antigen, CSF Negative    Blood Culture - Blood, Hand, Right [[id number]]  (Normal) Collected: 04/07/23 1243    Lab Status: Preliminary result Specimen: Blood from Hand, Right Updated: 04/09/23 1301     Blood Culture No growth at 2 days    Blood Culture - Blood, Hand, Left [[id number]]  (Normal) Collected: 04/07/23 1243    Lab Status: Preliminary result Specimen: Blood from Hand, Left Updated: 04/09/23 1301     Blood Culture No growth at 2 days    Respiratory Panel PCR w/COVID-19(SARS-CoV-2) JESSY/TAMMY/ULISES/PAD/COR/MAD/WALESKA In-House, NP Swab in UTM/VTM, 3-4 HR TAT - Swab, Nasopharynx [[id number]]  (Normal) Collected: 04/07/23 1227    Lab Status: Final result Specimen: Swab  from Nasopharynx Updated: 04/07/23 1349     ADENOVIRUS, PCR Not Detected     Coronavirus 229E Not Detected     Coronavirus HKU1 Not Detected     Coronavirus NL63 Not Detected     Coronavirus OC43 Not Detected     COVID19 Not Detected     Human Metapneumovirus Not Detected     Human Rhinovirus/Enterovirus Not Detected     Influenza A PCR Not Detected     Influenza B PCR Not Detected     Parainfluenza Virus 1 Not Detected     Parainfluenza Virus 2 Not Detected     Parainfluenza Virus 3 Not Detected     Parainfluenza Virus 4 Not Detected     RSV, PCR Not Detected     Bordetella pertussis pcr Not Detected     Bordetella parapertussis PCR Not Detected     Chlamydophila pneumoniae PCR Not Detected     Mycoplasma pneumo by PCR Not Detected    Narrative:      In the setting of a positive respiratory panel with a viral infection PLUS a negative procalcitonin without other underlying concern for bacterial infection, consider observing off antibiotics or discontinuation of antibiotics and continue supportive care. If the respiratory panel is positive for atypical bacterial infection (Bordetella pertussis, Chlamydophila pneumoniae, or Mycoplasma pneumoniae), consider antibiotic de-escalation to target atypical bacterial infection.                Radiology:  Imaging Results (Last 72 Hours)     Procedure Component Value Units Date/Time    CT Chest With & Without Contrast Diagnostic [[id number]] Collected: 04/08/23 2031     Updated: 04/08/23 2055    Narrative:      CT CHEST W WO CONTRAST DIAGNOSTIC, CT ABDOMEN PELVIS W WO CONTRAST    Date of Exam: 4/8/2023 7:08 PM EDT    Indication: Brain mass, evaluate for malignancy, metastatic workup    Comparison: CT head without contrast, MRI brain 4/7/2023    Technique: Axial CT images were obtained of the chest , abdomen, and pelvis before and after the uneventful intravenous administration of 100 mL Isovue 300.  Reconstructed coronal and sagittal images were also obtained. Automated  exposure control and   iterative construction methods were used.    Findings:  Chest:  Thoracic aortic arch branch vasculature is patent. Heart size is normal. Negative for pericardial effusion. No pulmonary embolus. Negative for mediastinal, hilar, or axillary adenopathy. Bilateral gynecomastia noted.    Trachea and mainstem bronchi are patent. Lungs without consolidation. No pneumothorax. Mild linear atelectasis at the left lower lobe. No suspicious pulmonary nodule or mass. Thoracic vertebral bodies are maintained in height. Negative for aggressive   osseous lesion or fracture. Mild wall thickening of distal esophagus which may relate to reflux or esophagitis.    Abdomen and pelvis:  Initial noncontrast imaging demonstrates no radiodense renal calculus. No ureteral calculus. Subsequently post contrast images were obtained. Post surgical changes of prior hepatic transplant. No liver lesion. The spleen is normal in size. Adrenal glands   are normal. Pancreas without evidence of pancreatitis. Is no pancreatic mass. Gallbladder absent. No biliary dilatation.     Kidneys are symmetrically enhancing. There is no enhancing or suspicious renal mass. No hydronephrosis or hydroureter. Urinary bladder is thin-walled. Prostate normal in size. Delayed images demonstrate normal contrast excretion into the nondilated   ureters.    Negative for pneumoperitoneum. No bowel obstruction. No fluid collection. The appendix is normal. Fluid-filled distention of the stomach. The portal vein and hepatic veins are patent. The splenic vein and superior mesenteric vein are patent. There are   prominent venous collaterals in the left upper quadrant extending into the left mid and lower abdomen adjacent to the infrarenal aorta which may relate to sequela of prior portal hypertension given history of liver transplant. Negative for central   mesenteric or retroperitoneal adenopathy. No aggressive osseous lesion or acute fracture. Disc narrowing  lower lumbar spine at L5-S1.      Impression:      Impression:  1. No findings of primary malignancy or metastatic adenopathy in the chest, abdomen, or pelvis.  2. Post surgical changes of liver transplant.  3. Wall thickening at mid and distal esophagus, correlate for findings of reflux/esophagitis.  4. Additional incidental chronic findings above.    Electronically Signed: Juan Ramesh    4/8/2023 8:52 PM EDT    Workstation ID: [id number]    CT Abdomen Pelvis With & Without Contrast [[id number]] Collected: 04/08/23 2031     Updated: 04/08/23 2055    Narrative:      CT CHEST W WO CONTRAST DIAGNOSTIC, CT ABDOMEN PELVIS W WO CONTRAST    Date of Exam: 4/8/2023 7:08 PM EDT    Indication: Brain mass, evaluate for malignancy, metastatic workup    Comparison: CT head without contrast, MRI brain 4/7/2023    Technique: Axial CT images were obtained of the chest , abdomen, and pelvis before and after the uneventful intravenous administration of 100 mL Isovue 300.  Reconstructed coronal and sagittal images were also obtained. Automated exposure control and   iterative construction methods were used.    Findings:  Chest:  Thoracic aortic arch branch vasculature is patent. Heart size is normal. Negative for pericardial effusion. No pulmonary embolus. Negative for mediastinal, hilar, or axillary adenopathy. Bilateral gynecomastia noted.    Trachea and mainstem bronchi are patent. Lungs without consolidation. No pneumothorax. Mild linear atelectasis at the left lower lobe. No suspicious pulmonary nodule or mass. Thoracic vertebral bodies are maintained in height. Negative for aggressive   osseous lesion or fracture. Mild wall thickening of distal esophagus which may relate to reflux or esophagitis.    Abdomen and pelvis:  Initial noncontrast imaging demonstrates no radiodense renal calculus. No ureteral calculus. Subsequently post contrast images were obtained. Post surgical changes of prior hepatic transplant. No liver lesion. The  spleen is normal in size. Adrenal glands   are normal. Pancreas without evidence of pancreatitis. Is no pancreatic mass. Gallbladder absent. No biliary dilatation.     Kidneys are symmetrically enhancing. There is no enhancing or suspicious renal mass. No hydronephrosis or hydroureter. Urinary bladder is thin-walled. Prostate normal in size. Delayed images demonstrate normal contrast excretion into the nondilated   ureters.    Negative for pneumoperitoneum. No bowel obstruction. No fluid collection. The appendix is normal. Fluid-filled distention of the stomach. The portal vein and hepatic veins are patent. The splenic vein and superior mesenteric vein are patent. There are   prominent venous collaterals in the left upper quadrant extending into the left mid and lower abdomen adjacent to the infrarenal aorta which may relate to sequela of prior portal hypertension given history of liver transplant. Negative for central   mesenteric or retroperitoneal adenopathy. No aggressive osseous lesion or acute fracture. Disc narrowing lower lumbar spine at L5-S1.      Impression:      Impression:  1. No findings of primary malignancy or metastatic adenopathy in the chest, abdomen, or pelvis.  2. Post surgical changes of liver transplant.  3. Wall thickening at mid and distal esophagus, correlate for findings of reflux/esophagitis.  4. Additional incidental chronic findings above.    Electronically Signed: Juan Chandler    4/8/2023 8:52 PM EDT    Workstation ID: [id number]    MRI Brain With & Without Contrast [[id number]] Collected: 04/07/23 1627     Updated: 04/07/23 1636    Narrative:        MRI BRAIN W WO CONTRAST    Date of Exam: 4/7/2023 3:30 PM EDT    Indication: new onset sz protocol.     Comparison: CT earlier the same day    Technique:  Routine multiplanar/multisequence sequence images of the brain were obtained before and after the uneventful administration of  20 mL Multihance.    Findings:   Corresponding to the area of  apparent low density within the left basal ganglia seen on same-day CT, there is a somewhat irregular, partially cystic and avidly enhancing mass measuring 2.2 x 2.0 cm. There is either a separate nodule or small satellite   nodule present posteriorly measuring 8 mm, also enhancing. Moderate surrounding edema is present on FLAIR sequences, extending to involve the left medial temporal lobe. No additional mass or abnormal enhancement is present. The ventricles are normal in   size and configuration. No acute infarct is present on diffusion weighted sequences. The orbits are normal. The paranasal sinuses are grossly clear.      Impression:      Impression:   2.2 cm enhancing and partially cystic mass of the left basal ganglia as above, with surrounding edema extending towards the insular and left temporal regions, favoring primary glial neoplasm over metastasis. Recommend neurosurgical consultation.    Electronically Signed: Christiano Nino    4/7/2023 4:32 PM EDT    Workstation ID: [id number]    CT Head Without Contrast [[id number]] Collected: 04/07/23 1213     Updated: 04/07/23 1218    Narrative:      CT HEAD WO CONTRAST    Date of Exam: 4/7/2023 11:50 AM EDT    Indication: new onset sz.    Comparison: None available.    Technique: Axial CT images were obtained of the head without contrast administration.  Reconstructed coronal and sagittal images were also obtained. Automated exposure control and iterative construction methods were used.    Findings:  Faint hypodensity is seen within the left basal ganglia, likely either chronic lacunar infarct or prominent perivascular space. There is otherwise no evidence of intracranial hemorrhage, mass or mass effect. The ventricles are normal in size and   configuration. The orbits are normal. The paranasal sinuses are grossly clear. The calvarium is intact.      Impression:      Impression:  Faint hypodensity is seen within the left basal ganglia, likely either chronic lacunar  infarct or prominent perivascular space.     No acute intracranial abnormality otherwise.    Electronically Signed: Christiano Nino    4/7/2023 12:15 PM EDT    Workstation ID: [id number]    XR Chest 1 View [[id number]] Collected: 04/07/23 1131     Updated: 04/07/23 1134    Narrative:      XR CHEST 1 VW    Date of Exam: 4/7/2023 10:53 AM EDT    Indication: Weak/Dizzy/AMS triage protocol.    Comparison: None available.    FINDINGS: No focal airspace opacity. No pleural effusion or pneumothorax. Normal heart and mediastinal contours.      Impression:      No evidence of acute disease in the chest.     Electronically Signed: Christiano Nino    4/7/2023 11:31 AM EDT    Workstation ID: [id number]      I read his radiographic images.  I reviewed the images with the patient and his wife yesterday.      Impression:   1. 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  Since he is an immunocompromised transplant patient, he will be at risk for opportunistic infection.  I doubt that we will be able to make a diagnosis based on his CSF.  He will require a biopsy for diagnosis.  Potential infectious etiologies include a aerobic/anaerobic bacterial infection, cryptococcoma, other fungal infections such as histoplasmosis/blastomycosis, tuberculoma, toxoplasmosis, nocardia, and actinomyces.  NATALI virus induced PML generally has multifocal lesions but this would be another consideration.  Brain tissue should be sent for histology, aerobic/anaerobic/mycobacterial/fungal cultures and I would like to save tissue to send for 16s ribosomal RNA next generation sequencing at the [facility] if the pathology does not demonstrate tumor and is more suggestive of infection.   2. New onset of seizure.  On Keppra  3. Leukocytosis/neutrophilia, stress vs infection  4. Lactic acidosis, stress vs other  5. H/o Liver cancer s/p liver transplant 2013 on Tacrolimus.  6. Immunocompromised host.    PLAN/RECOMMENDATIONS:   1. Toxoplasma PCR on  CSF  2. Quantiferon gold TB--pending, and Toxoplasma serologies--pending.  3. Histoplasma urinary antigen and Blastomyces urinary antigen--pending  4. Awaiting biopsy of brain lesion on 4/11, please save some tissue for 16s ribosomal RNA next generation sequencing studies.    5. Follow off antibiotics for now.    His high complexity medical problems required high complexity medical decision making today.      Duncan Pradhan MD  4/9/2023  19:41 EDT

## 2023-04-09 NOTE — PROGRESS NOTES
"DOS: 2023  NAME: Cheo Garrett   : 1975  PCP: Provider, No Known  Chief Complaint   Patient presents with   • Altered Mental Status       Chief complaint: He has a slight headache but nothing too much to complain about.  Subjective: No further seizures noted and he is mentally back to baseline.    Objective:  Vital signs: /85 (BP Location: Right arm, Patient Position: Lying)   Pulse 88   Temp 98.2 °F (36.8 °C) (Oral)   Resp 18   Ht 193 cm (75.98\")   Wt 109 kg (239 lb 12.8 oz)   SpO2 96%   BMI 29.20 kg/m²    Gen: NAD, vitals reviewed  MS: Back to baseline.  CN: Cranials 2-12: No focal deficits noted.  Motor: Muscles of both upper and lower extremities show good bulk power and tone.  Sensory: No sensory loss noted.  DTRs: 2+ bilaterally with downgoing toes.  Coordination: Normal finger-to-nose coordination.  Gait: He is able to get up and ambulate independently and his Romberg is negative and the Floyd balance test is negative.    ROS:  General: Pleasant gentleman currently getting up and walking independently.  Neurological: No focal neurological deficits noted.  Has a mild headache.    Laboratory results:  Lab Results   Component Value Date    GLUCOSE 97 2023    CALCIUM 9.3 2023     2023    K 3.7 2023    CO2 26.0 2023     2023    BUN 16 2023    CREATININE 0.80 2023    BCR 20.0 2023    ANIONGAP 11.0 2023     Lab Results   Component Value Date    WBC 9.66 2023    HGB 15.3 2023    HCT 43.9 2023    MCV 86.6 2023     2023          Review of labs: Electrolytes are normal and the CBC is completely unremarkable.    Review and interpretation of imaging: The MRI of the brain with and without contrast was reviewed in details that shows the following:    Findings:   Corresponding to the area of apparent low density within the left basal ganglia seen on same-day CT, there is a somewhat irregular, " partially cystic and avidly enhancing mass measuring 2.2 x 2.0 cm. There is either a separate nodule or small satellite   nodule present posteriorly measuring 8 mm, also enhancing. Moderate surrounding edema is present on FLAIR sequences, extending to involve the left medial temporal lobe. No additional mass or abnormal enhancement is present. The ventricles are normal in   size and configuration. No acute infarct is present on diffusion weighted sequences. The orbits are normal. The paranasal sinuses are grossly clear.     IMPRESSION:  Impression:   2.2 cm enhancing and partially cystic mass of the left basal ganglia as above, with surrounding edema extending towards the insular and left temporal regions, favoring primary glial neoplasm over metastasis. Recommend neurosurgical consultation    Workup to date: An EEG was performed that shows the following:    Findings:     The patient is awake.  The background shows diffuse medium amplitude 4-6 Hz intermixed delta and theta activity which is present symmetrically over both hemispheres.  A clear posterior rhythm is not seen.  EMG artifact is variably prominent anteriorly.  Photic stimulation does not change the background.  Hyperventilation is not performed.  Sleep is seen with slowing of the tracing and vertex waves and sleep spindles.  No focal features are seen.  No epileptiform activity is present.  No ongoing seizures are seen.     Video: Available     Technical quality: Superior     EKG: Regular, 70 beats per     SUMMARY:     Mild-moderate generalized slow     No focal features or epileptiform activity are seen     IMPRESSION:     Diffuse cerebral dysfunction of mild degree but nonspecific     No ongoing seizures are seen    Diagnoses: Left temporal frontal seizure focus which is in conjunction with the brain mass noted.    CT scan of the chest abdomen and pelvis were reviewed in details that shows the following:  Chest:  Thoracic aortic arch branch vasculature  is patent. Heart size is normal. Negative for pericardial effusion. No pulmonary embolus. Negative for mediastinal, hilar, or axillary adenopathy. Bilateral gynecomastia noted.     Trachea and mainstem bronchi are patent. Lungs without consolidation. No pneumothorax. Mild linear atelectasis at the left lower lobe. No suspicious pulmonary nodule or mass. Thoracic vertebral bodies are maintained in height. Negative for aggressive   osseous lesion or fracture. Mild wall thickening of distal esophagus which may relate to reflux or esophagitis.     Abdomen and pelvis:  Initial noncontrast imaging demonstrates no radiodense renal calculus. No ureteral calculus. Subsequently post contrast images were obtained. Post surgical changes of prior hepatic transplant. No liver lesion. The spleen is normal in size. Adrenal glands   are normal. Pancreas without evidence of pancreatitis. Is no pancreatic mass. Gallbladder absent. No biliary dilatation.      Kidneys are symmetrically enhancing. There is no enhancing or suspicious renal mass. No hydronephrosis or hydroureter. Urinary bladder is thin-walled. Prostate normal in size. Delayed images demonstrate normal contrast excretion into the nondilated   ureters.     Negative for pneumoperitoneum. No bowel obstruction. No fluid collection. The appendix is normal. Fluid-filled distention of the stomach. The portal vein and hepatic veins are patent. The splenic vein and superior mesenteric vein are patent. There are   prominent venous collaterals in the left upper quadrant extending into the left mid and lower abdomen adjacent to the infrarenal aorta which may relate to sequela of prior portal hypertension given history of liver transplant. Negative for central   mesenteric or retroperitoneal adenopathy. No aggressive osseous lesion or acute fracture. Disc narrowing lower lumbar spine at L5-S1.     IMPRESSION:  Impression:  1. No findings of primary malignancy or metastatic adenopathy  in the chest, abdomen, or pelvis.  2. Post surgical changes of liver transplant.  3. Wall thickening at mid and distal esophagus, correlate for findings of reflux/esophagitis.  4. Additional incidental chronic findings above.       Comment: Patient with most probably primary brain tumor the appearance of which seems to be more likely to be glioblastoma multiforme.    Plan:  1.  Patient will be getting stereotactic biopsy of the brain tumor coming Tuesday with Dr. Gibbons.  2.  Discussed that he can start getting up and walking around.  3.  He has been also instructed not to drive or use any hazardous equipment or engage in any hazardous activities until seizure-free for 6 months as per the state law of [place].    Discussed with the patient and his wife and the nursing staff and he will be followed up by our inpatient neurology team.    Spent a total of 30 minutes in face-to-face evaluation and management of the patient.    Electronically signed by Alyssa Galloway MD, 04/09/23, 4:35 PM EDT.

## 2023-04-09 NOTE — CASE MANAGEMENT/SOCIAL WORK
Discharge Planning Assessment  [facility]     Patient Name: Cheo Garrett  MRN: [id number]  Today's Date: 4/9/2023    Admit Date: 4/7/2023    Plan: Initial   Discharge Needs Assessment     Row Name 04/09/23 1421       Living Environment    People in Home spouse    Current Living Arrangements home    Primary Care Provided by self    Provides Primary Care For no one    Family Caregiver if Needed spouse    Quality of Family Relationships helpful;involved;supportive    Able to Return to Prior Arrangements yes       Transition Planning    Patient/Family Anticipates Transition to inpatient rehabilitation facility    Transportation Anticipated family or friend will provide;health plan transportation       Discharge Needs Assessment    Equipment Currently Used at Home none    Concerns to be Addressed adjustment to diagnosis/illness;discharge planning    Discharge Facility/Level of Care Needs acute rehab;nursing facility, skilled               Discharge Plan     Row Name 04/09/23 1422       Plan    Plan Initial    Patient/Family in Agreement with Plan yes    Plan Comments Mr. Garrett resides in [place] with his spouse.   He is independent with ADL's and does not use any DME.  He is self employed as a farmer.  He is not current with home health or PT.  He and his spouse were her visiting and patient had seizure.  Spouse is an occupational therapist.  If rehab is needed she would like referral to [facility].  CM will continue to follow for discharge planning.  Plan pending patients progress.    Final Discharge Disposition Code 30 - still a patient              Continued Care and Services - Admitted Since 4/7/2023    Coordination has not been started for this encounter.          Demographic Summary     Row Name 04/09/23 1419       General Information    Reason for Consult discharge planning    Preferred Language English    Row Name 04/09/23 1411                                Functional Status     Row Name  04/09/23 1420       Functional Status    Usual Activity Tolerance good    Current Activity Tolerance good       Functional Status, IADL    Medications independent    Meal Preparation independent    Housekeeping independent    Laundry independent    Shopping independent       Employment/    Employment Status self-employed  sol    Row Name 04/09/23 1412                                                        Employment/    Employment Status self-employed  Farmer               Psychosocial    No documentation.                Abuse/Neglect    No documentation.                Legal    No documentation.                Substance Abuse    No documentation.                Patient Forms    No documentation.                   Karma Flynn RN

## 2023-04-10 LAB
BACTERIA SPEC AEROBE CULT: NORMAL
GRAM STN SPEC: NORMAL
LABORATORY COMMENT REPORT: NORMAL
T GONDII IGG SERPL IA-ACNC: <3 IU/ML (ref 0–7.1)
T GONDII IGM SER IA-ACNC: <3 AU/ML (ref 0–7.9)

## 2023-04-10 PROCEDURE — 99232 SBSQ HOSP IP/OBS MODERATE 35: CPT | Performed by: INTERNAL MEDICINE

## 2023-04-10 PROCEDURE — 86480 TB TEST CELL IMMUN MEASURE: CPT | Performed by: PHYSICIAN ASSISTANT

## 2023-04-10 PROCEDURE — 99232 SBSQ HOSP IP/OBS MODERATE 35: CPT | Performed by: PSYCHIATRY & NEUROLOGY

## 2023-04-10 PROCEDURE — 63710000001 TACROLIMUS PER 1 MG: Performed by: INTERNAL MEDICINE

## 2023-04-10 RX ADMIN — Medication 10 ML: at 08:46

## 2023-04-10 RX ADMIN — TACROLIMUS 1.5 MG: 0.5 CAPSULE ORAL at 21:46

## 2023-04-10 RX ADMIN — ACETAMINOPHEN 325MG 650 MG: 325 TABLET ORAL at 20:43

## 2023-04-10 RX ADMIN — Medication 10 ML: at 20:44

## 2023-04-10 RX ADMIN — LEVETIRACETAM 1000 MG: 500 TABLET, FILM COATED ORAL at 08:45

## 2023-04-10 RX ADMIN — TACROLIMUS 1.5 MG: 0.5 CAPSULE ORAL at 08:45

## 2023-04-10 RX ADMIN — LEVETIRACETAM 1000 MG: 500 TABLET, FILM COATED ORAL at 20:43

## 2023-04-10 NOTE — PROGRESS NOTES
INFECTIOUS DISEASE PROGRESS NOTE    Cheo Garrett  1975  [id number]    Date of Consult: 4/8/2023    Admission Date: 4/7/2023      Requesting Provider: Roxanne Iverson DO  Evaluating Physician: Duncan Pradhan MD    Reason for Consultation: immunocompromised, brain lesion    History of present illness:    4/8/23: Patient is a 47 y.o. male with h/o liver cancer and liver transplantation 2013 at [facility]/on Tacrolimus who we were asked to see for possible infectious brain lesion.  The patient was brought to [facility] ED on 4/7 by EMS after his wife witness the patient having a seizure at her family's home. They are from [place], but are visiting family near [place], [place].   The patient has no h/o seizures and has been relatively healthy since his liver transplantation.   He states he does no remember leading up to his being taken to [facility].  On arrival, the patient is afebrile.  Admitting labs are WBC 18,200 with 86% neutrophils, lactic acid 6.4, PCT 0.03, Creatinine 1.08, ammonia 49, and UA WBC 0-2.  A UDS was negative.  A respiratory panel PCR was negative.  Blood cultures are negative to date. A CXR showed no acute findings.  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  He underwent an LP yesterday with WBC CSF zero, glucose 88, and protein 30.7. A Meningitis/encephalitis panel PCR was negative.  A toxoplasma PCR from CSF is pending.  A CSF culture is negative to date. He is currently on no antibiotics. ID was asked to evaluate.  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     4/9/23:He denies headache, earache, sore throat.  He has remained afebrile.  He has had no further seizures.His white blood cell count today is 9.7.  His creatinine is 0.8.  Serum cryptococcal antigen was negative.  He denies nausea, vomiting, diarrhea.    4/10/23: He has remained afebrile. CSF and serum  cryptococcal antigens were negative. Toxoplasma serology is pending.  QuantiFERON Gold TB test is pending.  Urinary histoplasma Blastomyces antigens are pending. Brain biopsy is scheduled for tomorrow.  He complains of a mild headache.  Denies nausea and vomiting.    Past Medical History:   Diagnosis Date   • Liver cancer        History reviewed. No pertinent surgical history.   Liver transplantation 2013    History reviewed. No pertinent family history.    Social History     Socioeconomic History   • Marital status:    Denies tobacco, alcohol, or illicit drug use.   Lives in    No Known Allergies      Medication:    Current Facility-Administered Medications:   •  acetaminophen (TYLENOL) tablet 650 mg, 650 mg, Oral, Q4H PRN, Roxanne Iverson, DO  •  Calcium Replacement - Follow Nurse / BPA Driven Protocol, , Does not apply, PRN, Roxanne Iverson, DO  •  levETIRAcetam (KEPPRA) tablet 1,000 mg, 1,000 mg, Oral, Q12H, Alyssa Galloway MD, 1,000 mg at 04/09/23 2104  •  LORazepam (ATIVAN) injection 1 mg, 1 mg, Intravenous, Q2H PRN, Roxanne Iverson, DO  •  LORazepam (ATIVAN) injection 2 mg, 2 mg, Intravenous, Q5 Min PRN, Alyssa Galloway MD  •  Magnesium Standard Dose Replacement - Follow Nurse / BPA Driven Protocol, , Does not apply, PRN, Roxanne Iverson R, DO  •  ondansetron (ZOFRAN) injection 4 mg, 4 mg, Intravenous, Q6H PRN, Roxanne Iverson, DO  •  Phosphorus Replacement - Follow Nurse / BPA Driven Protocol, , Does not apply, PRN, Roxanne Iverson, DO  •  Potassium Replacement - Follow Nurse / BPA Driven Protocol, , Does not apply, PRN, Roxanne Iverson R, DO  •  sodium chloride 0.9 % flush 10 mL, 10 mL, Intravenous, PRN, Roxanne Iverson, DO, 10 mL at 04/07/23 1913  •  sodium chloride 0.9 % flush 10 mL, 10 mL, Intravenous, Q12H, Roxanne Iverson, DO, 10 mL at 04/09/23 0850  •  sodium chloride 0.9 % flush 10 mL, 10 mL, Intravenous, PRN, Zayra, Roxanne R, DO  •  sodium chloride 0.9 % infusion 40  mL, 40 mL, Intravenous, PRN, Roxanne Iverson DO  •  tacrolimus (PROGRAF) capsule 1.5 mg, 1.5 mg, Oral, Q12H, Roxanne Iverson DO, 1.5 mg at 23    Antibiotics:  Anti-Infectives (From admission, onward)    None            Review of Systems:  See above      Physical Exam:   Vital Signs  Temp (24hrs), Av °F (36.7 °C), Min:97.6 °F (36.4 °C), Max:98.4 °F (36.9 °C)    Temp  Min: 97.6 °F (36.4 °C)  Max: 98.4 °F (36.9 °C)  BP  Min: 124/82  Max: 143/85  Pulse  Min: 65  Max: 102  Resp  Min: 16  Max: 18  SpO2  Min: 91 %  Max: 100 %    GENERAL: Awake and alert, in no acute distress.   HEENT: Normocephalic, atraumatic.  PERRL. EOMI. No conjunctival injection. No icterus. Oropharynx clear without evidence of thrush or exudate.  NECK: Supple  LYMPH: No cervical, axillary or inguinal lymphadenopathy.  HEART: RRR; No murmur, rubs, gallops.   LUNGS: Clear to auscultation bilaterally without wheezing, rales, rhonchi. Normal respiratory effort. Nonlabored.  ABDOMEN: Soft, nontender, nondistended.. No rebound or guarding. NO mass or HSM  :  Without Bailey catheter.  MSK: No joint effusions or erythema  SKIN: Warm and dry without cutaneous eruptions on Inspection/palpation.    NEURO: Oriented to PPT.  Motor 5/5 strength  PSYCHIATRIC: Normal insight and judgment. Cooperative with PE    Laboratory Data    Results from last 7 days   Lab Units 23  0640 23  0538 23  1053   WBC 10*3/mm3 9.66 9.94 18.22*   HEMOGLOBIN g/dL 15.3 17.2 16.6   HEMATOCRIT % 43.9 47.4 46.4   PLATELETS 10*3/mm3 145 168 161     Results from last 7 days   Lab Units 23  0640   SODIUM mmol/L 144   POTASSIUM mmol/L 3.7   CHLORIDE mmol/L 107   CO2 mmol/L 26.0   BUN mg/dL 16   CREATININE mg/dL 0.80   GLUCOSE mg/dL 97   CALCIUM mg/dL 9.3     Results from last 7 days   Lab Units 23  1053   ALK PHOS U/L 48   BILIRUBIN mg/dL 0.5   ALT (SGPT) U/L 27   AST (SGOT) U/L 24             Results from last 7 days   Lab Units 23  1823    LACTATE mmol/L 1.7             Estimated Creatinine Clearance: 157.8 mL/min (by C-G formula based on SCr of 0.8 mg/dL).      Microbiology:  Microbiology Results (last 10 days)     Procedure Component Value - Date/Time    Meningitis / Encephalitis Panel, PCR - Cerebrospinal Fluid, Lumbar Puncture [[id number]]  (Normal) Collected: 04/07/23 1418    Lab Status: Final result Specimen: Cerebrospinal Fluid from Lumbar Puncture Updated: 04/07/23 1553     ESCHERICHIA COLI K1, PCR Not Detected     HAEMOPHILUS INFLUENZAE, PCR Not Detected     LISTERIA MONOCYTOGENES, PCR Not Detected     NEISSERIA MENINGITIDIS, PCR Not Detected     STREPTOCOCCUS AGALACTIAE, PCR Not Detected     STREPTOCOCCUS PNEUMONIAE, PCR Not Detected     CYTOMEGALOVIRUS (CMV), PCR Not Detected     ENTEROVIRUS, PCR Not Detected     HERPES SIMPLEX VIRUS 1 (HSV-1), PCR Not Detected     HERPES SIMPLEX VIRUS 2 (HSV-2), PCR Not Detected     HUMAN PARECHOVIRUS, PCR Not Detected     VARICELLA ZOSTER VIRUS (VZV), PCR Not Detected     CRYPTOCOCCUS NEOFORMANS / GATTII, PCR Not Detected     HUMAN HERPES VIRUS 6 PCR Not Detected    Culture, CSF - Cerebrospinal Fluid, Lumbar Puncture [[id number]] Collected: 04/07/23 1304    Lab Status: Preliminary result Specimen: Cerebrospinal Fluid from Lumbar Puncture Updated: 04/09/23 0718     CSF Culture No growth at 2 days     Gram Stain No WBCs or organisms seen    Cryptococcal AG, CSF - Cerebrospinal Fluid, Lumbar Puncture [[id number]]  (Normal) Collected: 04/07/23 1304    Lab Status: Final result Specimen: Cerebrospinal Fluid from Lumbar Puncture Updated: 04/09/23 0734     Cryptococcal Antigen, CSF Negative    Blood Culture - Blood, Hand, Right [[id number]]  (Normal) Collected: 04/07/23 1243    Lab Status: Preliminary result Specimen: Blood from Hand, Right Updated: 04/09/23 1301     Blood Culture No growth at 2 days    Blood Culture - Blood, Hand, Left [[id number]]  (Normal) Collected: 04/07/23 1243    Lab Status: Preliminary  result Specimen: Blood from Hand, Left Updated: 04/09/23 1301     Blood Culture No growth at 2 days    Respiratory Panel PCR w/COVID-19(SARS-CoV-2) JESSY/TAMMY/ULISES/PAD/COR/MAD/WALESKA In-House, NP Swab in UTM/VTM, 3-4 HR TAT - Swab, Nasopharynx [[id number]]  (Normal) Collected: 04/07/23 1227    Lab Status: Final result Specimen: Swab from Nasopharynx Updated: 04/07/23 1349     ADENOVIRUS, PCR Not Detected     Coronavirus 229E Not Detected     Coronavirus HKU1 Not Detected     Coronavirus NL63 Not Detected     Coronavirus OC43 Not Detected     COVID19 Not Detected     Human Metapneumovirus Not Detected     Human Rhinovirus/Enterovirus Not Detected     Influenza A PCR Not Detected     Influenza B PCR Not Detected     Parainfluenza Virus 1 Not Detected     Parainfluenza Virus 2 Not Detected     Parainfluenza Virus 3 Not Detected     Parainfluenza Virus 4 Not Detected     RSV, PCR Not Detected     Bordetella pertussis pcr Not Detected     Bordetella parapertussis PCR Not Detected     Chlamydophila pneumoniae PCR Not Detected     Mycoplasma pneumo by PCR Not Detected    Narrative:      In the setting of a positive respiratory panel with a viral infection PLUS a negative procalcitonin without other underlying concern for bacterial infection, consider observing off antibiotics or discontinuation of antibiotics and continue supportive care. If the respiratory panel is positive for atypical bacterial infection (Bordetella pertussis, Chlamydophila pneumoniae, or Mycoplasma pneumoniae), consider antibiotic de-escalation to target atypical bacterial infection.                Radiology:  Imaging Results (Last 72 Hours)     Procedure Component Value Units Date/Time    CT Chest With & Without Contrast Diagnostic [[id number]] Collected: 04/08/23 2031     Updated: 04/08/23 2055    Narrative:      CT CHEST W WO CONTRAST DIAGNOSTIC, CT ABDOMEN PELVIS W WO CONTRAST    Date of Exam: 4/8/2023 7:08 PM EDT    Indication: Brain mass, evaluate for  malignancy, metastatic workup    Comparison: CT head without contrast, MRI brain 4/7/2023    Technique: Axial CT images were obtained of the chest , abdomen, and pelvis before and after the uneventful intravenous administration of 100 mL Isovue 300.  Reconstructed coronal and sagittal images were also obtained. Automated exposure control and   iterative construction methods were used.    Findings:  Chest:  Thoracic aortic arch branch vasculature is patent. Heart size is normal. Negative for pericardial effusion. No pulmonary embolus. Negative for mediastinal, hilar, or axillary adenopathy. Bilateral gynecomastia noted.    Trachea and mainstem bronchi are patent. Lungs without consolidation. No pneumothorax. Mild linear atelectasis at the left lower lobe. No suspicious pulmonary nodule or mass. Thoracic vertebral bodies are maintained in height. Negative for aggressive   osseous lesion or fracture. Mild wall thickening of distal esophagus which may relate to reflux or esophagitis.    Abdomen and pelvis:  Initial noncontrast imaging demonstrates no radiodense renal calculus. No ureteral calculus. Subsequently post contrast images were obtained. Post surgical changes of prior hepatic transplant. No liver lesion. The spleen is normal in size. Adrenal glands   are normal. Pancreas without evidence of pancreatitis. Is no pancreatic mass. Gallbladder absent. No biliary dilatation.     Kidneys are symmetrically enhancing. There is no enhancing or suspicious renal mass. No hydronephrosis or hydroureter. Urinary bladder is thin-walled. Prostate normal in size. Delayed images demonstrate normal contrast excretion into the nondilated   ureters.    Negative for pneumoperitoneum. No bowel obstruction. No fluid collection. The appendix is normal. Fluid-filled distention of the stomach. The portal vein and hepatic veins are patent. The splenic vein and superior mesenteric vein are patent. There are   prominent venous collaterals in  the left upper quadrant extending into the left mid and lower abdomen adjacent to the infrarenal aorta which may relate to sequela of prior portal hypertension given history of liver transplant. Negative for central   mesenteric or retroperitoneal adenopathy. No aggressive osseous lesion or acute fracture. Disc narrowing lower lumbar spine at L5-S1.      Impression:      Impression:  1. No findings of primary malignancy or metastatic adenopathy in the chest, abdomen, or pelvis.  2. Post surgical changes of liver transplant.  3. Wall thickening at mid and distal esophagus, correlate for findings of reflux/esophagitis.  4. Additional incidental chronic findings above.    Electronically Signed: Juan Chandler    4/8/2023 8:52 PM EDT    Workstation ID: [id number]    CT Abdomen Pelvis With & Without Contrast [[id number]] Collected: 04/08/23 2031     Updated: 04/08/23 2055    Narrative:      CT CHEST W WO CONTRAST DIAGNOSTIC, CT ABDOMEN PELVIS W WO CONTRAST    Date of Exam: 4/8/2023 7:08 PM EDT    Indication: Brain mass, evaluate for malignancy, metastatic workup    Comparison: CT head without contrast, MRI brain 4/7/2023    Technique: Axial CT images were obtained of the chest , abdomen, and pelvis before and after the uneventful intravenous administration of 100 mL Isovue 300.  Reconstructed coronal and sagittal images were also obtained. Automated exposure control and   iterative construction methods were used.    Findings:  Chest:  Thoracic aortic arch branch vasculature is patent. Heart size is normal. Negative for pericardial effusion. No pulmonary embolus. Negative for mediastinal, hilar, or axillary adenopathy. Bilateral gynecomastia noted.    Trachea and mainstem bronchi are patent. Lungs without consolidation. No pneumothorax. Mild linear atelectasis at the left lower lobe. No suspicious pulmonary nodule or mass. Thoracic vertebral bodies are maintained in height. Negative for aggressive   osseous lesion or fracture.  Mild wall thickening of distal esophagus which may relate to reflux or esophagitis.    Abdomen and pelvis:  Initial noncontrast imaging demonstrates no radiodense renal calculus. No ureteral calculus. Subsequently post contrast images were obtained. Post surgical changes of prior hepatic transplant. No liver lesion. The spleen is normal in size. Adrenal glands   are normal. Pancreas without evidence of pancreatitis. Is no pancreatic mass. Gallbladder absent. No biliary dilatation.     Kidneys are symmetrically enhancing. There is no enhancing or suspicious renal mass. No hydronephrosis or hydroureter. Urinary bladder is thin-walled. Prostate normal in size. Delayed images demonstrate normal contrast excretion into the nondilated   ureters.    Negative for pneumoperitoneum. No bowel obstruction. No fluid collection. The appendix is normal. Fluid-filled distention of the stomach. The portal vein and hepatic veins are patent. The splenic vein and superior mesenteric vein are patent. There are   prominent venous collaterals in the left upper quadrant extending into the left mid and lower abdomen adjacent to the infrarenal aorta which may relate to sequela of prior portal hypertension given history of liver transplant. Negative for central   mesenteric or retroperitoneal adenopathy. No aggressive osseous lesion or acute fracture. Disc narrowing lower lumbar spine at L5-S1.      Impression:      Impression:  1. No findings of primary malignancy or metastatic adenopathy in the chest, abdomen, or pelvis.  2. Post surgical changes of liver transplant.  3. Wall thickening at mid and distal esophagus, correlate for findings of reflux/esophagitis.  4. Additional incidental chronic findings above.    Electronically Signed: Juan Chandler    4/8/2023 8:52 PM EDT    Workstation ID: [id number]    MRI Brain With & Without Contrast [[id number]] Collected: 04/07/23 1627     Updated: 04/07/23 1636    Narrative:        MRI BRAIN W WO  CONTRAST    Date of Exam: 4/7/2023 3:30 PM EDT    Indication: new onset sz protocol.     Comparison: CT earlier the same day    Technique:  Routine multiplanar/multisequence sequence images of the brain were obtained before and after the uneventful administration of  20 mL Multihance.    Findings:   Corresponding to the area of apparent low density within the left basal ganglia seen on same-day CT, there is a somewhat irregular, partially cystic and avidly enhancing mass measuring 2.2 x 2.0 cm. There is either a separate nodule or small satellite   nodule present posteriorly measuring 8 mm, also enhancing. Moderate surrounding edema is present on FLAIR sequences, extending to involve the left medial temporal lobe. No additional mass or abnormal enhancement is present. The ventricles are normal in   size and configuration. No acute infarct is present on diffusion weighted sequences. The orbits are normal. The paranasal sinuses are grossly clear.      Impression:      Impression:   2.2 cm enhancing and partially cystic mass of the left basal ganglia as above, with surrounding edema extending towards the insular and left temporal regions, favoring primary glial neoplasm over metastasis. Recommend neurosurgical consultation.    Electronically Signed: Christiano Nino    4/7/2023 4:32 PM EDT    Workstation ID: [id number]    CT Head Without Contrast [[id number]] Collected: 04/07/23 1213     Updated: 04/07/23 1218    Narrative:      CT HEAD WO CONTRAST    Date of Exam: 4/7/2023 11:50 AM EDT    Indication: new onset sz.    Comparison: None available.    Technique: Axial CT images were obtained of the head without contrast administration.  Reconstructed coronal and sagittal images were also obtained. Automated exposure control and iterative construction methods were used.    Findings:  Faint hypodensity is seen within the left basal ganglia, likely either chronic lacunar infarct or prominent perivascular space. There is otherwise  no evidence of intracranial hemorrhage, mass or mass effect. The ventricles are normal in size and   configuration. The orbits are normal. The paranasal sinuses are grossly clear. The calvarium is intact.      Impression:      Impression:  Faint hypodensity is seen within the left basal ganglia, likely either chronic lacunar infarct or prominent perivascular space.     No acute intracranial abnormality otherwise.    Electronically Signed: Christiano Nino    4/7/2023 12:15 PM EDT    Workstation ID: [id number]    XR Chest 1 View [[id number]] Collected: 04/07/23 1131     Updated: 04/07/23 1134    Narrative:      XR CHEST 1 VW    Date of Exam: 4/7/2023 10:53 AM EDT    Indication: Weak/Dizzy/AMS triage protocol.    Comparison: None available.    FINDINGS: No focal airspace opacity. No pleural effusion or pneumothorax. Normal heart and mediastinal contours.      Impression:      No evidence of acute disease in the chest.     Electronically Signed: Christiano Nino    4/7/2023 11:31 AM EDT    Workstation ID: [id number]      I read his radiographic images.  I reviewed the images with the patient and his wife yesterday.      Impression:   1. 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  Since he is an immunocompromised transplant patient, he will be at risk for opportunistic infection.  He will require a biopsy for diagnosis.  Potential infectious etiologies include a aerobic/anaerobic bacterial infection, cryptococcoma, other fungal infections such as histoplasmosis/blastomycosis, tuberculoma, toxoplasmosis, nocardia, and actinomyces.  NATALI virus induced PML generally has multifocal lesions but this would be another consideration.  Brain tissue should be sent for histology, aerobic/anaerobic/mycobacterial/fungal cultures and I would like to save tissue to send for 16s ribosomal RNA next generation sequencing at the [facility] if the pathology does not demonstrate tumor and is more suggestive of infection.    2. New onset of seizure.  On Keppra  3. Leukocytosis/neutrophilia, stress vs infection  4. Lactic acidosis, stress vs other  5. H/o Liver cancer s/p liver transplant 2013 on Tacrolimus.  6. Immunocompromised host.    PLAN/RECOMMENDATIONS:   1. Toxoplasma PCR on CSF  2. Quantiferon gold TB--pending, and Toxoplasma serologies--pending.  3. Histoplasma urinary antigen and Blastomyces urinary antigen--pending  4. Brain biopsy tomorrow for pathology, aerobic/anaerobic/mycobacterial/fungal culture, and an additional sample for possible 16 S ribosomal RNA next generation sequencing    I discussed his complex situation and processing of his brain tissue specimen with Ninfa Ramsey in the lab.    Duncan Pradhan MD  4/10/2023  06:36 EDT

## 2023-04-10 NOTE — PAYOR COMM NOTE
"Attention : Nancy  Phone: [phone]  Riki Garrett (47 y.o. Male)     Date of Birth   1975    Social Security Number       Address   [address] [place] [address]    Home Phone   [phone]    MRN   [id number]       [facility]    Marital Status                               Admission Date   23    Admission Type   Emergency    Admitting Provider   Roxanne Iverson DO    Attending Provider   Roxanne Iverson DO    Department, Room/Bed   [facility] 3E, S341/1       Discharge Date       Discharge Disposition       Discharge Destination                               Attending Provider: Roxanne Iverson DO    Allergies: No Known Allergies    Isolation: None   Infection: None   Code Status: CPR    Ht: 193 cm (75.98\")   Wt: 109 kg (239 lb 12.8 oz)    Admission Cmt: None   Principal Problem: Seizures [R56.9]                 Active Insurance as of 2023     Primary Coverage     Payor Plan Insurance Group Employer/Plan Group    MISC COMMERCIAL MISC COMMERCIAL [id number]     Coverage Address Coverage Phone Number Coverage Fax Number Effective Dates    [phone]  3/7/2023 - None Entered    [place] [id number]       Subscriber Name Subscriber Birth Date Member ID       RIKI GARRETT 1975 [id number]                 Emergency Contacts      (Rel.) Home Phone Work Phone Mobile Phone    Noreen Garrett (Spouse) -- -- [phone]               History & Physical      Roxanne Iverson DO at 23 [address] [place] [address] Medicine Services  HISTORY AND PHYSICAL    Patient Name: Riki Garrett  : 1975  MRN: [id number]  Primary Care Physician: Provider, No Known  Date of admission: 2023      Subjective    Subjective     Chief Complaint:  Confusion, seizure    HPI:  Riki Garrett is a 47 y.o. male with PMHx significant for liver cancer s/p Liver transplant at [facility] () on chronic immunosuppressant " therapy who presented today via EMS after wife found him having a seizure at home this morning. Patient had been having some ongoing confusion that started on Monday of this week and has progressively worsened. He denies fever/chills. No headache. He has no prior hx of seizures. He takes no other medications besides his Tacrolimus. He remains confused and thinks he is at [facility]. He does not remember the events from this morning.       Review of Systems   Gen- No fevers, chills  CV- No chest pain, palpitations  Resp- No cough, dyspnea  GI- No N/V/D, abd pain    Personal History     Past Medical History:   Diagnosis Date   • Liver cancer      Past Surgical Hx  - Liver Transplant at [facility] 2013    Family History: family history is not on file.     Social History:    Social History     Social History Narrative   • Not on file   - no smoking, or ETOH use, uses a small amount of chewing tobacco on occasion    Medications:  Available home medication information reviewed.  (Not in a hospital admission)      No Known Allergies    Objective    Objective     Vital Signs:   Temp:  [97.7 °F (36.5 °C)] 97.7 °F (36.5 °C)  Heart Rate:  [76-97] 85  Resp:  [18-20] 18  BP: (111-130)/(72-91) 130/91       Physical Exam   Constitutional: Awake, alert  Eyes: PERRLA, sclerae anicteric, no conjunctival injection  HENT: NCAT, mucous membranes moist  Neck: Supple, no thyromegaly, no lymphadenopathy, trachea midline  Respiratory: Clear to auscultation bilaterally, nonlabored respirations   Cardiovascular: RRR, no murmurs, rubs, or gallops, palpable pedal pulses bilaterally  Gastrointestinal: Positive bowel sounds, soft, nontender, nondistended  Musculoskeletal: No bilateral ankle edema, no clubbing or cyanosis to extremities  Psychiatric: Appropriate affect, cooperative  Neurologic: Oriented yo person only, mildly confused, strength symmetric in all extremities, Cranial Nerves grossly intact to confrontation, speech clear  Skin: No  gabriella      Result Review:  I have personally reviewed the results from the time of this admission to 4/7/2023 13:58 EDT and agree with these findings:  [x]  Laboratory list / accordion  [x]  Microbiology  [x]  Radiology  [x]  EKG/Telemetry   [x]  Cardiology/Vascular   [x]  Pathology  []  Old records  []  Other:  Most notable findings include:       LAB RESULTS:      Lab 04/07/23  1243 04/07/23  1053   WBC  --  18.22*   HEMOGLOBIN  --  16.6   HEMATOCRIT  --  46.4   PLATELETS  --  161   NEUTROS ABS  --  15.73*   IMMATURE GRANS (ABS)  --  0.10*   LYMPHS ABS  --  0.55*   MONOS ABS  --  1.79*   EOS ABS  --  0.01   MCV  --  86.7   PROCALCITONIN  --  0.03   LACTATE 4.0* 6.4*   PROTIME  --  14.0   INR  --  1.08         Lab 04/07/23  1053   SODIUM 140   POTASSIUM 3.1*   CHLORIDE 104   CO2 19.0*   ANION GAP 17.0*   BUN 12   CREATININE 1.08   EGFR 86.2   GLUCOSE 151*   CALCIUM 8.8   MAGNESIUM 1.9   TSH 1.350         Lab 04/07/23  1053   TOTAL PROTEIN 6.5   ALBUMIN 4.2   GLOBULIN 2.3   ALT (SGPT) 27   AST (SGOT) 24   BILIRUBIN 0.5   ALK PHOS 48         Lab 04/07/23  1053   HSTROP T 9                 UA    Urinalysis 4/7/23 4/7/23    1227 1227   Squamous Epithelial Cells, UA  0-2   Specific Gravity, UA 1.018    Ketones, UA Trace (A)    Blood, UA Moderate (2+) (A)    Leukocytes, UA Negative    Nitrite, UA Negative    RBC, UA  0-2   WBC, UA  0-2   Bacteria, UA  None Seen   (A) Abnormal value       Comments are available for some flowsheets but are not being displayed.             Microbiology Results (last 10 days)     Procedure Component Value - Date/Time    Respiratory Panel PCR w/COVID-19(SARS-CoV-2) JESSY/TAMMY/ULISES/PAD/COR/MAD/WALESKA In-House, NP Swab in UTM/VTM, 3-4 HR TAT - Swab, Nasopharynx [[id number]]  (Normal) Collected: 04/07/23 1227    Lab Status: Final result Specimen: Swab from Nasopharynx Updated: 04/07/23 1349     ADENOVIRUS, PCR Not Detected     Coronavirus 229E Not Detected     Coronavirus HKU1 Not Detected      Coronavirus NL63 Not Detected     Coronavirus OC43 Not Detected     COVID19 Not Detected     Human Metapneumovirus Not Detected     Human Rhinovirus/Enterovirus Not Detected     Influenza A PCR Not Detected     Influenza B PCR Not Detected     Parainfluenza Virus 1 Not Detected     Parainfluenza Virus 2 Not Detected     Parainfluenza Virus 3 Not Detected     Parainfluenza Virus 4 Not Detected     RSV, PCR Not Detected     Bordetella pertussis pcr Not Detected     Bordetella parapertussis PCR Not Detected     Chlamydophila pneumoniae PCR Not Detected     Mycoplasma pneumo by PCR Not Detected    Narrative:      In the setting of a positive respiratory panel with a viral infection PLUS a negative procalcitonin without other underlying concern for bacterial infection, consider observing off antibiotics or discontinuation of antibiotics and continue supportive care. If the respiratory panel is positive for atypical bacterial infection (Bordetella pertussis, Chlamydophila pneumoniae, or Mycoplasma pneumoniae), consider antibiotic de-escalation to target atypical bacterial infection.          CT Head Without Contrast    Result Date: 4/7/2023  CT HEAD WO CONTRAST Date of Exam: 4/7/2023 11:50 AM EDT Indication: new onset sz. Comparison: None available. Technique: Axial CT images were obtained of the head without contrast administration.  Reconstructed coronal and sagittal images were also obtained. Automated exposure control and iterative construction methods were used. Findings: Faint hypodensity is seen within the left basal ganglia, likely either chronic lacunar infarct or prominent perivascular space. There is otherwise no evidence of intracranial hemorrhage, mass or mass effect. The ventricles are normal in size and configuration. The orbits are normal. The paranasal sinuses are grossly clear. The calvarium is intact.     Impression: Impression: Faint hypodensity is seen within the left basal ganglia, likely either  chronic lacunar infarct or prominent perivascular space. No acute intracranial abnormality otherwise. Electronically Signed: Christiano Nino  4/7/2023 12:15 PM EDT  Workstation ID: [id number]    XR Chest 1 View    Result Date: 4/7/2023  XR CHEST 1 VW Date of Exam: 4/7/2023 10:53 AM EDT Indication: Weak/Dizzy/AMS triage protocol. Comparison: None available. FINDINGS: No focal airspace opacity. No pleural effusion or pneumothorax. Normal heart and mediastinal contours.     Impression: No evidence of acute disease in the chest. Electronically Signed: Christiano Nino  4/7/2023 11:31 AM EDT  Workstation ID: [id number]          Assessment & Plan   Assessment & Plan     Active Hospital Problems    Diagnosis  POA   • **Seizures [R56.9]  Yes   • Liver transplanted [Z94.4]  Not Applicable   • Immunosuppressed status [D84.9]  Yes     Cheo Garrett is a 47 y.o. male with PMHx significant for liver cancer s/p Liver transplant at [facility] (2013) on chronic immunosuppressant therapy who presented today via EMS after wife found him having a seizure at home this morning.    New Onset Seizure  Confusion  - unclear etiology, worrisome for meningitis in immunosuppressed patient, CSF studies pending  - d/w Dr. Garcia, will load with 1g Keppra  - EEG and MRI brain pending  - CT head with no acute findings  - seizure precautions, ativan PRN  - SLP evaluation, PT/OT    Lactic Acidosis:  - likely secondary to seizures    Hx of Liver Transplant   Immunosuppressive Therapy  - stable and follows with [facility] yearly  - check Tacrolimus level  - liver enzymes within normal limits    Leukocytosis:  - UA negative, CXR negative, CSF studies pending  - possibly reactive in setting of no fever, normal procal  - check blood cultures    DVT prophylaxis:  SCDs    CODE STATUS:    Code Status and Medical Interventions:   Ordered at: 04/07/23 1354     Level Of Support Discussed With:    Patient     Code Status (Patient has no pulse and is not breathing):  "   CPR (Attempt to Resuscitate)     Medical Interventions (Patient has pulse or is breathing):    Full Support       Expected Discharge 2-3 days       Roxanne Iverson DO  23      Electronically signed by Roxanne Iverson DO at 23 1410          Physician Progress Notes (all)      Alyssa Galloway MD at 23 1635          DOS: 2023  NAME: Cheo Garrett   : 1975  PCP: Provider, No Known  Chief Complaint   Patient presents with   • Altered Mental Status       Chief complaint: He has a slight headache but nothing too much to complain about.  Subjective: No further seizures noted and he is mentally back to baseline.    Objective:  Vital signs: /85 (BP Location: Right arm, Patient Position: Lying)   Pulse 88   Temp 98.2 °F (36.8 °C) (Oral)   Resp 18   Ht 193 cm (75.98\")   Wt 109 kg (239 lb 12.8 oz)   SpO2 96%   BMI 29.20 kg/m²    Gen: NAD, vitals reviewed  MS: Back to baseline.  CN: Cranials 2-12: No focal deficits noted.  Motor: Muscles of both upper and lower extremities show good bulk power and tone.  Sensory: No sensory loss noted.  DTRs: 2+ bilaterally with downgoing toes.  Coordination: Normal finger-to-nose coordination.  Gait: He is able to get up and ambulate independently and his Romberg is negative and the Floyd balance test is negative.    ROS:  General: Pleasant gentleman currently getting up and walking independently.  Neurological: No focal neurological deficits noted.  Has a mild headache.    Laboratory results:  Lab Results   Component Value Date    GLUCOSE 97 2023    CALCIUM 9.3 2023     2023    K 3.7 2023    CO2 26.0 2023     2023    BUN 16 2023    CREATININE 0.80 2023    BCR 20.0 2023    ANIONGAP 11.0 2023     Lab Results   Component Value Date    WBC 9.66 2023    HGB 15.3 2023    HCT 43.9 2023    MCV 86.6 2023     2023          Review of labs: " Electrolytes are normal and the CBC is completely unremarkable.    Review and interpretation of imaging: The MRI of the brain with and without contrast was reviewed in details that shows the following:    Findings:   Corresponding to the area of apparent low density within the left basal ganglia seen on same-day CT, there is a somewhat irregular, partially cystic and avidly enhancing mass measuring 2.2 x 2.0 cm. There is either a separate nodule or small satellite   nodule present posteriorly measuring 8 mm, also enhancing. Moderate surrounding edema is present on FLAIR sequences, extending to involve the left medial temporal lobe. No additional mass or abnormal enhancement is present. The ventricles are normal in   size and configuration. No acute infarct is present on diffusion weighted sequences. The orbits are normal. The paranasal sinuses are grossly clear.     IMPRESSION:  Impression:   2.2 cm enhancing and partially cystic mass of the left basal ganglia as above, with surrounding edema extending towards the insular and left temporal regions, favoring primary glial neoplasm over metastasis. Recommend neurosurgical consultation    Workup to date: An EEG was performed that shows the following:    Findings:     The patient is awake.  The background shows diffuse medium amplitude 4-6 Hz intermixed delta and theta activity which is present symmetrically over both hemispheres.  A clear posterior rhythm is not seen.  EMG artifact is variably prominent anteriorly.  Photic stimulation does not change the background.  Hyperventilation is not performed.  Sleep is seen with slowing of the tracing and vertex waves and sleep spindles.  No focal features are seen.  No epileptiform activity is present.  No ongoing seizures are seen.     Video: Available     Technical quality: Superior     EKG: Regular, 70 beats per     SUMMARY:     Mild-moderate generalized slow     No focal features or epileptiform activity are  seen     IMPRESSION:     Diffuse cerebral dysfunction of mild degree but nonspecific     No ongoing seizures are seen    Diagnoses: Left temporal frontal seizure focus which is in conjunction with the brain mass noted.    CT scan of the chest abdomen and pelvis were reviewed in details that shows the following:  Chest:  Thoracic aortic arch branch vasculature is patent. Heart size is normal. Negative for pericardial effusion. No pulmonary embolus. Negative for mediastinal, hilar, or axillary adenopathy. Bilateral gynecomastia noted.     Trachea and mainstem bronchi are patent. Lungs without consolidation. No pneumothorax. Mild linear atelectasis at the left lower lobe. No suspicious pulmonary nodule or mass. Thoracic vertebral bodies are maintained in height. Negative for aggressive   osseous lesion or fracture. Mild wall thickening of distal esophagus which may relate to reflux or esophagitis.     Abdomen and pelvis:  Initial noncontrast imaging demonstrates no radiodense renal calculus. No ureteral calculus. Subsequently post contrast images were obtained. Post surgical changes of prior hepatic transplant. No liver lesion. The spleen is normal in size. Adrenal glands   are normal. Pancreas without evidence of pancreatitis. Is no pancreatic mass. Gallbladder absent. No biliary dilatation.      Kidneys are symmetrically enhancing. There is no enhancing or suspicious renal mass. No hydronephrosis or hydroureter. Urinary bladder is thin-walled. Prostate normal in size. Delayed images demonstrate normal contrast excretion into the nondilated   ureters.     Negative for pneumoperitoneum. No bowel obstruction. No fluid collection. The appendix is normal. Fluid-filled distention of the stomach. The portal vein and hepatic veins are patent. The splenic vein and superior mesenteric vein are patent. There are   prominent venous collaterals in the left upper quadrant extending into the left mid and lower abdomen adjacent to  the infrarenal aorta which may relate to sequela of prior portal hypertension given history of liver transplant. Negative for central   mesenteric or retroperitoneal adenopathy. No aggressive osseous lesion or acute fracture. Disc narrowing lower lumbar spine at L5-S1.     IMPRESSION:  Impression:  1. No findings of primary malignancy or metastatic adenopathy in the chest, abdomen, or pelvis.  2. Post surgical changes of liver transplant.  3. Wall thickening at mid and distal esophagus, correlate for findings of reflux/esophagitis.  4. Additional incidental chronic findings above.       Comment: Patient with most probably primary brain tumor the appearance of which seems to be more likely to be glioblastoma multiforme.    Plan:  1.  Patient will be getting stereotactic biopsy of the brain tumor coming Tuesday with Dr. Gibbons.  2.  Discussed that he can start getting up and walking around.  3.  He has been also instructed not to drive or use any hazardous equipment or engage in any hazardous activities until seizure-free for 6 months as per the state law of [place].    Discussed with the patient and his wife and the nursing staff and he will be followed up by our inpatient neurology team.    Spent a total of 30 minutes in face-to-face evaluation and management of the patient.    Electronically signed by Alyssa Galloway MD, 04/09/23, 4:35 PM EDT.      Electronically signed by Alyssa Galloway MD at 04/09/23 1643     Duncan Pradhan MD at 04/09/23 1501              INFECTIOUS DISEASE PROGRESS NOTE    Cheo Garrett  1975  [id number]    Date of Consult: 4/8/2023    Admission Date: 4/7/2023      Requesting Provider: Roxanne Iverson DO  Evaluating Physician: Duncan Pradhan MD    Reason for Consultation: immunocompromised, brain lesion    History of present illness:    4/8/23: Patient is a 47 y.o. male with h/o liver cancer and liver transplantation 2013 at [facility]/on Tacrolimus who we were asked to see  for possible infectious brain lesion.  The patient was brought to [facility] ED on 4/7 by EMS after his wife witness the patient having a seizure at her family's home. They are from [place], but are visiting family near [place], [place].   The patient has no h/o seizures and has been relatively healthy since his liver transplantation.   He states he does no remember leading up to his being taken to [facility].  On arrival, the patient is afebrile.  Admitting labs are WBC 18,200 with 86% neutrophils, lactic acid 6.4, PCT 0.03, Creatinine 1.08, ammonia 49, and UA WBC 0-2.  A UDS was negative.  A respiratory panel PCR was negative.  Blood cultures are negative to date. A CXR showed no acute findings.  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  He underwent an LP yesterday with WBC CSF zero, glucose 88, and protein 30.7. A Meningitis/encephalitis panel PCR was negative.  A toxoplasma PCR from CSF is pending.  A CSF culture is negative to date. He is currently on no antibiotics. ID was asked to evaluate.  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     4/9/23:He denies headache, earache, sore throat.  He has remained afebrile.  He has had no further seizures.His white blood cell count today is 9.7.  His creatinine is 0.8.  Serum cryptococcal antigen was negative.  He denies nausea, vomiting, diarrhea.    Past Medical History:   Diagnosis Date   • Liver cancer        History reviewed. No pertinent surgical history.   Liver transplantation 2013    History reviewed. No pertinent family history.    Social History     Socioeconomic History   • Marital status:    Denies tobacco, alcohol, or illicit drug use.   Lives in    No Known Allergies      Medication:    Current Facility-Administered Medications:   •  acetaminophen (TYLENOL) tablet 650 mg, 650 mg, Oral, Q4H PRN, Roxanne Iverson, DO  •  Calcium  Replacement - Follow Nurse / BPA Driven Protocol, , Does not apply, PRN, Roxanne Iverson, DO  •  levETIRAcetam (KEPPRA) tablet 1,000 mg, 1,000 mg, Oral, Q12H, Alyssa Galloway MD, 1,000 mg at 23 0850  •  LORazepam (ATIVAN) injection 1 mg, 1 mg, Intravenous, Q2H PRN, Roxanne Iverson, DO  •  LORazepam (ATIVAN) injection 2 mg, 2 mg, Intravenous, Q5 Min PRN, Alyssa Galloway MD  •  Magnesium Standard Dose Replacement - Follow Nurse / BPA Driven Protocol, , Does not apply, PRN, Roxanne Iverson, DO  •  ondansetron (ZOFRAN) injection 4 mg, 4 mg, Intravenous, Q6H PRN, Roxanne Iverson, DO  •  Phosphorus Replacement - Follow Nurse / BPA Driven Protocol, , Does not apply, PRN, Roxanne Iverson, DO  •  Potassium Replacement - Follow Nurse / BPA Driven Protocol, , Does not apply, PRN, Roxanne Iverson R, DO  •  sodium chloride 0.9 % flush 10 mL, 10 mL, Intravenous, PRN, Roxanne Iverson, DO, 10 mL at 23 1913  •  sodium chloride 0.9 % flush 10 mL, 10 mL, Intravenous, Q12H, Roxanne Iverson, DO, 10 mL at 23 0850  •  sodium chloride 0.9 % flush 10 mL, 10 mL, Intravenous, PRN, Roxanne Iverson, DO  •  sodium chloride 0.9 % infusion 40 mL, 40 mL, Intravenous, PRN, Roxanne Iverson, DO  •  tacrolimus (PROGRAF) capsule 1.5 mg, 1.5 mg, Oral, Q12H, Roxanne Iverson, DO, 1.5 mg at 23 1006    Antibiotics:  Anti-Infectives (From admission, onward)    None            Review of Systems:  See above      Physical Exam:   Vital Signs  Temp (24hrs), Av.1 °F (36.7 °C), Min:97.7 °F (36.5 °C), Max:98.3 °F (36.8 °C)    Temp  Min: 97.7 °F (36.5 °C)  Max: 98.3 °F (36.8 °C)  BP  Min: 125/79  Max: 143/85  Pulse  Min: 79  Max: 102  Resp  Min: 18  Max: 18  SpO2  Min: 91 %  Max: 100 %    GENERAL: Awake and alert, in no acute distress.   HEENT: Normocephalic, atraumatic.  PERRL. EOMI. No conjunctival injection. No icterus. Oropharynx clear without evidence of thrush or exudate.  NECK: Supple  LYMPH: No  cervical, axillary or inguinal lymphadenopathy.  HEART: RRR; No murmur, rubs, gallops.   LUNGS: Clear to auscultation bilaterally without wheezing, rales, rhonchi. Normal respiratory effort. Nonlabored.  ABDOMEN: Soft, nontender, nondistended.. No rebound or guarding. NO mass or HSM  :  Without Bailey catheter.  MSK: No joint effusions or erythema  SKIN: Warm and dry without cutaneous eruptions on Inspection/palpation.    NEURO: Oriented to PPT.  Motor 5/5 strength  PSYCHIATRIC: Normal insight and judgment. Cooperative with PE    Laboratory Data    Results from last 7 days   Lab Units 04/09/23  0640 04/08/23  0538 04/07/23  1053   WBC 10*3/mm3 9.66 9.94 18.22*   HEMOGLOBIN g/dL 15.3 17.2 16.6   HEMATOCRIT % 43.9 47.4 46.4   PLATELETS 10*3/mm3 145 168 161     Results from last 7 days   Lab Units 04/09/23  0640   SODIUM mmol/L 144   POTASSIUM mmol/L 3.7   CHLORIDE mmol/L 107   CO2 mmol/L 26.0   BUN mg/dL 16   CREATININE mg/dL 0.80   GLUCOSE mg/dL 97   CALCIUM mg/dL 9.3     Results from last 7 days   Lab Units 04/07/23  1053   ALK PHOS U/L 48   BILIRUBIN mg/dL 0.5   ALT (SGPT) U/L 27   AST (SGOT) U/L 24             Results from last 7 days   Lab Units 04/07/23  1826   LACTATE mmol/L 1.7             Estimated Creatinine Clearance: 157.8 mL/min (by C-G formula based on SCr of 0.8 mg/dL).      Microbiology:  Microbiology Results (last 10 days)     Procedure Component Value - Date/Time    Meningitis / Encephalitis Panel, PCR - Cerebrospinal Fluid, Lumbar Puncture [[id number]]  (Normal) Collected: 04/07/23 1418    Lab Status: Final result Specimen: Cerebrospinal Fluid from Lumbar Puncture Updated: 04/07/23 1553     ESCHERICHIA COLI K1, PCR Not Detected     HAEMOPHILUS INFLUENZAE, PCR Not Detected     LISTERIA MONOCYTOGENES, PCR Not Detected     NEISSERIA MENINGITIDIS, PCR Not Detected     STREPTOCOCCUS AGALACTIAE, PCR Not Detected     STREPTOCOCCUS PNEUMONIAE, PCR Not Detected     CYTOMEGALOVIRUS (CMV), PCR Not Detected      ENTEROVIRUS, PCR Not Detected     HERPES SIMPLEX VIRUS 1 (HSV-1), PCR Not Detected     HERPES SIMPLEX VIRUS 2 (HSV-2), PCR Not Detected     HUMAN PARECHOVIRUS, PCR Not Detected     VARICELLA ZOSTER VIRUS (VZV), PCR Not Detected     CRYPTOCOCCUS NEOFORMANS / GATTII, PCR Not Detected     HUMAN HERPES VIRUS 6 PCR Not Detected    Culture, CSF - Cerebrospinal Fluid, Lumbar Puncture [[id number]] Collected: 04/07/23 1304    Lab Status: Preliminary result Specimen: Cerebrospinal Fluid from Lumbar Puncture Updated: 04/09/23 0718     CSF Culture No growth at 2 days     Gram Stain No WBCs or organisms seen    Cryptococcal AG, CSF - Cerebrospinal Fluid, Lumbar Puncture [[id number]]  (Normal) Collected: 04/07/23 1304    Lab Status: Final result Specimen: Cerebrospinal Fluid from Lumbar Puncture Updated: 04/09/23 0734     Cryptococcal Antigen, CSF Negative    Blood Culture - Blood, Hand, Right [[id number]]  (Normal) Collected: 04/07/23 1243    Lab Status: Preliminary result Specimen: Blood from Hand, Right Updated: 04/09/23 1301     Blood Culture No growth at 2 days    Blood Culture - Blood, Hand, Left [[id number]]  (Normal) Collected: 04/07/23 1243    Lab Status: Preliminary result Specimen: Blood from Hand, Left Updated: 04/09/23 1301     Blood Culture No growth at 2 days    Respiratory Panel PCR w/COVID-19(SARS-CoV-2) JESSY/TAMMY/ULISES/PAD/COR/MAD/WALESKA In-House, NP Swab in UTM/VTM, 3-4 HR TAT - Swab, Nasopharynx [[id number]]  (Normal) Collected: 04/07/23 1227    Lab Status: Final result Specimen: Swab from Nasopharynx Updated: 04/07/23 1349     ADENOVIRUS, PCR Not Detected     Coronavirus 229E Not Detected     Coronavirus HKU1 Not Detected     Coronavirus NL63 Not Detected     Coronavirus OC43 Not Detected     COVID19 Not Detected     Human Metapneumovirus Not Detected     Human Rhinovirus/Enterovirus Not Detected     Influenza A PCR Not Detected     Influenza B PCR Not Detected     Parainfluenza Virus 1 Not Detected     Parainfluenza  Virus 2 Not Detected     Parainfluenza Virus 3 Not Detected     Parainfluenza Virus 4 Not Detected     RSV, PCR Not Detected     Bordetella pertussis pcr Not Detected     Bordetella parapertussis PCR Not Detected     Chlamydophila pneumoniae PCR Not Detected     Mycoplasma pneumo by PCR Not Detected    Narrative:      In the setting of a positive respiratory panel with a viral infection PLUS a negative procalcitonin without other underlying concern for bacterial infection, consider observing off antibiotics or discontinuation of antibiotics and continue supportive care. If the respiratory panel is positive for atypical bacterial infection (Bordetella pertussis, Chlamydophila pneumoniae, or Mycoplasma pneumoniae), consider antibiotic de-escalation to target atypical bacterial infection.                Radiology:  Imaging Results (Last 72 Hours)     Procedure Component Value Units Date/Time    CT Chest With & Without Contrast Diagnostic [[id number]] Collected: 04/08/23 2031     Updated: 04/08/23 2055    Narrative:      CT CHEST W WO CONTRAST DIAGNOSTIC, CT ABDOMEN PELVIS W WO CONTRAST    Date of Exam: 4/8/2023 7:08 PM EDT    Indication: Brain mass, evaluate for malignancy, metastatic workup    Comparison: CT head without contrast, MRI brain 4/7/2023    Technique: Axial CT images were obtained of the chest , abdomen, and pelvis before and after the uneventful intravenous administration of 100 mL Isovue 300.  Reconstructed coronal and sagittal images were also obtained. Automated exposure control and   iterative construction methods were used.    Findings:  Chest:  Thoracic aortic arch branch vasculature is patent. Heart size is normal. Negative for pericardial effusion. No pulmonary embolus. Negative for mediastinal, hilar, or axillary adenopathy. Bilateral gynecomastia noted.    Trachea and mainstem bronchi are patent. Lungs without consolidation. No pneumothorax. Mild linear atelectasis at the left lower lobe. No  suspicious pulmonary nodule or mass. Thoracic vertebral bodies are maintained in height. Negative for aggressive   osseous lesion or fracture. Mild wall thickening of distal esophagus which may relate to reflux or esophagitis.    Abdomen and pelvis:  Initial noncontrast imaging demonstrates no radiodense renal calculus. No ureteral calculus. Subsequently post contrast images were obtained. Post surgical changes of prior hepatic transplant. No liver lesion. The spleen is normal in size. Adrenal glands   are normal. Pancreas without evidence of pancreatitis. Is no pancreatic mass. Gallbladder absent. No biliary dilatation.     Kidneys are symmetrically enhancing. There is no enhancing or suspicious renal mass. No hydronephrosis or hydroureter. Urinary bladder is thin-walled. Prostate normal in size. Delayed images demonstrate normal contrast excretion into the nondilated   ureters.    Negative for pneumoperitoneum. No bowel obstruction. No fluid collection. The appendix is normal. Fluid-filled distention of the stomach. The portal vein and hepatic veins are patent. The splenic vein and superior mesenteric vein are patent. There are   prominent venous collaterals in the left upper quadrant extending into the left mid and lower abdomen adjacent to the infrarenal aorta which may relate to sequela of prior portal hypertension given history of liver transplant. Negative for central   mesenteric or retroperitoneal adenopathy. No aggressive osseous lesion or acute fracture. Disc narrowing lower lumbar spine at L5-S1.      Impression:      Impression:  1. No findings of primary malignancy or metastatic adenopathy in the chest, abdomen, or pelvis.  2. Post surgical changes of liver transplant.  3. Wall thickening at mid and distal esophagus, correlate for findings of reflux/esophagitis.  4. Additional incidental chronic findings above.    Electronically Signed: Juan Chandler    4/8/2023 8:52 PM EDT    Workstation ID: [id number]     CT Abdomen Pelvis With & Without Contrast [[id number]] Collected: 04/08/23 2031     Updated: 04/08/23 2055    Narrative:      CT CHEST W WO CONTRAST DIAGNOSTIC, CT ABDOMEN PELVIS W WO CONTRAST    Date of Exam: 4/8/2023 7:08 PM EDT    Indication: Brain mass, evaluate for malignancy, metastatic workup    Comparison: CT head without contrast, MRI brain 4/7/2023    Technique: Axial CT images were obtained of the chest , abdomen, and pelvis before and after the uneventful intravenous administration of 100 mL Isovue 300.  Reconstructed coronal and sagittal images were also obtained. Automated exposure control and   iterative construction methods were used.    Findings:  Chest:  Thoracic aortic arch branch vasculature is patent. Heart size is normal. Negative for pericardial effusion. No pulmonary embolus. Negative for mediastinal, hilar, or axillary adenopathy. Bilateral gynecomastia noted.    Trachea and mainstem bronchi are patent. Lungs without consolidation. No pneumothorax. Mild linear atelectasis at the left lower lobe. No suspicious pulmonary nodule or mass. Thoracic vertebral bodies are maintained in height. Negative for aggressive   osseous lesion or fracture. Mild wall thickening of distal esophagus which may relate to reflux or esophagitis.    Abdomen and pelvis:  Initial noncontrast imaging demonstrates no radiodense renal calculus. No ureteral calculus. Subsequently post contrast images were obtained. Post surgical changes of prior hepatic transplant. No liver lesion. The spleen is normal in size. Adrenal glands   are normal. Pancreas without evidence of pancreatitis. Is no pancreatic mass. Gallbladder absent. No biliary dilatation.     Kidneys are symmetrically enhancing. There is no enhancing or suspicious renal mass. No hydronephrosis or hydroureter. Urinary bladder is thin-walled. Prostate normal in size. Delayed images demonstrate normal contrast excretion into the nondilated   ureters.    Negative for  pneumoperitoneum. No bowel obstruction. No fluid collection. The appendix is normal. Fluid-filled distention of the stomach. The portal vein and hepatic veins are patent. The splenic vein and superior mesenteric vein are patent. There are   prominent venous collaterals in the left upper quadrant extending into the left mid and lower abdomen adjacent to the infrarenal aorta which may relate to sequela of prior portal hypertension given history of liver transplant. Negative for central   mesenteric or retroperitoneal adenopathy. No aggressive osseous lesion or acute fracture. Disc narrowing lower lumbar spine at L5-S1.      Impression:      Impression:  1. No findings of primary malignancy or metastatic adenopathy in the chest, abdomen, or pelvis.  2. Post surgical changes of liver transplant.  3. Wall thickening at mid and distal esophagus, correlate for findings of reflux/esophagitis.  4. Additional incidental chronic findings above.    Electronically Signed: Juan Chandler    4/8/2023 8:52 PM EDT    Workstation ID: [id number]    MRI Brain With & Without Contrast [[id number]] Collected: 04/07/23 1627     Updated: 04/07/23 1636    Narrative:        MRI BRAIN W WO CONTRAST    Date of Exam: 4/7/2023 3:30 PM EDT    Indication: new onset sz protocol.     Comparison: CT earlier the same day    Technique:  Routine multiplanar/multisequence sequence images of the brain were obtained before and after the uneventful administration of  20 mL Multihance.    Findings:   Corresponding to the area of apparent low density within the left basal ganglia seen on same-day CT, there is a somewhat irregular, partially cystic and avidly enhancing mass measuring 2.2 x 2.0 cm. There is either a separate nodule or small satellite   nodule present posteriorly measuring 8 mm, also enhancing. Moderate surrounding edema is present on FLAIR sequences, extending to involve the left medial temporal lobe. No additional mass or abnormal enhancement is  present. The ventricles are normal in   size and configuration. No acute infarct is present on diffusion weighted sequences. The orbits are normal. The paranasal sinuses are grossly clear.      Impression:      Impression:   2.2 cm enhancing and partially cystic mass of the left basal ganglia as above, with surrounding edema extending towards the insular and left temporal regions, favoring primary glial neoplasm over metastasis. Recommend neurosurgical consultation.    Electronically Signed: Christiano Nino    4/7/2023 4:32 PM EDT    Workstation ID: [id number]    CT Head Without Contrast [[id number]] Collected: 04/07/23 1213     Updated: 04/07/23 1218    Narrative:      CT HEAD WO CONTRAST    Date of Exam: 4/7/2023 11:50 AM EDT    Indication: new onset sz.    Comparison: None available.    Technique: Axial CT images were obtained of the head without contrast administration.  Reconstructed coronal and sagittal images were also obtained. Automated exposure control and iterative construction methods were used.    Findings:  Faint hypodensity is seen within the left basal ganglia, likely either chronic lacunar infarct or prominent perivascular space. There is otherwise no evidence of intracranial hemorrhage, mass or mass effect. The ventricles are normal in size and   configuration. The orbits are normal. The paranasal sinuses are grossly clear. The calvarium is intact.      Impression:      Impression:  Faint hypodensity is seen within the left basal ganglia, likely either chronic lacunar infarct or prominent perivascular space.     No acute intracranial abnormality otherwise.    Electronically Signed: Christiano Nino    4/7/2023 12:15 PM EDT    Workstation ID: [id number]    XR Chest 1 View [[id number]] Collected: 04/07/23 1131     Updated: 04/07/23 1134    Narrative:      XR CHEST 1 VW    Date of Exam: 4/7/2023 10:53 AM EDT    Indication: Weak/Dizzy/AMS triage protocol.    Comparison: None available.    FINDINGS: No focal  airspace opacity. No pleural effusion or pneumothorax. Normal heart and mediastinal contours.      Impression:      No evidence of acute disease in the chest.     Electronically Signed: Christiano Nino    4/7/2023 11:31 AM EDT    Workstation ID: [id number]      I read his radiographic images.  I reviewed the images with the patient and his wife yesterday.      Impression:   1. 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  Since he is an immunocompromised transplant patient, he will be at risk for opportunistic infection.  I doubt that we will be able to make a diagnosis based on his CSF.  He will require a biopsy for diagnosis.  Potential infectious etiologies include a aerobic/anaerobic bacterial infection, cryptococcoma, other fungal infections such as histoplasmosis/blastomycosis, tuberculoma, toxoplasmosis, nocardia, and actinomyces.  NATALI virus induced PML generally has multifocal lesions but this would be another consideration.  Brain tissue should be sent for histology, aerobic/anaerobic/mycobacterial/fungal cultures and I would like to save tissue to send for 16s ribosomal RNA next generation sequencing at the [facility] if the pathology does not demonstrate tumor and is more suggestive of infection.   2. New onset of seizure.  On Keppra  3. Leukocytosis/neutrophilia, stress vs infection  4. Lactic acidosis, stress vs other  5. H/o Liver cancer s/p liver transplant 2013 on Tacrolimus.  6. Immunocompromised host.    PLAN/RECOMMENDATIONS:   1. Toxoplasma PCR on CSF  2. Quantiferon gold TB--pending, and Toxoplasma serologies--pending.  3. Histoplasma urinary antigen and Blastomyces urinary antigen--pending  4. Awaiting biopsy of brain lesion on 4/11, please save some tissue for 16s ribosomal RNA next generation sequencing studies.    5. Follow off antibiotics for now.    His high complexity medical problems required high complexity medical decision making today.      Duncan Pradhan,  MD  2023  19:41 EDT                    Electronically signed by Duncan Pradhan MD at 23 1947     Gonzales Gibbons MD at 23 1108          Chief complaint: Brain mass, seizures    Admit Diagnosis:   Seizure [R56.9]  Seizures [R56.9]     Subjective: No events overnight    Objective:    Vitals:    23 0715   BP: 127/84   Pulse: 79   Resp: 18   Temp: 98.2 °F (36.8 °C)   SpO2: 91%     Pulse  Av.8  Min: 79  Max: 97  Systolic (24hrs), Av , Min:125 , Max:143     Diastolic (24hrs), Av, Min:75, Max:95    Temp (24hrs), Av °F (36.7 °C), Min:97.6 °F (36.4 °C), Max:98.3 °F (36.8 °C)      He is sitting in the bedside couch.  He is alert, pleasant, conversant, and appropriate.  He is ambulating without difficulty.  Cranial nerves II through XII are grossly intact.    Lab Results   Component Value Date     2023       A/P:   Admit Diagnosis:   Seizure [R56.9]  Seizures [R56.9]     He certainly is going to need a biopsy of this mass.  The location of the mass is very problematic.  I discussed the rationale for needle biopsy versus an open biopsy/resection.  I think this will be very difficult to completely remove and a pterional, trans sylvian approach certainly carries the risk of MCA  stroke.  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  He is tentatively scheduled for needle biopsy on Tuesday.  Final recommendations will be left tomorrow.      Electronically signed by Gonzales Gibbons MD at 23 1110     Roxanne Iverson DO at 23 1036              [facility] Medicine Services  PROGRESS NOTE    Patient Name: Cheo Garrett  : 1975  MRN: [id number]    Date of Admission: 2023  Primary Care Physician: Provider, No Known    Subjective   Subjective     CC:  F/u seizures    HPI:  Patient up walking around his room,  his wife mentions that he had some auditory hallucinations overnight, otherwise he is doing well.    ROS:  Gen- No fevers, chills  CV- No chest pain, palpitations  Resp- No cough, dyspnea  GI- No N/V/D, abd pain    Objective   Objective     Vital Signs:   Temp:  [97.6 °F (36.4 °C)-98.3 °F (36.8 °C)] 98.2 °F (36.8 °C)  Heart Rate:  [79-97] 79  Resp:  [16-18] 18  BP: (125-143)/(75-95) 127/84     Physical Exam:  Constitutional: No acute distress, awake, alert, up walking around room  HENT: NCAT, mucous membranes moist  Respiratory:  respiratory effort normal   Cardiovascular: RRR, no murmurs, rubs, or gallops  Musculoskeletal: No bilateral ankle edema  Psychiatric: Appropriate affect, cooperative  Neurologic: Oriented x3, speech clear, no focal deficits currently  Skin: No rashes      Results Reviewed:  LAB RESULTS:      Lab 04/09/23  0640 04/08/23  0538 04/07/23  1826 04/07/23  1243 04/07/23  1053   WBC 9.66 9.94  --   --  18.22*   HEMOGLOBIN 15.3 17.2  --   --  16.6   HEMATOCRIT 43.9 47.4  --   --  46.4   PLATELETS 145 168  --   --  161   NEUTROS ABS 5.92 8.20*  --   --  15.73*   IMMATURE GRANS (ABS) 0.09* 0.04  --   --  0.10*   LYMPHS ABS 2.39 1.30  --   --  0.55*   MONOS ABS 1.15* 0.38  --   --  1.79*   EOS ABS 0.05 0.00  --   --  0.01   MCV 86.6 85.3  --   --  86.7   PROCALCITONIN  --   --   --   --  0.03   LACTATE  --   --  1.7 4.0* 6.4*   PROTIME  --   --   --   --  14.0         Lab 04/09/23  0640 04/08/23  0538 04/07/23  1053   SODIUM 144 140 140   POTASSIUM 3.7 4.0 3.1*   CHLORIDE 107 105 104   CO2 26.0 23.0 19.0*   ANION GAP 11.0 12.0 17.0*   BUN 16 13 12   CREATININE 0.80 0.82 1.08   EGFR 111.2 110.4 86.2   GLUCOSE 97 153* 151*   CALCIUM 9.3 9.4 8.8   MAGNESIUM  --   --  1.9   TSH  --   --  1.350         Lab 04/07/23  1053   TOTAL PROTEIN 6.5   ALBUMIN 4.2   GLOBULIN 2.3   ALT (SGPT) 27   AST (SGOT) 24   BILIRUBIN 0.5   ALK PHOS 48         Lab 04/07/23  1053   HSTROP T 9   PROTIME 14.0   INR 1.08                  Brief Urine Lab Results  (Last result in the past 365 days)      Color   Clarity   Blood   Leuk Est   Nitrite   Protein   CREAT   Urine HCG        04/07/23 1227 Yellow   Cloudy   Moderate (2+)   Negative   Negative   30 mg/dL (1+)                 Microbiology Results Abnormal     Procedure Component Value - Date/Time    Cryptococcal AG, CSF - Cerebrospinal Fluid, Lumbar Puncture [[id number]]  (Normal) Collected: 04/07/23 1304    Lab Status: Final result Specimen: Cerebrospinal Fluid from Lumbar Puncture Updated: 04/09/23 0734     Cryptococcal Antigen, CSF Negative    Culture, CSF - Cerebrospinal Fluid, Lumbar Puncture [[id number]] Collected: 04/07/23 1304    Lab Status: Preliminary result Specimen: Cerebrospinal Fluid from Lumbar Puncture Updated: 04/09/23 0718     CSF Culture No growth at 2 days     Gram Stain No WBCs or organisms seen    Blood Culture - Blood, Hand, Right [[id number]]  (Normal) Collected: 04/07/23 1243    Lab Status: Preliminary result Specimen: Blood from Hand, Right Updated: 04/08/23 1301     Blood Culture No growth at 24 hours    Blood Culture - Blood, Hand, Left [[id number]]  (Normal) Collected: 04/07/23 1243    Lab Status: Preliminary result Specimen: Blood from Hand, Left Updated: 04/08/23 1301     Blood Culture No growth at 24 hours    Meningitis / Encephalitis Panel, PCR - Cerebrospinal Fluid, Lumbar Puncture [[id number]]  (Normal) Collected: 04/07/23 1418    Lab Status: Final result Specimen: Cerebrospinal Fluid from Lumbar Puncture Updated: 04/07/23 1553     ESCHERICHIA COLI K1, PCR Not Detected     HAEMOPHILUS INFLUENZAE, PCR Not Detected     LISTERIA MONOCYTOGENES, PCR Not Detected     NEISSERIA MENINGITIDIS, PCR Not Detected     STREPTOCOCCUS AGALACTIAE, PCR Not Detected     STREPTOCOCCUS PNEUMONIAE, PCR Not Detected     CYTOMEGALOVIRUS (CMV), PCR Not Detected     ENTEROVIRUS, PCR Not Detected     HERPES SIMPLEX VIRUS 1 (HSV-1), PCR Not Detected     HERPES SIMPLEX VIRUS 2 (HSV-2),  PCR Not Detected     HUMAN PARECHOVIRUS, PCR Not Detected     VARICELLA ZOSTER VIRUS (VZV), PCR Not Detected     CRYPTOCOCCUS NEOFORMANS / GATTII, PCR Not Detected     HUMAN HERPES VIRUS 6 PCR Not Detected    Respiratory Panel PCR w/COVID-19(SARS-CoV-2) JESSY/TAMMY/ULISES/PAD/COR/MAD/WALESKA In-House, NP Swab in UTM/VTM, 3-4 HR TAT - Swab, Nasopharynx [[id number]]  (Normal) Collected: 04/07/23 1227    Lab Status: Final result Specimen: Swab from Nasopharynx Updated: 04/07/23 1349     ADENOVIRUS, PCR Not Detected     Coronavirus 229E Not Detected     Coronavirus HKU1 Not Detected     Coronavirus NL63 Not Detected     Coronavirus OC43 Not Detected     COVID19 Not Detected     Human Metapneumovirus Not Detected     Human Rhinovirus/Enterovirus Not Detected     Influenza A PCR Not Detected     Influenza B PCR Not Detected     Parainfluenza Virus 1 Not Detected     Parainfluenza Virus 2 Not Detected     Parainfluenza Virus 3 Not Detected     Parainfluenza Virus 4 Not Detected     RSV, PCR Not Detected     Bordetella pertussis pcr Not Detected     Bordetella parapertussis PCR Not Detected     Chlamydophila pneumoniae PCR Not Detected     Mycoplasma pneumo by PCR Not Detected    Narrative:      In the setting of a positive respiratory panel with a viral infection PLUS a negative procalcitonin without other underlying concern for bacterial infection, consider observing off antibiotics or discontinuation of antibiotics and continue supportive care. If the respiratory panel is positive for atypical bacterial infection (Bordetella pertussis, Chlamydophila pneumoniae, or Mycoplasma pneumoniae), consider antibiotic de-escalation to target atypical bacterial infection.          EEG    Result Date: 4/7/2023  Reason for referral: 47 y.o.male with seizure, altered mental status Technical Summary:  A 19 channel digital EEG was performed using the international 10-20 placement system, including eye leads and EKG leads. Duration: 22 minutes  Findings: The patient is awake.  The background shows diffuse medium amplitude 4-6 Hz intermixed delta and theta activity which is present symmetrically over both hemispheres.  A clear posterior rhythm is not seen.  EMG artifact is variably prominent anteriorly.  Photic stimulation does not change the background.  Hyperventilation is not performed.  Sleep is seen with slowing of the tracing and vertex waves and sleep spindles.  No focal features are seen.  No epileptiform activity is present.  No ongoing seizures are seen. Video: Available Technical quality: Superior EKG: Regular, 70 beats per SUMMARY: Mild-moderate generalized slow No focal features or epileptiform activity are seen     Impression: Diffuse cerebral dysfunction of mild degree but nonspecific No ongoing seizures are seen This report is transcribed using the Dragon dictation system.      CT Abdomen Pelvis With & Without Contrast    Result Date: 4/8/2023  CT CHEST W WO CONTRAST DIAGNOSTIC, CT ABDOMEN PELVIS W WO CONTRAST Date of Exam: 4/8/2023 7:08 PM EDT Indication: Brain mass, evaluate for malignancy, metastatic workup Comparison: CT head without contrast, MRI brain 4/7/2023 Technique: Axial CT images were obtained of the chest , abdomen, and pelvis before and after the uneventful intravenous administration of 100 mL Isovue 300.  Reconstructed coronal and sagittal images were also obtained. Automated exposure control and iterative construction methods were used. Findings: Chest: Thoracic aortic arch branch vasculature is patent. Heart size is normal. Negative for pericardial effusion. No pulmonary embolus. Negative for mediastinal, hilar, or axillary adenopathy. Bilateral gynecomastia noted. Trachea and mainstem bronchi are patent. Lungs without consolidation. No pneumothorax. Mild linear atelectasis at the left lower lobe. No suspicious pulmonary nodule or mass. Thoracic vertebral bodies are maintained in height. Negative for aggressive osseous  lesion or fracture. Mild wall thickening of distal esophagus which may relate to reflux or esophagitis. Abdomen and pelvis: Initial noncontrast imaging demonstrates no radiodense renal calculus. No ureteral calculus. Subsequently post contrast images were obtained. Post surgical changes of prior hepatic transplant. No liver lesion. The spleen is normal in size. Adrenal glands  are normal. Pancreas without evidence of pancreatitis. Is no pancreatic mass. Gallbladder absent. No biliary dilatation. Kidneys are symmetrically enhancing. There is no enhancing or suspicious renal mass. No hydronephrosis or hydroureter. Urinary bladder is thin-walled. Prostate normal in size. Delayed images demonstrate normal contrast excretion into the nondilated ureters. Negative for pneumoperitoneum. No bowel obstruction. No fluid collection. The appendix is normal. Fluid-filled distention of the stomach. The portal vein and hepatic veins are patent. The splenic vein and superior mesenteric vein are patent. There are prominent venous collaterals in the left upper quadrant extending into the left mid and lower abdomen adjacent to the infrarenal aorta which may relate to sequela of prior portal hypertension given history of liver transplant. Negative for central mesenteric or retroperitoneal adenopathy. No aggressive osseous lesion or acute fracture. Disc narrowing lower lumbar spine at L5-S1.     Impression: Impression: 1. No findings of primary malignancy or metastatic adenopathy in the chest, abdomen, or pelvis. 2. Post surgical changes of liver transplant. 3. Wall thickening at mid and distal esophagus, correlate for findings of reflux/esophagitis. 4. Additional incidental chronic findings above. Electronically Signed: Juan Chandler  4/8/2023 8:52 PM EDT  Workstation ID: [id number]    CT Head Without Contrast    Result Date: 4/7/2023  CT HEAD WO CONTRAST Date of Exam: 4/7/2023 11:50 AM EDT Indication: new onset sz. Comparison: None  available. Technique: Axial CT images were obtained of the head without contrast administration.  Reconstructed coronal and sagittal images were also obtained. Automated exposure control and iterative construction methods were used. Findings: Faint hypodensity is seen within the left basal ganglia, likely either chronic lacunar infarct or prominent perivascular space. There is otherwise no evidence of intracranial hemorrhage, mass or mass effect. The ventricles are normal in size and configuration. The orbits are normal. The paranasal sinuses are grossly clear. The calvarium is intact.     Impression: Impression: Faint hypodensity is seen within the left basal ganglia, likely either chronic lacunar infarct or prominent perivascular space. No acute intracranial abnormality otherwise. Electronically Signed: Christiano Nino  4/7/2023 12:15 PM EDT  Workstation ID: [id number]    CT Chest With & Without Contrast Diagnostic    Result Date: 4/8/2023  CT CHEST W WO CONTRAST DIAGNOSTIC, CT ABDOMEN PELVIS W WO CONTRAST Date of Exam: 4/8/2023 7:08 PM EDT Indication: Brain mass, evaluate for malignancy, metastatic workup Comparison: CT head without contrast, MRI brain 4/7/2023 Technique: Axial CT images were obtained of the chest , abdomen, and pelvis before and after the uneventful intravenous administration of 100 mL Isovue 300.  Reconstructed coronal and sagittal images were also obtained. Automated exposure control and iterative construction methods were used. Findings: Chest: Thoracic aortic arch branch vasculature is patent. Heart size is normal. Negative for pericardial effusion. No pulmonary embolus. Negative for mediastinal, hilar, or axillary adenopathy. Bilateral gynecomastia noted. Trachea and mainstem bronchi are patent. Lungs without consolidation. No pneumothorax. Mild linear atelectasis at the left lower lobe. No suspicious pulmonary nodule or mass. Thoracic vertebral bodies are maintained in height. Negative for  aggressive osseous lesion or fracture. Mild wall thickening of distal esophagus which may relate to reflux or esophagitis. Abdomen and pelvis: Initial noncontrast imaging demonstrates no radiodense renal calculus. No ureteral calculus. Subsequently post contrast images were obtained. Post surgical changes of prior hepatic transplant. No liver lesion. The spleen is normal in size. Adrenal glands  are normal. Pancreas without evidence of pancreatitis. Is no pancreatic mass. Gallbladder absent. No biliary dilatation. Kidneys are symmetrically enhancing. There is no enhancing or suspicious renal mass. No hydronephrosis or hydroureter. Urinary bladder is thin-walled. Prostate normal in size. Delayed images demonstrate normal contrast excretion into the nondilated ureters. Negative for pneumoperitoneum. No bowel obstruction. No fluid collection. The appendix is normal. Fluid-filled distention of the stomach. The portal vein and hepatic veins are patent. The splenic vein and superior mesenteric vein are patent. There are prominent venous collaterals in the left upper quadrant extending into the left mid and lower abdomen adjacent to the infrarenal aorta which may relate to sequela of prior portal hypertension given history of liver transplant. Negative for central mesenteric or retroperitoneal adenopathy. No aggressive osseous lesion or acute fracture. Disc narrowing lower lumbar spine at L5-S1.     Impression: Impression: 1. No findings of primary malignancy or metastatic adenopathy in the chest, abdomen, or pelvis. 2. Post surgical changes of liver transplant. 3. Wall thickening at mid and distal esophagus, correlate for findings of reflux/esophagitis. 4. Additional incidental chronic findings above. Electronically Signed: Juan Chandler  4/8/2023 8:52 PM EDT  Workstation ID: [id number]    MRI Brain With & Without Contrast    Result Date: 4/7/2023  MRI BRAIN W WO CONTRAST Date of Exam: 4/7/2023 3:30 PM EDT Indication: new  onset sz protocol.  Comparison: CT earlier the same day Technique:  Routine multiplanar/multisequence sequence images of the brain were obtained before and after the uneventful administration of  20 mL Multihance. Findings: Corresponding to the area of apparent low density within the left basal ganglia seen on same-day CT, there is a somewhat irregular, partially cystic and avidly enhancing mass measuring 2.2 x 2.0 cm. There is either a separate nodule or small satellite nodule present posteriorly measuring 8 mm, also enhancing. Moderate surrounding edema is present on FLAIR sequences, extending to involve the left medial temporal lobe. No additional mass or abnormal enhancement is present. The ventricles are normal in size and configuration. No acute infarct is present on diffusion weighted sequences. The orbits are normal. The paranasal sinuses are grossly clear.     Impression: Impression: 2.2 cm enhancing and partially cystic mass of the left basal ganglia as above, with surrounding edema extending towards the insular and left temporal regions, favoring primary glial neoplasm over metastasis. Recommend neurosurgical consultation. Electronically Signed: Christiano Nino  4/7/2023 4:32 PM EDT  Workstation ID: [id number]    XR Chest 1 View    Result Date: 4/7/2023  XR CHEST 1 VW Date of Exam: 4/7/2023 10:53 AM EDT Indication: Weak/Dizzy/AMS triage protocol. Comparison: None available. FINDINGS: No focal airspace opacity. No pleural effusion or pneumothorax. Normal heart and mediastinal contours.     Impression: No evidence of acute disease in the chest. Electronically Signed: Christiano Nino  4/7/2023 11:31 AM EDT  Workstation ID: [id number]          Current medications:  Scheduled Meds:levETIRAcetam, 1,000 mg, Oral, Q12H  sodium chloride, 10 mL, Intravenous, Q12H  tacrolimus, 1.5 mg, Oral, Q12H      Continuous Infusions:   PRN Meds:.•  acetaminophen  •  Calcium Replacement - Follow Nurse / BPA Driven Protocol  •   LORazepam  •  LORazepam  •  Magnesium Standard Dose Replacement - Follow Nurse / BPA Driven Protocol  •  ondansetron  •  Phosphorus Replacement - Follow Nurse / BPA Driven Protocol  •  Potassium Replacement - Follow Nurse / BPA Driven Protocol  •  sodium chloride  •  sodium chloride  •  sodium chloride    Assessment & Plan   Assessment & Plan     Active Hospital Problems    Diagnosis  POA   • **Seizures [R56.9]  Yes   • Liver transplanted [Z94.4]  Not Applicable   • Immunosuppressed status [D84.9]  Yes      Resolved Hospital Problems   No resolved problems to display.        Brief Hospital Course to date:  Cheo Garrett is a 47 y.o. male with PMHx significant for liver cancer s/p Liver transplant at [facility] (2013) on chronic immunosuppressant therapy who presented today via EMS after wife found him having a seizure at home this morning.     Brain Mass  New Onset Seizures  Confusion  - MRI brain with 2.2 cm enhancing and partially cystic mass of the left basal ganglia with surrounding edema extending towards the insular and left temporal regions, favoring primary glial neoplasm   - d/w Dr. Garcia yesterday, continue keppra BID  - appreciate Neurosurgery input, recommend biopsy, tentatively planned for Tuesday  - CT chest/abd/pelvis with no findings concerning for primary malignancy  - ID consult per NSGY recommendations, infectious work-up ongoing  - seizure precautions, ativan PRN  - SLP evaluation, PT/OT following     Lactic Acidosis:  - likely secondary to seizures, now resolved     Hx of Liver Transplant   Hx of Liver Cancer (data deficient)  Immunosuppressive Therapy  - stable and follows with [facility] yearly  - Tacrolimus level pending  - liver enzymes within normal limits     Leukocytosis - resolved  - UA negative, CXR negative, CSF studies negative  - possibly reactive in setting of no fever, normal procal  - blood cultures are negative    Expected Discharge Location and Transportation: Home  Expected  "Discharge   3-4 days    DVT prophylaxis:  Mechanical DVT prophylaxis orders are present.     AM-PAC 6 Clicks Score (PT): 20 (23 0800)    CODE STATUS:   Code Status and Medical Interventions:   Ordered at: 23 1354     Level Of Support Discussed With:    Patient     Code Status (Patient has no pulse and is not breathing):    CPR (Attempt to Resuscitate)     Medical Interventions (Patient has pulse or is breathing):    Full Support       Roxanne Iverson DO  23        Electronically signed by Roxanne Iverson DO at 23 [id number]     Alyssa Galloway MD at 23 2101          DOS: 2023  NAME: Cheo Garrett   : 1975  PCP: Provider, No Known  Chief Complaint   Patient presents with   • Altered Mental Status       Chief complaint: He is currently lot more clear and able to talk well.  Subjective: Denies any headaches and he is able to follow one-step two-step and three-step commands without any problems.  He was fit enough to jump out of the bed but he is slightly off balance when I was doing the Romberg testing.  He was able to ambulate the entire room without any issues.    Objective:  Vital signs: /91 (BP Location: Left arm, Patient Position: Sitting)   Pulse 91   Temp 97.6 °F (36.4 °C) (Oral)   Resp 18   Ht 193 cm (75.98\")   Wt 109 kg (239 lb 12.8 oz)   SpO2 93%   BMI 29.20 kg/m²    Gen: NAD, vitals reviewed  MS: Normal.  CN: Cranials 2-12: No focal deficits noted.  Motor: Muscles of both upper and lower extremities show good bulk power and tone.  Sensory: No sensory loss noted.  DTRs: 2+ bilaterally with downgoing toes.  Coordination: Normal finger-to-nose coordination noted.  Gait: He was able to get out of the bed and ambulate independently without problems.  The Romberg was positive and he was tending to fall to the left backwards.    ROS:  General: Pleasant gentleman currently feels better.  Neurological: Positive Romberg noted but otherwise no focal deficits " noted.    Laboratory results:  Lab Results   Component Value Date    GLUCOSE 153 (H) 04/08/2023    CALCIUM 9.4 04/08/2023     04/08/2023    K 4.0 04/08/2023    CO2 23.0 04/08/2023     04/08/2023    BUN 13 04/08/2023    CREATININE 0.82 04/08/2023    BCR 15.9 04/08/2023    ANIONGAP 12.0 04/08/2023     Lab Results   Component Value Date    WBC 9.94 04/08/2023    HGB 17.2 04/08/2023    HCT 47.4 04/08/2023    MCV 85.3 04/08/2023     04/08/2023            Review of labs: The fasting glucose was elevated 153 because of the effects of dexamethasone.  The CSF studies were as follows:     Latest Reference Range & Units Most Recent   Supernatant Color, CSF Colorless  Colorless  4/7/23 13:04   Appearance, CSF Clear  Clear  4/7/23 13:04   Color, CSF Colorless  Colorless  4/7/23 13:04   Tube Number, CSF  1  4/7/23 13:04   Volume, CSF mL 8.0  4/7/23 13:04   WBC, CSF 0 - 5 /mm3 0  4/7/23 13:04   RBC, CSF 0 - 5 /mm3 18 (H)  4/7/23 13:04   Glucose, CSF 40 - 70 mg/dL 88 (H)  4/7/23 13:04   Protein, Total (CSF) 15.0 - 45.0 mg/dL 30.7  4/7/23 13:04   (H): Data is abnormally high  ESCHERICHIA COLI K1, PCR  Not Detected Not Detected    HAEMOPHILUS INFLUENZAE, PCR  Not Detected Not Detected    LISTERIA MONOCYTOGENES, PCR  Not Detected Not Detected    NEISSERIA MENINGITIDIS, PCR  Not Detected Not Detected    STREPTOCOCCUS AGALACTIAE, PCR  Not Detected Not Detected    STREPTOCOCCUS PNEUMONIAE, PCR  Not Detected Not Detected    CYTOMEGALOVIRUS (CMV), PCR  Not Detected Not Detected    ENTEROVIRUS, PCR  Not Detected Not Detected    HERPES SIMPLEX VIRUS 1 (HSV-1), PCR  Not Detected Not Detected    HERPES SIMPLEX VIRUS 2 (HSV-2), PCR  Not Detected Not Detected    HUMAN PARECHOVIRUS, PCR  Not Detected Not Detected    VARICELLA ZOSTER VIRUS (VZV), PCR  Not Detected Not Detected    CRYPTOCOCCUS NEOFORMANS / GATTII, PCR  Not Detected Not Detected    HUMAN HERPES VIRUS 6 PCR  Not Detected Not Detected        Review and  interpretation of imaging: The MRI of the brain with and without contrast performed on April 7, 2023 showed the following:    Findings:   Corresponding to the area of apparent low density within the left basal ganglia seen on same-day CT, there is a somewhat irregular, partially cystic and avidly enhancing mass measuring 2.2 x 2.0 cm. There is either a separate nodule or small satellite   nodule present posteriorly measuring 8 mm, also enhancing. Moderate surrounding edema is present on FLAIR sequences, extending to involve the left medial temporal lobe. No additional mass or abnormal enhancement is present. The ventricles are normal in   size and configuration. No acute infarct is present on diffusion weighted sequences. The orbits are normal. The paranasal sinuses are grossly clear.     IMPRESSION:  Impression:   2.2 cm enhancing and partially cystic mass of the left basal ganglia as above, with surrounding edema extending towards the insular and left temporal regions, favoring primary glial neoplasm over metastasis. Recommend neurosurgical consultation    Workup to date: The EEG performed showed the following:    Findings:     The patient is awake.  The background shows diffuse medium amplitude 4-6 Hz intermixed delta and theta activity which is present symmetrically over both hemispheres.  A clear posterior rhythm is not seen.  EMG artifact is variably prominent anteriorly.  Photic stimulation does not change the background.  Hyperventilation is not performed.  Sleep is seen with slowing of the tracing and vertex waves and sleep spindles.  No focal features are seen.  No epileptiform activity is present.  No ongoing seizures are seen.     Video: Available     Technical quality: Superior     EKG: Regular, 70 beats per     SUMMARY:     Mild-moderate generalized slow     No focal features or epileptiform activity are seen     IMPRESSION:     Diffuse cerebral dysfunction of mild degree but nonspecific     No ongoing  seizures are seen    Diagnoses: Patient with most probably a malignant lesion in the left basal ganglia protruding into the left temporoparietal area of the brain which could have been the seizure focus.      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    Plan:  1.  CT scan of the chest abdomen pelvis is currently pending to look for any underlying malignancy or a nidus of infection.  2.  Infectious disease has been consulted for the time being.  3.  Patient has been set up for a biopsy for coming Tuesday.  4.  Continue the Keppra at 1000 mg twice daily with food.  5.  He has been instructed not to drive or use any hazardous equipment for 6 months as per the [facility].    Discussed with with the patient and his wife and will follow-up.    Spent a total of 30 minutes in face-to-face evaluation and management of the patient.    Electronically signed by Alyssa Galloway MD, 23, 5:43 PM EDT.      Electronically signed by Alyssa Galloway MD at 23 1751     Roxanne Iverson DO at 23 1153              [facility] Medicine Services  PROGRESS NOTE    Patient Name: Cheo Garrett  : 1975  MRN: [id number]    Date of Admission: 2023  Primary Care Physician: Provider, No Known    Subjective   Subjective     CC:  F/u seizures    HPI:  Patient resting in bedside chair, doing okay, still with some forgetful moments, no further seizures thus far. Wife supportive and at bedside. We discussed the results of the scan.    ROS:  Gen- No fevers, chills  CV- No chest pain, palpitations  Resp- No cough, dyspnea  GI- No N/V/D, abd pain    Objective   Objective     Vital Signs:   Temp:  [97.7 °F (36.5 °C)-98.4 °F (36.9 °C)] 97.7 °F (36.5 °C)  Heart Rate:  [67-97] 83  Resp:  [18] 18  BP: (126-142)/(73-92) 134/91     Physical Exam:  Constitutional: No acute distress, awake, alert  HENT: NCAT, mucous membranes  moist  Respiratory: Clear to auscultation bilaterally, respiratory effort normal   Cardiovascular: RRR, no murmurs, rubs, or gallops  Gastrointestinal: Positive bowel sounds, soft, nontender, nondistended  Musculoskeletal: No bilateral ankle edema  Psychiatric: Appropriate affect, cooperative  Neurologic: Oriented x3, speech clear, no focal deficits currently  Skin: No rashes      Results Reviewed:  LAB RESULTS:      Lab 04/08/23  0538 04/07/23  1826 04/07/23  1243 04/07/23  1053   WBC 9.94  --   --  18.22*   HEMOGLOBIN 17.2  --   --  16.6   HEMATOCRIT 47.4  --   --  46.4   PLATELETS 168  --   --  161   NEUTROS ABS 8.20*  --   --  15.73*   IMMATURE GRANS (ABS) 0.04  --   --  0.10*   LYMPHS ABS 1.30  --   --  0.55*   MONOS ABS 0.38  --   --  1.79*   EOS ABS 0.00  --   --  0.01   MCV 85.3  --   --  86.7   PROCALCITONIN  --   --   --  0.03   LACTATE  --  1.7 4.0* 6.4*   PROTIME  --   --   --  14.0         Lab 04/08/23  0538 04/07/23  1053   SODIUM 140 140   POTASSIUM 4.0 3.1*   CHLORIDE 105 104   CO2 23.0 19.0*   ANION GAP 12.0 17.0*   BUN 13 12   CREATININE 0.82 1.08   EGFR 110.4 86.2   GLUCOSE 153* 151*   CALCIUM 9.4 8.8   MAGNESIUM  --  1.9   TSH  --  1.350         Lab 04/07/23  1053   TOTAL PROTEIN 6.5   ALBUMIN 4.2   GLOBULIN 2.3   ALT (SGPT) 27   AST (SGOT) 24   BILIRUBIN 0.5   ALK PHOS 48         Lab 04/07/23  1053   HSTROP T 9   PROTIME 14.0   INR 1.08                 Brief Urine Lab Results  (Last result in the past 365 days)      Color   Clarity   Blood   Leuk Est   Nitrite   Protein   CREAT   Urine HCG        04/07/23 1227 Yellow   Cloudy   Moderate (2+)   Negative   Negative   30 mg/dL (1+)                 Microbiology Results Abnormal     Procedure Component Value - Date/Time    Culture, CSF - Cerebrospinal Fluid, Lumbar Puncture [[id number]] Collected: 04/07/23 1304    Lab Status: Preliminary result Specimen: Cerebrospinal Fluid from Lumbar Puncture Updated: 04/08/23 0703     CSF Culture No growth     Gram  Stain No WBCs or organisms seen    Meningitis / Encephalitis Panel, PCR - Cerebrospinal Fluid, Lumbar Puncture [[id number]]  (Normal) Collected: 04/07/23 1418    Lab Status: Final result Specimen: Cerebrospinal Fluid from Lumbar Puncture Updated: 04/07/23 1553     ESCHERICHIA COLI K1, PCR Not Detected     HAEMOPHILUS INFLUENZAE, PCR Not Detected     LISTERIA MONOCYTOGENES, PCR Not Detected     NEISSERIA MENINGITIDIS, PCR Not Detected     STREPTOCOCCUS AGALACTIAE, PCR Not Detected     STREPTOCOCCUS PNEUMONIAE, PCR Not Detected     CYTOMEGALOVIRUS (CMV), PCR Not Detected     ENTEROVIRUS, PCR Not Detected     HERPES SIMPLEX VIRUS 1 (HSV-1), PCR Not Detected     HERPES SIMPLEX VIRUS 2 (HSV-2), PCR Not Detected     HUMAN PARECHOVIRUS, PCR Not Detected     VARICELLA ZOSTER VIRUS (VZV), PCR Not Detected     CRYPTOCOCCUS NEOFORMANS / GATTII, PCR Not Detected     HUMAN HERPES VIRUS 6 PCR Not Detected    Respiratory Panel PCR w/COVID-19(SARS-CoV-2) JESSY/TAMMY/ULISES/PAD/COR/MAD/WALESKA In-House, NP Swab in UTM/VTM, 3-4 HR TAT - Swab, Nasopharynx [[id number]]  (Normal) Collected: 04/07/23 1227    Lab Status: Final result Specimen: Swab from Nasopharynx Updated: 04/07/23 1349     ADENOVIRUS, PCR Not Detected     Coronavirus 229E Not Detected     Coronavirus HKU1 Not Detected     Coronavirus NL63 Not Detected     Coronavirus OC43 Not Detected     COVID19 Not Detected     Human Metapneumovirus Not Detected     Human Rhinovirus/Enterovirus Not Detected     Influenza A PCR Not Detected     Influenza B PCR Not Detected     Parainfluenza Virus 1 Not Detected     Parainfluenza Virus 2 Not Detected     Parainfluenza Virus 3 Not Detected     Parainfluenza Virus 4 Not Detected     RSV, PCR Not Detected     Bordetella pertussis pcr Not Detected     Bordetella parapertussis PCR Not Detected     Chlamydophila pneumoniae PCR Not Detected     Mycoplasma pneumo by PCR Not Detected    Narrative:      In the setting of a positive respiratory panel with a  viral infection PLUS a negative procalcitonin without other underlying concern for bacterial infection, consider observing off antibiotics or discontinuation of antibiotics and continue supportive care. If the respiratory panel is positive for atypical bacterial infection (Bordetella pertussis, Chlamydophila pneumoniae, or Mycoplasma pneumoniae), consider antibiotic de-escalation to target atypical bacterial infection.          EEG    Result Date: 4/7/2023  Reason for referral: 47 y.o.male with seizure, altered mental status Technical Summary:  A 19 channel digital EEG was performed using the international 10-20 placement system, including eye leads and EKG leads. Duration: 22 minutes Findings: The patient is awake.  The background shows diffuse medium amplitude 4-6 Hz intermixed delta and theta activity which is present symmetrically over both hemispheres.  A clear posterior rhythm is not seen.  EMG artifact is variably prominent anteriorly.  Photic stimulation does not change the background.  Hyperventilation is not performed.  Sleep is seen with slowing of the tracing and vertex waves and sleep spindles.  No focal features are seen.  No epileptiform activity is present.  No ongoing seizures are seen. Video: Available Technical quality: Superior EKG: Regular, 70 beats per SUMMARY: Mild-moderate generalized slow No focal features or epileptiform activity are seen     Impression: Diffuse cerebral dysfunction of mild degree but nonspecific No ongoing seizures are seen This report is transcribed using the Dragon dictation system.      CT Head Without Contrast    Result Date: 4/7/2023  CT HEAD WO CONTRAST Date of Exam: 4/7/2023 11:50 AM EDT Indication: new onset sz. Comparison: None available. Technique: Axial CT images were obtained of the head without contrast administration.  Reconstructed coronal and sagittal images were also obtained. Automated exposure control and iterative construction methods were used.  Findings: Faint hypodensity is seen within the left basal ganglia, likely either chronic lacunar infarct or prominent perivascular space. There is otherwise no evidence of intracranial hemorrhage, mass or mass effect. The ventricles are normal in size and configuration. The orbits are normal. The paranasal sinuses are grossly clear. The calvarium is intact.     Impression: Impression: Faint hypodensity is seen within the left basal ganglia, likely either chronic lacunar infarct or prominent perivascular space. No acute intracranial abnormality otherwise. Electronically Signed: Christiano Nino  4/7/2023 12:15 PM EDT  Workstation ID: [id number]    MRI Brain With & Without Contrast    Result Date: 4/7/2023  MRI BRAIN W WO CONTRAST Date of Exam: 4/7/2023 3:30 PM EDT Indication: new onset sz protocol.  Comparison: CT earlier the same day Technique:  Routine multiplanar/multisequence sequence images of the brain were obtained before and after the uneventful administration of  20 mL Multihance. Findings: Corresponding to the area of apparent low density within the left basal ganglia seen on same-day CT, there is a somewhat irregular, partially cystic and avidly enhancing mass measuring 2.2 x 2.0 cm. There is either a separate nodule or small satellite nodule present posteriorly measuring 8 mm, also enhancing. Moderate surrounding edema is present on FLAIR sequences, extending to involve the left medial temporal lobe. No additional mass or abnormal enhancement is present. The ventricles are normal in size and configuration. No acute infarct is present on diffusion weighted sequences. The orbits are normal. The paranasal sinuses are grossly clear.     Impression: Impression: 2.2 cm enhancing and partially cystic mass of the left basal ganglia as above, with surrounding edema extending towards the insular and left temporal regions, favoring primary glial neoplasm over metastasis. Recommend neurosurgical consultation.  Electronically Signed: Christiano Nino  4/7/2023 4:32 PM EDT  Workstation ID: [id number]    XR Chest 1 View    Result Date: 4/7/2023  XR CHEST 1 VW Date of Exam: 4/7/2023 10:53 AM EDT Indication: Weak/Dizzy/AMS triage protocol. Comparison: None available. FINDINGS: No focal airspace opacity. No pleural effusion or pneumothorax. Normal heart and mediastinal contours.     Impression: No evidence of acute disease in the chest. Electronically Signed: Christiano Nino  4/7/2023 11:31 AM EDT  Workstation ID: [id number]          Current medications:  Scheduled Meds:levETIRAcetam, 1,000 mg, Oral, Q12H  sodium chloride, 10 mL, Intravenous, Q12H  tacrolimus, 1.5 mg, Oral, Q12H      Continuous Infusions:   PRN Meds:.•  acetaminophen  •  Calcium Replacement - Follow Nurse / BPA Driven Protocol  •  LORazepam  •  LORazepam  •  Magnesium Standard Dose Replacement - Follow Nurse / BPA Driven Protocol  •  ondansetron  •  Phosphorus Replacement - Follow Nurse / BPA Driven Protocol  •  Potassium Replacement - Follow Nurse / BPA Driven Protocol  •  sodium chloride  •  sodium chloride  •  sodium chloride    Assessment & Plan   Assessment & Plan     Active Hospital Problems    Diagnosis  POA   • **Seizures [R56.9]  Yes   • Liver transplanted [Z94.4]  Not Applicable   • Immunosuppressed status [D84.9]  Yes      Resolved Hospital Problems   No resolved problems to display.        Brief Hospital Course to date:  Cheo Garrett is a 47 y.o. male with PMHx significant for liver cancer s/p Liver transplant at [facility] (2013) on chronic immunosuppressant therapy who presented today via EMS after wife found him having a seizure at home this morning.     Brain Mass  New Onset Seizures  Confusion  - MRI brain with 2.2 cm enhancing and partially cystic mass of the left basal ganglia with surrounding edema extending towards the insular and left temporal regions, favoring primary glial neoplasm   - d/w Dr. Garcia yesterday, continue keppra BID  - appreciate  Neurosurgery input, recommend biopsy. Wife/patient not from [place] and may desire to pursue any type of operative intervention closer to home. Okay with discontinuing steroids  - CT chest/abd/pelvis  - ID consult per NSGY recommendations to rule out infectious etiology, although CSF work-up thus far appear benign  - seizure precautions, ativan PRN  - SLP evaluation, PT/OT     Lactic Acidosis:  - likely secondary to seizures, now resolved     Hx of Liver Transplant   Hx of Liver Cancer (data deficient)  Immunosuppressive Therapy  - stable and follows with [facility] yearly  - Tacrolimus level pending  - liver enzymes within normal limits     Leukocytosis - resolved  - UA negative, CXR negative, CSF studies negative  - presume reactive in setting of no fever, normal procal  - blood cultures pending    Expected Discharge Location and Transportation: Home  Expected Discharge   pending further work-up     DVT prophylaxis:  Mechanical DVT prophylaxis orders are present.     AM-PAC 6 Clicks Score (PT): 20 (04/08/23 0730)    CODE STATUS:   Code Status and Medical Interventions:   Ordered at: 04/07/23 1354     Level Of Support Discussed With:    Patient     Code Status (Patient has no pulse and is not breathing):    CPR (Attempt to Resuscitate)     Medical Interventions (Patient has pulse or is breathing):    Full Support       Roxanne Iverson DO  04/08/23        Electronically signed by Roxanne Iverson DO at 04/08/23 1158          Consult Notes (all)      Duncan Pradhan MD at 04/08/23 1515      Consult Orders    1. Inpatient Infectious Diseases Consult [[id number]] ordered by Roxanne Iverson DO at 04/08/23 1138                   INFECTIOUS DISEASE CONSULT/INITIAL HOSPITAL VISIT    Cheo Garrett  1975  [id number]    Date of Consult: 4/8/2023    Admission Date: 4/7/2023      Requesting Provider: Roxanne Iverson DO  Evaluating Physician: Duncan Pradhan MD    Reason for Consultation: immunocompromised, brain  lesion    History of present illness:    Patient is a 47 y.o. male with h/o liver cancer and liver transplantation 2013 at [facility]/on Tacrolimus who we were asked to see for possible infectious brain lesion.  The patient was brought to [facility] ED on 4/7 by EMS after his wife witness the patient having a seizure at her family's home. They are from [place], but are visiting family near [place], [place].   The patient has no h/o seizures and has been relatively healthy since his liver transplantation.   He states he does no remember leading up to his being taken to [facility].  On arrival, the patient is afebrile.  Admitting labs are WBC 18,200 with 86% neutrophils, lactic acid 6.4, PCT 0.03, Creatinine 1.08, ammonia 49, and UA WBC 0-2.  A UDS was negative.  A respiratory panel PCR was negative.  Blood cultures are negative to date. A CXR showed no acute findings.  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  He underwent an LP yesterday with WBC CSF zero, glucose 88, and protein 30.7. A Meningitis/encephalitis panel PCR was negative.  A toxoplasma PCR from CSF is pending.  A CSF culture is negative to date. He is currently on no antibiotics. ID was asked to evaluate.  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     Past Medical History:   Diagnosis Date   • Liver cancer        History reviewed. No pertinent surgical history.   Liver transplantation 2013    History reviewed. No pertinent family history.    Social History     Socioeconomic History   • Marital status:    Denies tobacco, alcohol, or illicit drug use.   Lives in    No Known Allergies      Medication:    Current Facility-Administered Medications:   •  acetaminophen (TYLENOL) tablet 650 mg, 650 mg, Oral, Q4H PRN, Roxanne Iverson, DO  •  Calcium Replacement - Follow Nurse / BPA Driven Protocol, , Does not apply, PRN, Zayra,  Roxanne ARCHER, DO  •  levETIRAcetam (KEPPRA) tablet 1,000 mg, 1,000 mg, Oral, Q12H, Alyssa Galloway MD, 1,000 mg at 04/08/23 0842  •  LORazepam (ATIVAN) injection 1 mg, 1 mg, Intravenous, Q2H PRN, Roxanne Iverson, DO  •  LORazepam (ATIVAN) injection 2 mg, 2 mg, Intravenous, Q5 Min PRN, Alyssa Galloway MD  •  Magnesium Standard Dose Replacement - Follow Nurse / BPA Driven Protocol, , Does not apply, PRN, Roxanne Iverson, DO  •  ondansetron (ZOFRAN) injection 4 mg, 4 mg, Intravenous, Q6H PRN, Roxanne Iverson, DO  •  Phosphorus Replacement - Follow Nurse / BPA Driven Protocol, , Does not apply, PRN, Roxanne Iverson, DO  •  Potassium Replacement - Follow Nurse / BPA Driven Protocol, , Does not apply, PRN, Roxanne Iverson, DO  •  sodium chloride 0.9 % flush 10 mL, 10 mL, Intravenous, PRN, Roxanne Iverson, DO, 10 mL at 04/07/23 1913  •  sodium chloride 0.9 % flush 10 mL, 10 mL, Intravenous, Q12H, Roxanne Iverson DO, 10 mL at 04/08/23 0843  •  sodium chloride 0.9 % flush 10 mL, 10 mL, Intravenous, PRN, Roxanne Iverson, DO  •  sodium chloride 0.9 % infusion 40 mL, 40 mL, Intravenous, PRN, Roxanne Iverson, DO  •  tacrolimus (PROGRAF) capsule 1.5 mg, 1.5 mg, Oral, Q12H, Roxanne Iverson, DO, 1.5 mg at 04/08/23 0842    Antibiotics:  Anti-Infectives (From admission, onward)    None            Review of Systems:  Constitutional-- No Fever, chills or sweats.  Appetite good, and no malaise. No fatigue.  HEENT-- No new vision, hearing or throat complaints.  No epistaxis or oral sores.  Denies odynophagia or dysphagia. No headache, photophobia or neck stiffness.  CV-- No chest pain, palpitation or syncope  Resp-- No SOB/cough/Hemoptysis  GI- No nausea, vomiting, or diarrhea.  No hematochezia, melena, or hematemesis. Denies jaundice or chronic liver disease.  -- No dysuria, hematuria, or flank pain.  Denies hesitancy, urgency or burning with urination.   Lymph- no swollen lymph nodes in neck/axilla or  groin.   Heme- No active bruising or bleeding; no Hx of DVT or PE.  MS-- no swelling or pain in the bones or joints of arms/legs.  No new back pain.  Neuro-- No acute focal weakness or numbness in the arms or legs.   Skin--No rashes or lesions      Physical Exam:   Vital Signs  Temp (24hrs), Av.9 °F (36.6 °C), Min:97.7 °F (36.5 °C), Max:98 °F (36.7 °C)    Temp  Min: 97.7 °F (36.5 °C)  Max: 98 °F (36.7 °C)  BP  Min: 128/73  Max: 140/92  Pulse  Min: 67  Max: 97  Resp  Min: 18  Max: 18  SpO2  Min: 93 %  Max: 97 %    GENERAL: Awake and alert, in no acute distress.   HEENT: Normocephalic, atraumatic.  PERRL. EOMI. No conjunctival injection. No icterus. Oropharynx clear without evidence of thrush or exudate.  NECK: Supple without nuchal rigidity. No mass.  LYMPH: No cervical, axillary or inguinal lymphadenopathy.  HEART: RRR; No murmur, rubs, gallops.   LUNGS: Clear to auscultation bilaterally without wheezing, rales, rhonchi. Normal respiratory effort. Nonlabored.  ABDOMEN: Soft, nontender, nondistended. Positive bowel sounds. No rebound or guarding. NO mass or HSM.  EXT:  No cyanosis, clubbing or edema. No cord.  :  Without Bailey catheter.  MSK: No joint effusions or erythema  SKIN: Warm and dry without cutaneous eruptions on Inspection/palpation.    NEURO: Oriented to PPT.  Motor 5/5 strength  PSYCHIATRIC: Normal insight and judgment. Cooperative with PE    Laboratory Data    Results from last 7 days   Lab Units 23  0538 23  1053   WBC 10*3/mm3 9.94 18.22*   HEMOGLOBIN g/dL 17.2 16.6   HEMATOCRIT % 47.4 46.4   PLATELETS 10*3/mm3 168 161     Results from last 7 days   Lab Units 23  0538   SODIUM mmol/L 140   POTASSIUM mmol/L 4.0   CHLORIDE mmol/L 105   CO2 mmol/L 23.0   BUN mg/dL 13   CREATININE mg/dL 0.82   GLUCOSE mg/dL 153*   CALCIUM mg/dL 9.4     Results from last 7 days   Lab Units 23  1053   ALK PHOS U/L 48   BILIRUBIN mg/dL 0.5   ALT (SGPT) U/L 27   AST (SGOT) U/L 24              Results from last 7 days   Lab Units 04/07/23  1826   LACTATE mmol/L 1.7             Estimated Creatinine Clearance: 154 mL/min (by C-G formula based on SCr of 0.82 mg/dL).      Microbiology:  Microbiology Results (last 10 days)     Procedure Component Value - Date/Time    Meningitis / Encephalitis Panel, PCR - Cerebrospinal Fluid, Lumbar Puncture [[id number]]  (Normal) Collected: 04/07/23 1418    Lab Status: Final result Specimen: Cerebrospinal Fluid from Lumbar Puncture Updated: 04/07/23 1553     ESCHERICHIA COLI K1, PCR Not Detected     HAEMOPHILUS INFLUENZAE, PCR Not Detected     LISTERIA MONOCYTOGENES, PCR Not Detected     NEISSERIA MENINGITIDIS, PCR Not Detected     STREPTOCOCCUS AGALACTIAE, PCR Not Detected     STREPTOCOCCUS PNEUMONIAE, PCR Not Detected     CYTOMEGALOVIRUS (CMV), PCR Not Detected     ENTEROVIRUS, PCR Not Detected     HERPES SIMPLEX VIRUS 1 (HSV-1), PCR Not Detected     HERPES SIMPLEX VIRUS 2 (HSV-2), PCR Not Detected     HUMAN PARECHOVIRUS, PCR Not Detected     VARICELLA ZOSTER VIRUS (VZV), PCR Not Detected     CRYPTOCOCCUS NEOFORMANS / GATTII, PCR Not Detected     HUMAN HERPES VIRUS 6 PCR Not Detected    Culture, CSF - Cerebrospinal Fluid, Lumbar Puncture [[id number]] Collected: 04/07/23 1304    Lab Status: Preliminary result Specimen: Cerebrospinal Fluid from Lumbar Puncture Updated: 04/08/23 0703     CSF Culture No growth     Gram Stain No WBCs or organisms seen    Blood Culture - Blood, Hand, Right [[id number]]  (Normal) Collected: 04/07/23 1243    Lab Status: Preliminary result Specimen: Blood from Hand, Right Updated: 04/08/23 1301     Blood Culture No growth at 24 hours    Blood Culture - Blood, Hand, Left [[id number]]  (Normal) Collected: 04/07/23 1243    Lab Status: Preliminary result Specimen: Blood from Hand, Left Updated: 04/08/23 1301     Blood Culture No growth at 24 hours    Respiratory Panel PCR w/COVID-19(SARS-CoV-2) JESSY/TAMMY/ULISES/PAD/COR/MAD/WALESKA In-House, NP Swab in UT/VTM, 3-4  HR TAT - Swab, Nasopharynx [[id number]]  (Normal) Collected: 04/07/23 1227    Lab Status: Final result Specimen: Swab from Nasopharynx Updated: 04/07/23 1349     ADENOVIRUS, PCR Not Detected     Coronavirus 229E Not Detected     Coronavirus HKU1 Not Detected     Coronavirus NL63 Not Detected     Coronavirus OC43 Not Detected     COVID19 Not Detected     Human Metapneumovirus Not Detected     Human Rhinovirus/Enterovirus Not Detected     Influenza A PCR Not Detected     Influenza B PCR Not Detected     Parainfluenza Virus 1 Not Detected     Parainfluenza Virus 2 Not Detected     Parainfluenza Virus 3 Not Detected     Parainfluenza Virus 4 Not Detected     RSV, PCR Not Detected     Bordetella pertussis pcr Not Detected     Bordetella parapertussis PCR Not Detected     Chlamydophila pneumoniae PCR Not Detected     Mycoplasma pneumo by PCR Not Detected    Narrative:      In the setting of a positive respiratory panel with a viral infection PLUS a negative procalcitonin without other underlying concern for bacterial infection, consider observing off antibiotics or discontinuation of antibiotics and continue supportive care. If the respiratory panel is positive for atypical bacterial infection (Bordetella pertussis, Chlamydophila pneumoniae, or Mycoplasma pneumoniae), consider antibiotic de-escalation to target atypical bacterial infection.                Radiology:  Imaging Results (Last 72 Hours)     Procedure Component Value Units Date/Time    MRI Brain With & Without Contrast [[id number]] Collected: 04/07/23 1627     Updated: 04/07/23 1636    Narrative:        MRI BRAIN W WO CONTRAST    Date of Exam: 4/7/2023 3:30 PM EDT    Indication: new onset sz protocol.     Comparison: CT earlier the same day    Technique:  Routine multiplanar/multisequence sequence images of the brain were obtained before and after the uneventful administration of  20 mL Multihance.    Findings:   Corresponding to the area of apparent low density  within the left basal ganglia seen on same-day CT, there is a somewhat irregular, partially cystic and avidly enhancing mass measuring 2.2 x 2.0 cm. There is either a separate nodule or small satellite   nodule present posteriorly measuring 8 mm, also enhancing. Moderate surrounding edema is present on FLAIR sequences, extending to involve the left medial temporal lobe. No additional mass or abnormal enhancement is present. The ventricles are normal in   size and configuration. No acute infarct is present on diffusion weighted sequences. The orbits are normal. The paranasal sinuses are grossly clear.      Impression:      Impression:   2.2 cm enhancing and partially cystic mass of the left basal ganglia as above, with surrounding edema extending towards the insular and left temporal regions, favoring primary glial neoplasm over metastasis. Recommend neurosurgical consultation.    Electronically Signed: Christiano Nino    4/7/2023 4:32 PM EDT    Workstation ID: [id number]    CT Head Without Contrast [[id number]] Collected: 04/07/23 1213     Updated: 04/07/23 1218    Narrative:      CT HEAD WO CONTRAST    Date of Exam: 4/7/2023 11:50 AM EDT    Indication: new onset sz.    Comparison: None available.    Technique: Axial CT images were obtained of the head without contrast administration.  Reconstructed coronal and sagittal images were also obtained. Automated exposure control and iterative construction methods were used.    Findings:  Faint hypodensity is seen within the left basal ganglia, likely either chronic lacunar infarct or prominent perivascular space. There is otherwise no evidence of intracranial hemorrhage, mass or mass effect. The ventricles are normal in size and   configuration. The orbits are normal. The paranasal sinuses are grossly clear. The calvarium is intact.      Impression:      Impression:  Faint hypodensity is seen within the left basal ganglia, likely either chronic lacunar infarct or prominent  perivascular space.     No acute intracranial abnormality otherwise.    Electronically Signed: Christiano Nino    2023 12:15 PM EDT    Workstation ID: [id number]    XR Chest 1 View [[id number]] Collected: 23 113     Updated: 23    Narrative:      XR CHEST 1 VW    Date of Exam: 2023 10:53 AM EDT    Indication: Weak/Dizzy/AMS triage protocol.    Comparison: None available.    FINDINGS: No focal airspace opacity. No pleural effusion or pneumothorax. Normal heart and mediastinal contours.      Impression:      No evidence of acute disease in the chest.     Electronically Signed: Christiano Nino    2023 11:31 AM EDT    Workstation ID: [id number]            Impression:   7. 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  Since he is an immunocompromised transplant patient, he will be at risk for opportunistic infection.  I doubt that we will be able to make a diagnosis based on his CSF.  He will almost certainly require a biopsy for diagnosis.  Potential infectious etiologies include a aerobic/anaerobic bacterial infection, cryptococcoma, other fungal infections such as histoplasmosis/blastomycosis, tuberculoma, toxoplasmosis, nocardia, and actinomyces.  NATALI virus induced PML generally has multifocal lesions but this would be another consideration.  Brain tissue should be sent for histology, aerobic/anaerobic/mycobacterial/fungal cultures and I would like to save tissue to send for 16s ribosomal RNA next generation sequencing at the [facility] if the pathology does not demonstrate tumor and is more suggestive of infection.   8. New onset of seizure.  On Keppra  9. Leukocytosis/neutrophilia, stress vs infection  10. Lactic acidosis, stress vs other  11. H/o Liver cancer s/p liver transplant 2013 on Tacrolimus.  12. Immunocompromised host.    PLAN/RECOMMENDATIONS:   Thank you for asking us to see Cheo Garrett, I recommend the followin. Follow blood cultures  7. Toxoplasma PCR  on CSF  8. Add Cryptococcus Ag for CSF.  D/w micro.  9. Check serum Cryptococcus Ag, Quantiferon gold TB, and Toxoplasma serologies.  10. Histoplasma urinary antigen and Blastomyces urinary antigen  11. Awaiting biopsy of brain lesion on 4/11, please save some tissue for 16s ribosomal RNA next generation sequencing studies.    12. Follow off antibiotics for now.    Duncan Pradhan MD saw and examined patient, verified hx and PE, read all radiographic studies, reviewed labs and micro data, and formulated dx, plan for treatment and all medical decision making.      Shiraz May PA-C for Duncan Pradhan MD    I reviewed the CT scan and MRI images with the patient and his wife.  I discussed his extremely complex situation in detail with both him and his wife.  I also discussed his situation with Dr. Piero Gibbons.    Duncan Pradhan MD  4/8/2023  16:49 EDT                    Electronically signed by Duncan Pradhan MD at 04/09/23 1946     Emiliano Hightower PA-C at 04/08/23 1025      Consult Orders    1. Inpatient Neurosurgery Consult [[id number]] ordered by Roxanne Iverson DO                     [facility]  Inpatient Neurosurgery Consult  Consult performed by: Emiliano Hightower PA-C  Consult ordered by: Roxanne Iverson DO        Name: Cheo Garrett  YOB: 1975  MRN: [id number]    Referring Provider: No ref. provider found     Patient Care Team:  Provider, No Known as PCP - General    Chief Complaint: Seizure, contusion    Subjective     History of present illness: Patient is a 47-year-old male with past medical history of liver cancer for which she underwent a transplant at [facility] in 2013, he is on chronic immuno suppression (Tacrolimus 0.5 mg).  For the last week he has had some confusion and increased somnolence.  Wife notes that earlier this week she was having a difficult time arousing him from bed, he would also have delayed and inappropriate  responses beginning on Monday.  This has progressed throughout the week, and then yesterday he suffered a generalized seizure whilst in bed and was shaking and foaming at the mouth.  When EMS arrived at the scene the patient's seizure had resolved, but he was very confused and not really oriented to place and time.  He had had no prior seizures to this event.  Currently patient has had a resolution of this confusion, wife notes that his responses are still a little lagged but he is appropriate.  He denies headache, nausea, vomiting, unilateral weakness, visual changes.    Review of Systems   Constitutional: Negative for diaphoresis and fever.   Eyes: Negative for photophobia and visual disturbance.   Respiratory: Negative for shortness of breath.    Cardiovascular: Negative for palpitations.   Gastrointestinal: Negative for abdominal pain, nausea and vomiting.   Musculoskeletal: Negative for back pain and neck pain.   Allergic/Immunologic: Positive for immunocompromised state.   Neurological: Positive for seizures and speech difficulty. Negative for dizziness, tremors, syncope, facial asymmetry, weakness, light-headedness, numbness and headaches.   Psychiatric/Behavioral: Positive for confusion.       History  Past Medical History:   Diagnosis Date   • Liver cancer        Objective     Vital Signs:  Temp:  [97.7 °F (36.5 °C)-98.4 °F (36.9 °C)] 97.7 °F (36.5 °C)  Heart Rate:  [67-97] 83  Resp:  [18-20] 18  BP: (111-142)/(72-92) 134/91  Body mass index is 29.2 kg/m².    Physical Exam:  Physical Exam  Constitutional:       Appearance: Normal appearance.   HENT:      Head: Normocephalic and atraumatic.   Eyes:      General: No visual field deficit or scleral icterus.        Right eye: No discharge.         Left eye: No discharge.      Extraocular Movements: Extraocular movements intact.      Pupils: Pupils are equal, round, and reactive to light.   Musculoskeletal:      Cervical back: Normal range of motion.      Right  lower leg: No edema.      Left lower leg: No edema.      Comments: 5 out of 5 strength in intrinsic hand muscles bilaterally, 5 out of 5 strength with hip flexion bilaterally.   Neurological:      General: No focal deficit present.      Mental Status: He is alert and oriented to person, place, and time.      GCS: GCS eye subscore is 4. GCS verbal subscore is 5. GCS motor subscore is 6.      Cranial Nerves: Cranial nerves 2-12 are intact. No cranial nerve deficit, dysarthria or facial asymmetry.      Sensory: Sensation is intact. No sensory deficit.      Motor: Motor function is intact. No weakness, tremor or pronator drift.      Coordination: Coordination is intact. Romberg sign negative. Finger-Nose-Finger Test normal.   Psychiatric:         Mood and Affect: Mood normal.         Behavior: Behavior normal.         Thought Content: Thought content normal.         Judgment: Judgment normal.         Data Review:  EEG    Result Date: 4/7/2023    Diffuse cerebral dysfunction of mild degree but nonspecific No ongoing seizures are seen This report is transcribed using the Dragon dictation system.      CT Head Without Contrast    Result Date: 4/7/2023    Impression: Faint hypodensity is seen within the left basal ganglia, likely either chronic lacunar infarct or prominent perivascular space. No acute intracranial abnormality otherwise. Electronically Signed: Christiano Nino  4/7/2023 12:15 PM EDT  Workstation ID: [id number]    MRI Brain With & Without Contrast    Result Date: 4/7/2023    Impression: 2.2 cm enhancing and partially cystic mass of the left basal ganglia as above, with surrounding edema extending towards the insular and left temporal regions, favoring primary glial neoplasm over metastasis. Recommend neurosurgical consultation. Electronically Signed: Christiano Nino  4/7/2023 4:32 PM EDT  Workstation ID: [id number]    History reviewed. No pertinent surgical history.    Assessment & Plan   Diagnosis:  (R56.9) New onset  seizure    (R41.0) Confusion    (D72.829) Leukocytosis, unspecified type    (E87.6) Hypokalemia    (Z74.09) Impaired functional mobility, balance, gait, and endurance    Medical Decision Making:  Patient's brain mass' etiology is unclear at this point, the positioning does favor a primary brain tumor but with his history of liver cancer and immunosuppressed status metastases or possible infection has to be in the differential.  As such we recommend CT with and without contrast of chest, abdomen, pelvis to look for primary tumor site.  Additionally recommend infectious disease consult for an immunocompromised evaluation.    Patient can remain on his Keppra, but where there is minimal perilesional edema we can hold steroids at this time.  Tentative plan for this patient is for biopsy on Tuesday, as such I have made him n.p.o. on Tuesday at 0001.    Patient can remain on the floor until noted otherwise    Emiliano Hightower PA-C  04/08/23  10:25 EDT      Electronically signed by Emiliano Hightower PA-C at 04/08/23 1811     Andrea Burton MD at 04/07/23 1413     Attestation signed by Alyssa Galloway MD at 04/07/23 9142    I independently evaluated and examined the patient along with my third-year medical resident Dr. Andrea Burton and noted the following on the MRI.        Findings:   Corresponding to the area of apparent low density within the left basal ganglia seen on same-day CT, there is a somewhat irregular, partially cystic and avidly enhancing mass measuring 2.2 x 2.0 cm. There is either a separate nodule or small satellite   nodule present posteriorly measuring 8 mm, also enhancing. Moderate surrounding edema is present on FLAIR sequences, extending to involve the left medial temporal lobe. No additional mass or abnormal enhancement is present. The ventricles are normal in   size and configuration. No acute infarct is present on diffusion weighted sequences. The orbits are normal. The paranasal sinuses are grossly  clear.     IMPRESSION:  Impression:   2.2 cm enhancing and partially cystic mass of the left basal ganglia as above, with surrounding edema extending towards the insular and left temporal regions, favoring primary glial neoplasm over metastasis. Recommend neurosurgical consultation.    Neurosurgery has been consulted and the patient has been started on intravenous dexamethasone along with Protonix.    The patient will be starting on Keppra 1000 mg twice daily with food from tomorrow onwards.  He has already been loaded in the emergency room with Keppra 1000 mg intravenous and he has been instructed not to drive or use any hazardous equipment or engage in any hazardous activities until seizure-free for the next 90 days for [place] law or 6 months as per [place] state law.    He will be followed up by our inpatient general neurology team.    Discussed this case on the phone with Dr. Carin Iverson.     I have reviewed this documentation and agree.                  DOS: 2023  NAME: Cheo Garrett   : 1975  PCP: Provider, No Known  CC: Status Epilepticus   Referring MD: Dr. Iverson     Neurological Problem and Interval History:  47 y.o. male with a past medical history of liver transplantation for hepatocellular carcinoma in [id number] who presents after seizure-like activity lasting approximately 10 to 15 minutes earlier today.  Neurology consulted for status epilepticus.     Subjective:     At time of exam, Mr. Garrett has mental slowing and confusion. He is initially not oriented to time believing it is 1923 but corrects himself on the year to  with prompting.  He initially confuses the name of his wife, stating that his wife's name is Cheo (his name) but again corrects himself and correctly identifies her as Noreen with prompting.  Additionally, he asks for help with his eyesight but later clarifies that he means he wants his glasses. He is oriented to place and knows that he is in [place].  He knows that Dontae Samano is president.    History is taken from patient's wife, Noreen Garrett. She reports that she has noticed some intermittent confusion since Monday of this week.  She counseled the patient to seek medical treatment earlier this week but he declined at that time. Today, she noticed a period of seizure-like activity while the patient was sleeping and shows video of seizure-like activity accompanied by grunting.  Patient noted to have blood around his mouth and also has evidence of tongue biting on exam.  No loss of bowel or bladder control.  Wife had previously noted some right arm weakness though this appears to have resolved.    Mr. Garrett has medical history only of liver transplantation for hepatocellular carcinoma, for which he takes tacrolimus as his only current active medication.  He does not report fever, chills.    Mr. Garrett states his transplant was secondary to liver cancer brought about by chronic alcohol use.  He denies alcohol or illicit drug use since his transplant.    No overt seizure-like activity witnessed during encounter    Past Medical/Surgical Hx:  Past Medical History:   Diagnosis Date   • Liver cancer      History reviewed. No pertinent surgical history.    Review of Systems:    Constitutional: Does not report fever, chills  Cardiovascular: Does not report chest pain, palpitations  Respiratory: Does not report shortness of breath  Gastrointestinal: Does not report nausea, vomiting, abdominal pain  Genitourinary: Does not report dysuria, urinary retention, or urinary frequency  Neurological: Positive for confusion.  Positive for difficulty with speech.  HEENT: Positive for blood from tongue bite      Medications On Admission  (Not in a hospital admission)      Allergies:  No Known Allergies    Social Hx:  Social History     Socioeconomic History   • Marital status:        Family Hx:  History reviewed. No pertinent family history.        Laboratory Results:   Lab  "Results   Component Value Date    GLUCOSE 151 (H) 04/07/2023    CALCIUM 8.8 04/07/2023     04/07/2023    K 3.1 (L) 04/07/2023    CO2 19.0 (L) 04/07/2023     04/07/2023    BUN 12 04/07/2023    CREATININE 1.08 04/07/2023    BCR 11.1 04/07/2023    ANIONGAP 17.0 (H) 04/07/2023     Lab Results   Component Value Date    WBC 18.22 (H) 04/07/2023    HGB 16.6 04/07/2023    HCT 46.4 04/07/2023    MCV 86.7 04/07/2023     04/07/2023     No results found for: CHOL  No results found for: HDL  No results found for: LDL  No results found for: TRIG  No results found for: HGBA1C  Lab Results   Component Value Date    INR 1.08 04/07/2023    PROTIME 14.0 04/07/2023     No results found for: FHFTLPMA01  No results found for: FOLATE    Physical Examination:  /85   Pulse 75   Temp 97.7 °F (36.5 °C) (Oral)   Resp 18   Ht 193 cm (76\")   Wt 111 kg (245 lb)   SpO2 97%   BMI 29.82 kg/m²   General Appearance:   Well developed, well nourished, well groomed, alert with some confusion and mental slowing, and cooperative.  No overt seizure-like activity seen at time of exam.  HEENT: Normocephalic.  Blood noted from tongue bite.  Neck and Spine: Normal range of motion.  Normal alignment.  Extremities:    No edema or deformities. Normal joint ROM. Te  Skin:    No rashes or birth marks.    Neurological examination:  Higher Integrative  Function: Patient has mental slowing and confusion.  He is not initially oriented to time but corrects himself with prompting.  He is oriented to place and is able to identify the president.  Slowed recall, attention span and concentration.  Patient does have trouble confusing words, mixing up his wife's name with his own and using the word eyesight for glasses.   CN II: Pupils are equal, round, and reactive to light. Normal visual acuity and visual fields.    CN III IV VI: Extraocular movements are full without nystagmus.   CN V: Normal facial sensation and strength of muscles of " mastication.  CN VII: Facial movements are symmetric. No weakness.  CN VIII:   Auditory acuity is normal.  CN IX & X:   Symmetric palatal movement.  CN XII:   The tongue is midline.  No atrophy or fasciculations.  Motor: Normal muscle strength, bulk and tone in upper and lower extremities.  No fasciculations, rigidity, spasticity, or abnormal movements.  Reflexes: 2+ in the upper and lower extremities. Plantar responses are flexor.  Sensation: Normal to light touch.  al.      Diagnoses / Discussion:  47 y.o. with a past medical history of liver transplant on suppressive therapy with tacrolimus who presents after 1 week of intermittent confusion and 1 episode of seizure-like activity lasting approximately 10 to 15 minutes.      Most likely represents new onset status epilepticus requiring further workup.     Given history of immunosuppression on tacrolimus, with new onset seizure, there would be concern for potential for infection such as meningitis or encephalitis.     However, lumbar puncture shows 0 white blood cells in CSF. EEG demonstrates intermittent left temporal slowing and diffuse cerebral dysfunction with no ongoing seizure-like activity. CT head without contrast demonstrates faint hypodensity in left basal ganglia.    Tacrolimus itself can be associated with neural toxicity and would agree with checking tacrolimus level, though this can happen even at normal levels. If considering alternate agent, discussion with patient's transplant physician would be warranted.     White blood count and lactate are elevated at 18.22 and 6.4, respectively. However, patient is afebrile.  Chest x-ray does not demonstrate evidence of acute disease.  Urinalysis does not demonstrate evidence of urinary tract infection.  Respiratory panel negative.  Procalcitonin normal.  Blood cultures in process. Leukocytosis and elevated lactate may represent stress response in the setting of seizure.    Looking at other causes for new  onset status epilepticus, although patient has history of liver transplantation, liver enzymes and ammonia are normal. Likewise, Ethanol, acetaminophen level, salicylate level and UDS negative, and CMP shows no sign of gross electrolyte derangement with only mild hypokalemia. TSH and T4 also within normal limits.      Plan:    -Administer Keppra load and subsequently initiate therapy with 500 mg twice daily  -MRI brain, pending  -Meningitis/encephalitis panel in process  -CSF culture in process  -Obtain tacrolimus level   -Continue to trend white blood count and follow blood cultures  -Fall and seizure precautions    I have discussed the above with the patient and family.  Time spent with patient: 70 minutes in face-to-face evaluation and management of the patient.    Electronically signed by Andrea Burton MD, 04/07/23, 2:13 PM EDT.    Dictated using Dragon dictation.              Electronically signed by Alyssa Galloway MD at 04/07/23 0314

## 2023-04-10 NOTE — CASE MANAGEMENT/SOCIAL WORK
Continued Stay Note  [facility]     Patient Name: Cheo Garrett  MRN: [id number]  Today's Date: 4/10/2023    Admit Date: 4/7/2023    Plan: Ongoing   Discharge Plan     Row Name 04/10/23 1215       Plan    Plan Ongoing    Patient/Family in Agreement with Plan yes    Plan Comments Discharge plan is ongoing.  Patient is scheduled to have brain biopsy on Tuesday April 11.  CM will continue to follow and assist with discharge needs.               Discharge Codes    No documentation.               Expected Discharge Date and Time     Expected Discharge Date Expected Discharge Time    Apr 12, 2023             Baylee Valenzuela RN

## 2023-04-10 NOTE — PROGRESS NOTES
"    [facility]    Cheo Garrett  1975  [id number]      Seizures    Liver transplanted    Immunosuppressed status      Admit Diagnosis: Seizure [R56.9]  Seizures [R56.9]  Date of Admission: 4/7/2023 10:43 AM     Interval History: Patient has no new complaints at this time.    Physical Exam:  Blood pressure 126/83, pulse 70, temperature 97.9 °F (36.6 °C), temperature source Oral, resp. rate 16, height 193 cm (75.98\"), weight 109 kg (239 lb 12.8 oz), SpO2 97 %.  No intake/output data recorded.  Physical Exam   Patient is sitting upright in the bed, he is pleasant, conversational, responds all commands.  Moves all extremities spontaneously.  Cranial nerves grossly intact.    Data Review:   (All imaging reviewed independently unless state otherwise)  No new data to review at this time.    Diagnosis:  (R56.9) New onset seizure    (R41.0) Confusion    (D72.829) Leukocytosis, unspecified type    (E87.6) Hypokalemia    (Z74.09) Impaired functional mobility, balance, gait, and endurance    (G93.89) Brain mass - Plan: Case Request, Case Request  Seizure [R56.9]  Seizures [R56.9]      Medical Decision Making:   Plan is for patient to undergo biopsy tomorrow with Dr. Raymond Gibbons, he will be made n.p.o. tomorrow beginning at [id number].  Please continue Keppra in the interim.  Final recommendations will be made by Dr. Gibbons's attestation.    Emiliano Hightower PA-C  4/10/2023    "

## 2023-04-10 NOTE — PROGRESS NOTES
"DOS: 4/10/2023  NAME: Cheo Garrett   : 1975  PCP: Provider, No Known  Chief Complaint   Patient presents with   • Altered Mental Status       Chief complaint: No new clinical seizures noted.  Subjective: Patient is awaiting stereotactic biopsy of his deep lesion in the basal ganglia tomorrow by Dr. Gibbons.    Objective:  Vital signs: /91 (BP Location: Right arm, Patient Position: Sitting)   Pulse 77   Temp 98.2 °F (36.8 °C) (Oral)   Resp 16   Ht 193 cm (75.98\")   Wt 109 kg (239 lb 12.8 oz)   SpO2 95%   BMI 29.20 kg/m²    Gen: NAD, vitals reviewed  MS: Back to baseline.  CN: Cranials 2-12: No focal deficits.  Motor: Muscles of both upper and lower extremities show good bulk power and tone.  Sensory: No sensory loss noted.  DTRs: 2+ bilaterally with downgoing toes.  Coordination: Normal finger-to-nose coordination.  Gait: He is able to get up and ambulate independently.    ROS:  General: Pleasant gentleman currently in no distress.  Neurological: No focal neurological deficits and no seizures so far.    Laboratory results:  Lab Results   Component Value Date    GLUCOSE 97 2023    CALCIUM 9.3 2023     2023    K 3.7 2023    CO2 26.0 2023     2023    BUN 16 2023    CREATININE 0.80 2023    BCR 20.0 2023    ANIONGAP 11.0 2023     Lab Results   Component Value Date    WBC 9.66 2023    HGB 15.3 2023    HCT 43.9 2023    MCV 86.6 2023     2023     No results found for: LDL         Review of labs: The lab work looks unremarkable.    Review and interpretation of imaging: The MRI of the brain with and without contrast was reviewed in details that shows the following:    Findings:   Corresponding to the area of apparent low density within the left basal ganglia seen on same-day CT, there is a somewhat irregular, partially cystic and avidly enhancing mass measuring 2.2 x 2.0 cm. There is either a separate " nodule or small satellite   nodule present posteriorly measuring 8 mm, also enhancing. Moderate surrounding edema is present on FLAIR sequences, extending to involve the left medial temporal lobe. No additional mass or abnormal enhancement is present. The ventricles are normal in   size and configuration. No acute infarct is present on diffusion weighted sequences. The orbits are normal. The paranasal sinuses are grossly clear.     IMPRESSION:  Impression:   2.2 cm enhancing and partially cystic mass of the left basal ganglia as above, with surrounding edema extending towards the insular and left temporal regions, favoring primary glial neoplasm over metastasis    Workup to date: The CSF studies initially performed shows the following:     Latest Reference Range & Units Most Recent   Supernatant Color, CSF Colorless  Colorless  4/7/23 13:04   Appearance, CSF Clear  Clear  4/7/23 13:04   Color, CSF Colorless  Colorless  4/7/23 13:04   Tube Number, CSF  1  4/7/23 13:04   Volume, CSF mL 8.0  4/7/23 13:04   WBC, CSF 0 - 5 /mm3 0  4/7/23 13:04   RBC, CSF 0 - 5 /mm3 18 (H)  4/7/23 13:04   Glucose, CSF 40 - 70 mg/dL 88 (H)  4/7/23 13:04   Protein, Total (CSF) 15.0 - 45.0 mg/dL 30.7  4/7/23 13:04   (H): Data is abnormally high    The CSF study for the meningitis encephalitis panel shows the following:      ESCHERICHIA COLI K1, PCR  Not Detected Not Detected    HAEMOPHILUS INFLUENZAE, PCR  Not Detected Not Detected    LISTERIA MONOCYTOGENES, PCR  Not Detected Not Detected    NEISSERIA MENINGITIDIS, PCR  Not Detected Not Detected    STREPTOCOCCUS AGALACTIAE, PCR  Not Detected Not Detected    STREPTOCOCCUS PNEUMONIAE, PCR  Not Detected Not Detected    CYTOMEGALOVIRUS (CMV), PCR  Not Detected Not Detected    ENTEROVIRUS, PCR  Not Detected Not Detected    HERPES SIMPLEX VIRUS 1 (HSV-1), PCR  Not Detected Not Detected    HERPES SIMPLEX VIRUS 2 (HSV-2), PCR  Not Detected Not Detected    HUMAN PARECHOVIRUS, PCR  Not Detected Not  Detected    VARICELLA ZOSTER VIRUS (VZV), PCR  Not Detected Not Detected    CRYPTOCOCCUS NEOFORMANS / GATTII, PCR  Not Detected Not Detected    HUMAN HERPES VIRUS 6 PCR  Not Detected Not Detected    Resulting Agency [facility]       Diagnoses: Patient with new onset seizure as well as possibility of glioblastoma multiforme versus oligodendroglioma or glial 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    Plan:  1.  It will be then decided whether he needs oncologist or radiation therapist.  2.  He will continue the Keppra as before.  If he is unable to swallow postsurgery, then it is best to continue the Keppra at the intravenous level milligram 2 mg switch.  3.  He has been instructed not to drive or use any hazardous equipment or engage in any hazardous activities until seizure-free for the next 6 months as per the [facility] driving law.  4.  It would be advisable to give him a dose of Keppra 1000 mg intravenous prior to initiating the biopsy process.    Discussed with the patient and his wife and at this point we will sign off.  If for any reason there is any recurrence of seizures or any other questions arise please reconsult us.  Patient will most probably follow-up with a neurologist and is local hometown of [facility].    Spent a total of 30 minutes in face-to-face evaluation and management of the patient.    Electronically signed by Alyssa Galloway MD, 04/10/23, 4:34 PM EDT.

## 2023-04-10 NOTE — PROGRESS NOTES
[facility] Medicine Services  PROGRESS NOTE    Patient Name: Cheo Garrett  : 1975  MRN: [id number]    Date of Admission: 2023  Primary Care Physician: Provider, No Known    Subjective   Subjective     CC:  F/u seizures    HPI:  Patient states he slept very well last night. No further visual hallucinations, complaints of headache.    ROS:  Gen- No fevers, chills  CV- No chest pain, palpitations  Resp- No cough, dyspnea  GI- No N/V/D, abd pain    Objective   Objective     Vital Signs:   Temp:  [97.6 °F (36.4 °C)-98.4 °F (36.9 °C)] 97.9 °F (36.6 °C)  Heart Rate:  [] 70  Resp:  [16-18] 16  BP: (124-143)/(82-94) 126/83     Physical Exam:  Constitutional: No acute distress, awake, alert, sitting up in bed  HENT: NCAT, mucous membranes moist  Respiratory:  respiratory effort normal   Cardiovascular: RRR, no murmurs, rubs, or gallops  Musculoskeletal: No bilateral ankle edema  Psychiatric: Appropriate affect, cooperative  Neurologic: Oriented x3, speech clear, no focal deficits currently  Skin: No rashes      Results Reviewed:  LAB RESULTS:      Lab 23  0640 23  0538 23  1826 23  1243 23  1053   WBC 9.66 9.94  --   --  18.22*   HEMOGLOBIN 15.3 17.2  --   --  16.6   HEMATOCRIT 43.9 47.4  --   --  46.4   PLATELETS 145 168  --   --  161   NEUTROS ABS 5.92 8.20*  --   --  15.73*   IMMATURE GRANS (ABS) 0.09* 0.04  --   --  0.10*   LYMPHS ABS 2.39 1.30  --   --  0.55*   MONOS ABS 1.15* 0.38  --   --  1.79*   EOS ABS 0.05 0.00  --   --  0.01   MCV 86.6 85.3  --   --  86.7   PROCALCITONIN  --   --   --   --  0.03   LACTATE  --   --  1.7 4.0* 6.4*   PROTIME  --   --   --   --  14.0         Lab 23  0640 23  0538 23  1053   SODIUM 144 140 140   POTASSIUM 3.7 4.0 3.1*   CHLORIDE 107 105 104   CO2 26.0 23.0 19.0*   ANION GAP 11.0 12.0 17.0*   BUN 16 13 12   CREATININE 0.80 0.82 1.08   EGFR 111.2 110.4 86.2   GLUCOSE 97 153* 151*   CALCIUM 9.3 9.4  8.8   MAGNESIUM  --   --  1.9   TSH  --   --  1.350         Lab 04/07/23  1053   TOTAL PROTEIN 6.5   ALBUMIN 4.2   GLOBULIN 2.3   ALT (SGPT) 27   AST (SGOT) 24   BILIRUBIN 0.5   ALK PHOS 48         Lab 04/07/23  1053   HSTROP T 9   PROTIME 14.0   INR 1.08                 Brief Urine Lab Results  (Last result in the past 365 days)      Color   Clarity   Blood   Leuk Est   Nitrite   Protein   CREAT   Urine HCG        04/07/23 1227 Yellow   Cloudy   Moderate (2+)   Negative   Negative   30 mg/dL (1+)                 Microbiology Results Abnormal     Procedure Component Value - Date/Time    Culture, CSF - Cerebrospinal Fluid, Lumbar Puncture [[id number]] Collected: 04/07/23 1304    Lab Status: Final result Specimen: Cerebrospinal Fluid from Lumbar Puncture Updated: 04/10/23 0652     CSF Culture No growth at 3 days     Gram Stain No WBCs or organisms seen    Blood Culture - Blood, Hand, Right [[id number]]  (Normal) Collected: 04/07/23 1243    Lab Status: Preliminary result Specimen: Blood from Hand, Right Updated: 04/09/23 1301     Blood Culture No growth at 2 days    Blood Culture - Blood, Hand, Left [[id number]]  (Normal) Collected: 04/07/23 1243    Lab Status: Preliminary result Specimen: Blood from Hand, Left Updated: 04/09/23 1301     Blood Culture No growth at 2 days    Cryptococcal AG, CSF - Cerebrospinal Fluid, Lumbar Puncture [[id number]]  (Normal) Collected: 04/07/23 1304    Lab Status: Final result Specimen: Cerebrospinal Fluid from Lumbar Puncture Updated: 04/09/23 0734     Cryptococcal Antigen, CSF Negative    Meningitis / Encephalitis Panel, PCR - Cerebrospinal Fluid, Lumbar Puncture [[id number]]  (Normal) Collected: 04/07/23 1418    Lab Status: Final result Specimen: Cerebrospinal Fluid from Lumbar Puncture Updated: 04/07/23 1553     ESCHERICHIA COLI K1, PCR Not Detected     HAEMOPHILUS INFLUENZAE, PCR Not Detected     LISTERIA MONOCYTOGENES, PCR Not Detected     NEISSERIA MENINGITIDIS, PCR Not Detected      STREPTOCOCCUS AGALACTIAE, PCR Not Detected     STREPTOCOCCUS PNEUMONIAE, PCR Not Detected     CYTOMEGALOVIRUS (CMV), PCR Not Detected     ENTEROVIRUS, PCR Not Detected     HERPES SIMPLEX VIRUS 1 (HSV-1), PCR Not Detected     HERPES SIMPLEX VIRUS 2 (HSV-2), PCR Not Detected     HUMAN PARECHOVIRUS, PCR Not Detected     VARICELLA ZOSTER VIRUS (VZV), PCR Not Detected     CRYPTOCOCCUS NEOFORMANS / GATTII, PCR Not Detected     HUMAN HERPES VIRUS 6 PCR Not Detected    Respiratory Panel PCR w/COVID-19(SARS-CoV-2) [facility] In-House, NP Swab in UTM/VTM, 3-4 HR TAT - Swab, Nasopharynx [[id number]]  (Normal) Collected: 04/07/23 1227    Lab Status: Final result Specimen: Swab from Nasopharynx Updated: 04/07/23 1349     ADENOVIRUS, PCR Not Detected     Coronavirus 229E Not Detected     Coronavirus HKU1 Not Detected     Coronavirus NL63 Not Detected     Coronavirus OC43 Not Detected     COVID19 Not Detected     Human Metapneumovirus Not Detected     Human Rhinovirus/Enterovirus Not Detected     Influenza A PCR Not Detected     Influenza B PCR Not Detected     Parainfluenza Virus 1 Not Detected     Parainfluenza Virus 2 Not Detected     Parainfluenza Virus 3 Not Detected     Parainfluenza Virus 4 Not Detected     RSV, PCR Not Detected     Bordetella pertussis pcr Not Detected     Bordetella parapertussis PCR Not Detected     Chlamydophila pneumoniae PCR Not Detected     Mycoplasma pneumo by PCR Not Detected    Narrative:      In the setting of a positive respiratory panel with a viral infection PLUS a negative procalcitonin without other underlying concern for bacterial infection, consider observing off antibiotics or discontinuation of antibiotics and continue supportive care. If the respiratory panel is positive for atypical bacterial infection (Bordetella pertussis, Chlamydophila pneumoniae, or Mycoplasma pneumoniae), consider antibiotic de-escalation to target atypical bacterial infection.          CT Abdomen  Pelvis With & Without Contrast    Result Date: 4/8/2023  CT CHEST W WO CONTRAST DIAGNOSTIC, CT ABDOMEN PELVIS W WO CONTRAST Date of Exam: 4/8/2023 7:08 PM EDT Indication: Brain mass, evaluate for malignancy, metastatic workup Comparison: CT head without contrast, MRI brain 4/7/2023 Technique: Axial CT images were obtained of the chest , abdomen, and pelvis before and after the uneventful intravenous administration of 100 mL Isovue 300.  Reconstructed coronal and sagittal images were also obtained. Automated exposure control and iterative construction methods were used. Findings: Chest: Thoracic aortic arch branch vasculature is patent. Heart size is normal. Negative for pericardial effusion. No pulmonary embolus. Negative for mediastinal, hilar, or axillary adenopathy. Bilateral gynecomastia noted. Trachea and mainstem bronchi are patent. Lungs without consolidation. No pneumothorax. Mild linear atelectasis at the left lower lobe. No suspicious pulmonary nodule or mass. Thoracic vertebral bodies are maintained in height. Negative for aggressive osseous lesion or fracture. Mild wall thickening of distal esophagus which may relate to reflux or esophagitis. Abdomen and pelvis: Initial noncontrast imaging demonstrates no radiodense renal calculus. No ureteral calculus. Subsequently post contrast images were obtained. Post surgical changes of prior hepatic transplant. No liver lesion. The spleen is normal in size. Adrenal glands  are normal. Pancreas without evidence of pancreatitis. Is no pancreatic mass. Gallbladder absent. No biliary dilatation. Kidneys are symmetrically enhancing. There is no enhancing or suspicious renal mass. No hydronephrosis or hydroureter. Urinary bladder is thin-walled. Prostate normal in size. Delayed images demonstrate normal contrast excretion into the nondilated ureters. Negative for pneumoperitoneum. No bowel obstruction. No fluid collection. The appendix is normal. Fluid-filled distention  of the stomach. The portal vein and hepatic veins are patent. The splenic vein and superior mesenteric vein are patent. There are prominent venous collaterals in the left upper quadrant extending into the left mid and lower abdomen adjacent to the infrarenal aorta which may relate to sequela of prior portal hypertension given history of liver transplant. Negative for central mesenteric or retroperitoneal adenopathy. No aggressive osseous lesion or acute fracture. Disc narrowing lower lumbar spine at L5-S1.     Impression: Impression: 1. No findings of primary malignancy or metastatic adenopathy in the chest, abdomen, or pelvis. 2. Post surgical changes of liver transplant. 3. Wall thickening at mid and distal esophagus, correlate for findings of reflux/esophagitis. 4. Additional incidental chronic findings above. Electronically Signed: Juan Chandler  4/8/2023 8:52 PM EDT  Workstation ID: [id number]    CT Chest With & Without Contrast Diagnostic    Result Date: 4/8/2023  CT CHEST W WO CONTRAST DIAGNOSTIC, CT ABDOMEN PELVIS W WO CONTRAST Date of Exam: 4/8/2023 7:08 PM EDT Indication: Brain mass, evaluate for malignancy, metastatic workup Comparison: CT head without contrast, MRI brain 4/7/2023 Technique: Axial CT images were obtained of the chest , abdomen, and pelvis before and after the uneventful intravenous administration of 100 mL Isovue 300.  Reconstructed coronal and sagittal images were also obtained. Automated exposure control and iterative construction methods were used. Findings: Chest: Thoracic aortic arch branch vasculature is patent. Heart size is normal. Negative for pericardial effusion. No pulmonary embolus. Negative for mediastinal, hilar, or axillary adenopathy. Bilateral gynecomastia noted. Trachea and mainstem bronchi are patent. Lungs without consolidation. No pneumothorax. Mild linear atelectasis at the left lower lobe. No suspicious pulmonary nodule or mass. Thoracic vertebral bodies are maintained  in height. Negative for aggressive osseous lesion or fracture. Mild wall thickening of distal esophagus which may relate to reflux or esophagitis. Abdomen and pelvis: Initial noncontrast imaging demonstrates no radiodense renal calculus. No ureteral calculus. Subsequently post contrast images were obtained. Post surgical changes of prior hepatic transplant. No liver lesion. The spleen is normal in size. Adrenal glands  are normal. Pancreas without evidence of pancreatitis. Is no pancreatic mass. Gallbladder absent. No biliary dilatation. Kidneys are symmetrically enhancing. There is no enhancing or suspicious renal mass. No hydronephrosis or hydroureter. Urinary bladder is thin-walled. Prostate normal in size. Delayed images demonstrate normal contrast excretion into the nondilated ureters. Negative for pneumoperitoneum. No bowel obstruction. No fluid collection. The appendix is normal. Fluid-filled distention of the stomach. The portal vein and hepatic veins are patent. The splenic vein and superior mesenteric vein are patent. There are prominent venous collaterals in the left upper quadrant extending into the left mid and lower abdomen adjacent to the infrarenal aorta which may relate to sequela of prior portal hypertension given history of liver transplant. Negative for central mesenteric or retroperitoneal adenopathy. No aggressive osseous lesion or acute fracture. Disc narrowing lower lumbar spine at L5-S1.     Impression: Impression: 1. No findings of primary malignancy or metastatic adenopathy in the chest, abdomen, or pelvis. 2. Post surgical changes of liver transplant. 3. Wall thickening at mid and distal esophagus, correlate for findings of reflux/esophagitis. 4. Additional incidental chronic findings above. Electronically Signed: Juan Chandler  4/8/2023 8:52 PM EDT  Workstation ID: [id number]          Current medications:  Scheduled Meds:levETIRAcetam, 1,000 mg, Oral, Q12H  sodium chloride, 10 mL,  Intravenous, Q12H  tacrolimus, 1.5 mg, Oral, Q12H      Continuous Infusions:   PRN Meds:.•  acetaminophen  •  Calcium Replacement - Follow Nurse / BPA Driven Protocol  •  LORazepam  •  Magnesium Standard Dose Replacement - Follow Nurse / BPA Driven Protocol  •  ondansetron  •  Phosphorus Replacement - Follow Nurse / BPA Driven Protocol  •  Potassium Replacement - Follow Nurse / BPA Driven Protocol  •  sodium chloride  •  sodium chloride  •  sodium chloride    Assessment & Plan   Assessment & Plan     Active Hospital Problems    Diagnosis  POA   • **Seizures [R56.9]  Yes   • Liver transplanted [Z94.4]  Not Applicable   • Immunosuppressed status [D84.9]  Yes      Resolved Hospital Problems   No resolved problems to display.        Brief Hospital Course to date:  Cheo Garrett is a 47 y.o. male with PMHx significant for liver cancer s/p Liver transplant at [facility] (2013) on chronic immunosuppressant therapy who presented today via EMS after wife found him having a seizure at home this morning.     Brain Mass  New Onset Seizures  Confusion  - MRI brain with 2.2 cm enhancing and partially cystic mass of the left basal ganglia with surrounding edema extending towards the insular and left temporal regions, favoring primary glial neoplasm   - Neurology following, continue keppra BID  - appreciate Neurosurgery input, recommend biopsy, tentatively planned for Tuesday  - CT chest/abd/pelvis with no findings concerning for primary malignancy  - ID consult per NSGY recommendations, infectious work-up ongoing  - seizure precautions, ativan PRN  - SLP evaluation, PT/OT following  - hold on steroids due to minimal edema     Lactic Acidosis:  - likely secondary to seizures, now resolved     Hx of Liver Transplant   Hx of Liver Cancer (data deficient)  Immunosuppressive Therapy  - stable and follows with [facility] yearly  - Tacrolimus level pending  - liver enzymes within normal limits     Leukocytosis - resolved  - UA  negative, CXR negative, CSF studies negative  - possibly reactive in setting of no fever, normal procal  - blood cultures are negative  - ID following    Expected Discharge Location and Transportation: Home  Expected Discharge   Expected Discharge Date and Time     Expected Discharge Date Expected Discharge Time    Apr 12, 2023       3-4 days    DVT prophylaxis:  Mechanical DVT prophylaxis orders are present.     AM-PAC 6 Clicks Score (PT): 24 (04/09/23 0800)    CODE STATUS:   Code Status and Medical Interventions:   Ordered at: 04/07/23 1354     Level Of Support Discussed With:    Patient     Code Status (Patient has no pulse and is not breathing):    CPR (Attempt to Resuscitate)     Medical Interventions (Patient has pulse or is breathing):    Full Support       Roxanne Iverson,   04/10/23

## 2023-04-11 ENCOUNTER — ANESTHESIA EVENT (OUTPATIENT)
Dept: PERIOP | Facility: HOSPITAL | Age: 48
DRG: 26 | End: 2023-04-11
Payer: COMMERCIAL

## 2023-04-11 ENCOUNTER — ANESTHESIA (OUTPATIENT)
Dept: PERIOP | Facility: HOSPITAL | Age: 48
DRG: 26 | End: 2023-04-11
Payer: COMMERCIAL

## 2023-04-11 ENCOUNTER — APPOINTMENT (OUTPATIENT)
Dept: CT IMAGING | Facility: HOSPITAL | Age: 48
DRG: 26 | End: 2023-04-11
Payer: COMMERCIAL

## 2023-04-11 LAB
ACETONE SERPL-MCNC: <.01 G/DL (ref 0–0.01)
BUTALBITAL SERPL-MCNC: <1 UG/ML (ref 1–10)
CHLORDIAZEP SERPL-MCNC: <0.1 UG/ML (ref 0.1–0.9)
DIAZEPAM SERPL-MCNC: <0.1 UG/ML (ref 0.1–0.9)
ETHANOL BLD GC-MCNC: <.01 G/DL (ref 0–0.01)
ISOPROPANOL SERPL-MCNC: <.01 G/DL (ref 0–0.01)
Lab: ABNORMAL
METHANOL SERPL-MCNC: <.01 G/DL (ref 0–0.01)
NORCHLORDIAZEP SERPL-MCNC: <0.1 UG/ML (ref 0.1–0.6)
NORDIAZEPAM SERPL-MCNC: <0.1 UG/ML (ref 0.1–1.4)
PENTOBARB SERPL-MCNC: <1 UG/ML (ref 1–5)
PHENOBARB SERPL-MCNC: <1 UG/ML (ref 15–40)
SALICYLATES SERPL-MCNC: ABNORMAL UG/ML (ref 30–250)
TACROLIMUS BLD LC/MS/MS-MCNC: 5.3 NG/ML (ref 2–20)

## 2023-04-11 PROCEDURE — 70450 CT HEAD/BRAIN W/O DYE: CPT

## 2023-04-11 PROCEDURE — 87015 SPECIMEN INFECT AGNT CONCNTJ: CPT | Performed by: NEUROLOGICAL SURGERY

## 2023-04-11 PROCEDURE — 00B03ZX EXCISION OF BRAIN, PERCUTANEOUS APPROACH, DIAGNOSTIC: ICD-10-PCS | Performed by: NEUROLOGICAL SURGERY

## 2023-04-11 PROCEDURE — C1713 ANCHOR/SCREW BN/BN,TIS/BN: HCPCS | Performed by: NEUROLOGICAL SURGERY

## 2023-04-11 PROCEDURE — 61750 INCISE SKULL/BRAIN BIOPSY: CPT | Performed by: NEUROLOGICAL SURGERY

## 2023-04-11 PROCEDURE — 88307 TISSUE EXAM BY PATHOLOGIST: CPT | Performed by: NEUROLOGICAL SURGERY

## 2023-04-11 PROCEDURE — 88342 IMHCHEM/IMCYTCHM 1ST ANTB: CPT | Performed by: NEUROLOGICAL SURGERY

## 2023-04-11 PROCEDURE — 87205 SMEAR GRAM STAIN: CPT | Performed by: NEUROLOGICAL SURGERY

## 2023-04-11 PROCEDURE — 8E09XBZ COMPUTER ASSISTED PROCEDURE OF HEAD AND NECK REGION: ICD-10-PCS | Performed by: NEUROLOGICAL SURGERY

## 2023-04-11 PROCEDURE — 87070 CULTURE OTHR SPECIMN AEROBIC: CPT | Performed by: NEUROLOGICAL SURGERY

## 2023-04-11 PROCEDURE — 25010000002 CEFAZOLIN IN DEXTROSE 2-4 GM/100ML-% SOLUTION

## 2023-04-11 PROCEDURE — 87102 FUNGUS ISOLATION CULTURE: CPT | Performed by: NEUROLOGICAL SURGERY

## 2023-04-11 PROCEDURE — 87075 CULTR BACTERIA EXCEPT BLOOD: CPT | Performed by: NEUROLOGICAL SURGERY

## 2023-04-11 PROCEDURE — 25010000002 FENTANYL CITRATE (PF) 100 MCG/2ML SOLUTION: Performed by: ANESTHESIOLOGY

## 2023-04-11 PROCEDURE — 25010000002 PROPOFOL 10 MG/ML EMULSION: Performed by: ANESTHESIOLOGY

## 2023-04-11 PROCEDURE — 87176 TISSUE HOMOGENIZATION CULTR: CPT | Performed by: NEUROLOGICAL SURGERY

## 2023-04-11 PROCEDURE — 88331 PATH CONSLTJ SURG 1 BLK 1SPC: CPT | Performed by: NEUROLOGICAL SURGERY

## 2023-04-11 PROCEDURE — 99233 SBSQ HOSP IP/OBS HIGH 50: CPT | Performed by: INTERNAL MEDICINE

## 2023-04-11 PROCEDURE — 25010000002 HYDROMORPHONE 1 MG/ML SOLUTION

## 2023-04-11 PROCEDURE — 63710000001 TACROLIMUS PER 1 MG

## 2023-04-11 PROCEDURE — 25010000002 ONDANSETRON PER 1 MG: Performed by: INTERNAL MEDICINE

## 2023-04-11 PROCEDURE — 25010000002 DEXAMETHASONE PER 1 MG: Performed by: ANESTHESIOLOGY

## 2023-04-11 DEVICE — WAX BONE HEMO AESCULAP 2.5GM: Type: IMPLANTABLE DEVICE | Site: CRANIAL | Status: FUNCTIONAL

## 2023-04-11 DEVICE — HEMOST ABS SURGIFOAM SZ100 8X12 10MM: Type: IMPLANTABLE DEVICE | Site: CRANIAL | Status: FUNCTIONAL

## 2023-04-11 DEVICE — PLT CVR BURHL MATRIXNEURO TI 17MM: Type: IMPLANTABLE DEVICE | Site: CRANIAL | Status: FUNCTIONAL

## 2023-04-11 DEVICE — FLOSEAL HEMOSTATIC MATRIX, 10ML
Type: IMPLANTABLE DEVICE | Site: CRANIAL | Status: FUNCTIONAL
Brand: FLOSEAL HEMOSTATIC MATRIX

## 2023-04-11 DEVICE — SCRW MATRIXNEURO SD TI 4MM: Type: IMPLANTABLE DEVICE | Site: CRANIAL | Status: FUNCTIONAL

## 2023-04-11 DEVICE — ABSORBABLE HEMOSTAT (OXIDIZED REGENERATED CELLULOSE, U.S.P.)
Type: IMPLANTABLE DEVICE | Site: CRANIAL | Status: FUNCTIONAL
Brand: SURGICEL

## 2023-04-11 RX ORDER — LIDOCAINE HYDROCHLORIDE 10 MG/ML
0.5 INJECTION, SOLUTION EPIDURAL; INFILTRATION; INTRACAUDAL; PERINEURAL ONCE AS NEEDED
Status: DISCONTINUED | OUTPATIENT
Start: 2023-04-11 | End: 2023-04-11 | Stop reason: HOSPADM

## 2023-04-11 RX ORDER — POLYETHYLENE GLYCOL 3350 17 G/17G
17 POWDER, FOR SOLUTION ORAL DAILY PRN
Status: DISCONTINUED | OUTPATIENT
Start: 2023-04-11 | End: 2023-04-12

## 2023-04-11 RX ORDER — SODIUM CHLORIDE 0.9 % (FLUSH) 0.9 %
10 SYRINGE (ML) INJECTION AS NEEDED
Status: DISCONTINUED | OUTPATIENT
Start: 2023-04-11 | End: 2023-04-11 | Stop reason: HOSPADM

## 2023-04-11 RX ORDER — MAGNESIUM HYDROXIDE 1200 MG/15ML
LIQUID ORAL AS NEEDED
Status: DISCONTINUED | OUTPATIENT
Start: 2023-04-11 | End: 2023-04-11 | Stop reason: HOSPADM

## 2023-04-11 RX ORDER — FENTANYL CITRATE 50 UG/ML
50 INJECTION, SOLUTION INTRAMUSCULAR; INTRAVENOUS
Status: DISCONTINUED | OUTPATIENT
Start: 2023-04-11 | End: 2023-04-11 | Stop reason: HOSPADM

## 2023-04-11 RX ORDER — OXYCODONE HYDROCHLORIDE AND ACETAMINOPHEN 5; 325 MG/1; MG/1
1 TABLET ORAL EVERY 6 HOURS PRN
Status: DISCONTINUED | OUTPATIENT
Start: 2023-04-11 | End: 2023-04-12

## 2023-04-11 RX ORDER — ROCURONIUM BROMIDE 10 MG/ML
INJECTION, SOLUTION INTRAVENOUS AS NEEDED
Status: DISCONTINUED | OUTPATIENT
Start: 2023-04-11 | End: 2023-04-11 | Stop reason: SURG

## 2023-04-11 RX ORDER — SODIUM CHLORIDE 9 MG/ML
40 INJECTION, SOLUTION INTRAVENOUS AS NEEDED
Status: DISCONTINUED | OUTPATIENT
Start: 2023-04-11 | End: 2023-04-11 | Stop reason: HOSPADM

## 2023-04-11 RX ORDER — SODIUM CHLORIDE 0.9 % (FLUSH) 0.9 %
10 SYRINGE (ML) INJECTION EVERY 12 HOURS SCHEDULED
Status: DISCONTINUED | OUTPATIENT
Start: 2023-04-11 | End: 2023-04-11 | Stop reason: HOSPADM

## 2023-04-11 RX ORDER — AMOXICILLIN 250 MG
1 CAPSULE ORAL NIGHTLY PRN
Status: DISCONTINUED | OUTPATIENT
Start: 2023-04-11 | End: 2023-04-12

## 2023-04-11 RX ORDER — FAMOTIDINE 20 MG/1
20 TABLET, FILM COATED ORAL
Status: COMPLETED | OUTPATIENT
Start: 2023-04-11 | End: 2023-04-11

## 2023-04-11 RX ORDER — ONDANSETRON 2 MG/ML
4 INJECTION INTRAMUSCULAR; INTRAVENOUS EVERY 6 HOURS PRN
Status: DISCONTINUED | OUTPATIENT
Start: 2023-04-11 | End: 2023-04-12

## 2023-04-11 RX ORDER — CEFAZOLIN SODIUM 2 G/100ML
2 INJECTION, SOLUTION INTRAVENOUS ONCE
Status: COMPLETED | OUTPATIENT
Start: 2023-04-11 | End: 2023-04-11

## 2023-04-11 RX ORDER — GINSENG 100 MG
CAPSULE ORAL AS NEEDED
Status: DISCONTINUED | OUTPATIENT
Start: 2023-04-11 | End: 2023-04-11 | Stop reason: HOSPADM

## 2023-04-11 RX ORDER — DOCUSATE SODIUM 100 MG/1
100 CAPSULE, LIQUID FILLED ORAL 2 TIMES DAILY PRN
Status: DISCONTINUED | OUTPATIENT
Start: 2023-04-11 | End: 2023-04-12

## 2023-04-11 RX ORDER — EPHEDRINE SULFATE 50 MG/ML
INJECTION INTRAVENOUS AS NEEDED
Status: DISCONTINUED | OUTPATIENT
Start: 2023-04-11 | End: 2023-04-11 | Stop reason: SURG

## 2023-04-11 RX ORDER — SODIUM CHLORIDE 9 MG/ML
9 INJECTION, SOLUTION INTRAVENOUS CONTINUOUS PRN
Status: DISCONTINUED | OUTPATIENT
Start: 2023-04-11 | End: 2023-04-11 | Stop reason: HOSPADM

## 2023-04-11 RX ORDER — PROPOFOL 10 MG/ML
VIAL (ML) INTRAVENOUS AS NEEDED
Status: DISCONTINUED | OUTPATIENT
Start: 2023-04-11 | End: 2023-04-11 | Stop reason: SURG

## 2023-04-11 RX ORDER — FENTANYL CITRATE 50 UG/ML
INJECTION, SOLUTION INTRAMUSCULAR; INTRAVENOUS AS NEEDED
Status: DISCONTINUED | OUTPATIENT
Start: 2023-04-11 | End: 2023-04-11 | Stop reason: SURG

## 2023-04-11 RX ORDER — NALOXONE HCL 0.4 MG/ML
0.4 VIAL (ML) INJECTION
Status: DISCONTINUED | OUTPATIENT
Start: 2023-04-11 | End: 2023-04-12

## 2023-04-11 RX ORDER — ONDANSETRON 4 MG/1
4 TABLET, FILM COATED ORAL EVERY 6 HOURS PRN
Status: DISCONTINUED | OUTPATIENT
Start: 2023-04-11 | End: 2023-04-11

## 2023-04-11 RX ORDER — LIDOCAINE HYDROCHLORIDE AND EPINEPHRINE 5; 5 MG/ML; UG/ML
INJECTION, SOLUTION INFILTRATION; PERINEURAL AS NEEDED
Status: DISCONTINUED | OUTPATIENT
Start: 2023-04-11 | End: 2023-04-11 | Stop reason: HOSPADM

## 2023-04-11 RX ORDER — DEXAMETHASONE SODIUM PHOSPHATE 10 MG/ML
INJECTION INTRAMUSCULAR; INTRAVENOUS AS NEEDED
Status: DISCONTINUED | OUTPATIENT
Start: 2023-04-11 | End: 2023-04-11 | Stop reason: SURG

## 2023-04-11 RX ORDER — MIDAZOLAM HYDROCHLORIDE 1 MG/ML
1 INJECTION INTRAMUSCULAR; INTRAVENOUS
Status: DISCONTINUED | OUTPATIENT
Start: 2023-04-11 | End: 2023-04-11 | Stop reason: HOSPADM

## 2023-04-11 RX ORDER — LIDOCAINE HYDROCHLORIDE 10 MG/ML
INJECTION, SOLUTION EPIDURAL; INFILTRATION; INTRACAUDAL; PERINEURAL AS NEEDED
Status: DISCONTINUED | OUTPATIENT
Start: 2023-04-11 | End: 2023-04-11 | Stop reason: SURG

## 2023-04-11 RX ADMIN — ACETAMINOPHEN 325MG 650 MG: 325 TABLET ORAL at 18:29

## 2023-04-11 RX ADMIN — PROPOFOL 200 MG: 10 INJECTION, EMULSION INTRAVENOUS at 08:28

## 2023-04-11 RX ADMIN — OXYCODONE HYDROCHLORIDE AND ACETAMINOPHEN 1 TABLET: 5; 325 TABLET ORAL at 16:30

## 2023-04-11 RX ADMIN — CEFAZOLIN SODIUM 2 G: 2 INJECTION, SOLUTION INTRAVENOUS at 08:35

## 2023-04-11 RX ADMIN — HYDROMORPHONE HYDROCHLORIDE 0.5 MG: 1 INJECTION, SOLUTION INTRAMUSCULAR; INTRAVENOUS; SUBCUTANEOUS at 16:30

## 2023-04-11 RX ADMIN — OXYCODONE HYDROCHLORIDE AND ACETAMINOPHEN 1 TABLET: 5; 325 TABLET ORAL at 23:00

## 2023-04-11 RX ADMIN — ROCURONIUM BROMIDE 80 MG: 10 INJECTION INTRAVENOUS at 08:28

## 2023-04-11 RX ADMIN — HYDROMORPHONE HYDROCHLORIDE 0.5 MG: 1 INJECTION, SOLUTION INTRAMUSCULAR; INTRAVENOUS; SUBCUTANEOUS at 20:03

## 2023-04-11 RX ADMIN — FENTANYL CITRATE 100 MCG: 50 INJECTION, SOLUTION INTRAMUSCULAR; INTRAVENOUS at 08:26

## 2023-04-11 RX ADMIN — SUGAMMADEX 200 MG: 100 INJECTION, SOLUTION INTRAVENOUS at 10:03

## 2023-04-11 RX ADMIN — HYDROMORPHONE HYDROCHLORIDE 0.5 MG: 1 INJECTION, SOLUTION INTRAMUSCULAR; INTRAVENOUS; SUBCUTANEOUS at 14:42

## 2023-04-11 RX ADMIN — LEVETIRACETAM 1000 MG: 500 TABLET, FILM COATED ORAL at 20:04

## 2023-04-11 RX ADMIN — SODIUM CHLORIDE: 9 INJECTION, SOLUTION INTRAVENOUS at 08:18

## 2023-04-11 RX ADMIN — TACROLIMUS 1.5 MG: 0.5 CAPSULE ORAL at 20:13

## 2023-04-11 RX ADMIN — ACETAMINOPHEN 325MG 650 MG: 325 TABLET ORAL at 13:47

## 2023-04-11 RX ADMIN — DEXAMETHASONE SODIUM PHOSPHATE 10 MG: 10 INJECTION INTRAMUSCULAR; INTRAVENOUS at 08:45

## 2023-04-11 RX ADMIN — LEVETIRACETAM 1000 MG: 500 TABLET, FILM COATED ORAL at 08:06

## 2023-04-11 RX ADMIN — FAMOTIDINE 20 MG: 20 TABLET, FILM COATED ORAL at 08:06

## 2023-04-11 RX ADMIN — LIDOCAINE HYDROCHLORIDE 50 MG: 10 INJECTION, SOLUTION EPIDURAL; INFILTRATION; INTRACAUDAL; PERINEURAL at 08:28

## 2023-04-11 RX ADMIN — ONDANSETRON 4 MG: 2 INJECTION INTRAMUSCULAR; INTRAVENOUS at 09:44

## 2023-04-11 RX ADMIN — EPHEDRINE SULFATE 10 MG: 50 INJECTION INTRAVENOUS at 09:18

## 2023-04-11 NOTE — ANESTHESIA PREPROCEDURE EVALUATION
Anesthesia Evaluation     Patient summary reviewed and Nursing notes reviewed   no history of anesthetic complications:  NPO Solid Status: > 8 hours  NPO Liquid Status: > 2 hours           Airway   Mallampati: I  TM distance: >3 FB  Neck ROM: full  No difficulty expected  Dental - normal exam     Pulmonary     breath sounds clear to auscultation  Cardiovascular   Exercise tolerance: good (4-7 METS)    ECG reviewed  Rhythm: regular  Rate: normal        Neuro/Psych  (+) seizures poorly controlled,    GI/Hepatic/Renal/Endo    (+)   liver disease,     ROS Comment: S/P LIVER TX    Musculoskeletal     Abdominal   (+) obese,     Abdomen: soft.   Substance History   (+) alcohol use,      OB/GYN          Other      history of cancer active                  Anesthesia Plan    ASA 3     general     intravenous induction     Anesthetic plan, risks, benefits, and alternatives have been provided, discussed and informed consent has been obtained with: patient.        CODE STATUS:    Level Of Support Discussed With: Patient  Code Status (Patient has no pulse and is not breathing): CPR (Attempt to Resuscitate)  Medical Interventions (Patient has pulse or is breathing): Full Support

## 2023-04-11 NOTE — ANESTHESIA POSTPROCEDURE EVALUATION
Patient: Cheo Garrett    Procedure Summary     Date: 04/11/23 Room / Location:  [facility] [address] [place] [address] [facility]    Anesthesia Start: 0825 Anesthesia Stop: 1032    Procedure: CRANIOTOMY WITH BIOPSY (Head) Diagnosis:       Brain mass      (Brain mass [G93.89])    Surgeons: Gonzales Gibbons MD Provider: Livan Mckinley MD    Anesthesia Type: general ASA Status: 3          Anesthesia Type: general    Vitals  No vitals data found for the desired time range.          Post Anesthesia Care and Evaluation    Patient location during evaluation: PACU  Patient participation: complete - patient participated  Level of consciousness: awake and alert  Pain management: adequate    Airway patency: patent  Anesthetic complications: No anesthetic complications  PONV Status: none  Cardiovascular status: hemodynamically stable and acceptable  Respiratory status: nonlabored ventilation, acceptable and nasal cannula  Hydration status: acceptable

## 2023-04-11 NOTE — PLAN OF CARE
Goal Outcome Evaluation:    Neuro status WNL.     Left head incision. Clean/dry/intact.     Plan to discharge tomorrow.

## 2023-04-11 NOTE — PLAN OF CARE
"Goal Outcome Evaluation:  Plan of Care Reviewed With: patient, spouse        Progress: improving  Outcome Evaluation: A&O x4, sometimes forgetful (short-term). Up ad larisa, frequently ambulated in hallway w/ spouse & tolerated activity well. VSS. NSR. Pt denies c/o's of pain, states he is \"full of energy but very bored\", states he wants to get the brain biopsy done and move on. Wife here all day, supportive, participating in care, tearful at times re: diagnosis/prognosis/unknown, emotional support offered & she voices appreciation. Plan biopsy for tomorrow. Discharge plan pending diagnosis and outcome/POC.  "

## 2023-04-11 NOTE — PROGRESS NOTES
INFECTIOUS DISEASE PROGRESS NOTE    Cheo Garrett  1975  [id number]    Date of Consult: 4/8/2023    Admission Date: 4/7/2023      Requesting Provider: Roxanne Iverson DO  Evaluating Physician: Duncan Pradhan MD    Reason for Consultation: immunocompromised, brain lesion    History of present illness:    4/8/23: Patient is a 47 y.o. male with h/o liver cancer and liver transplantation 2013 at [facility]/on Tacrolimus who we were asked to see for possible infectious brain lesion.  The patient was brought to [facility] ED on 4/7 by EMS after his wife witness the patient having a seizure at her family's home. They are from [place], but are visiting family near [place], [place].   The patient has no h/o seizures and has been relatively healthy since his liver transplantation.   He states he does no remember leading up to his being taken to [facility].  On arrival, the patient is afebrile.  Admitting labs are WBC 18,200 with 86% neutrophils, lactic acid 6.4, PCT 0.03, Creatinine 1.08, ammonia 49, and UA WBC 0-2.  A UDS was negative.  A respiratory panel PCR was negative.  Blood cultures are negative to date. A CXR showed no acute findings.  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  He underwent an LP yesterday with WBC CSF zero, glucose 88, and protein 30.7. A Meningitis/encephalitis panel PCR was negative.  A toxoplasma PCR from CSF is pending.  A CSF culture is negative to date. He is currently on no antibiotics. ID was asked to evaluate.  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     4/9/23:He denies headache, earache, sore throat.  He has remained afebrile.  He has had no further seizures.His white blood cell count today is 9.7.  His creatinine is 0.8.  Serum cryptococcal antigen was negative.  He denies nausea, vomiting, diarrhea.    4/10/23: He has remained afebrile. CSF and serum  cryptococcal antigens were negative. Toxoplasma serology is pending.  QuantiFERON Gold TB test is pending.  Urinary histoplasma Blastomyces antigens are pending. Brain biopsy is scheduled for tomorrow.  He complains of a mild headache.  Denies nausea and vomiting.    4/11/23: He has remained afebrile.  Toxoplasma serology is negative. Toxoplasma CSF PCR is pending. QuantiFERON Gold TB test is pending. 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        Past Medical History:   Diagnosis Date   • Liver cancer        History reviewed. No pertinent surgical history.   Liver transplantation 2013    History reviewed. No pertinent family history.    Social History     Socioeconomic History   • Marital status:    Denies tobacco, alcohol, or illicit drug use.   Lives in    No Known Allergies      Medication:    Current Facility-Administered Medications:   •  acetaminophen (TYLENOL) tablet 650 mg, 650 mg, Oral, Q4H PRN, Roxanne Iverson DO, 650 mg at 04/10/23 2043  •  Calcium Replacement - Follow Nurse / BPA Driven Protocol, , Does not apply, PRN, Roxanne Iverson DO  •  levETIRAcetam (KEPPRA) tablet 1,000 mg, 1,000 mg, Oral, Q12H, Alyssa Galloway MD, 1,000 mg at 04/10/23 2043  •  LORazepam (ATIVAN) injection 2 mg, 2 mg, Intravenous, Q5 Min PRN, Alyssa Galloway MD  •  Magnesium Standard Dose Replacement - Follow Nurse / BPA Driven Protocol, , Does not apply, PRN, Roxanne Iverson, DO  •  ondansetron (ZOFRAN) injection 4 mg, 4 mg, Intravenous, Q6H PRN, Roxanne Iverson DO  •  Phosphorus Replacement - Follow Nurse / BPA Driven Protocol, , Does not apply, PRN, Roxanne Iverson DO  •  Potassium Replacement - Follow Nurse / BPA Driven Protocol, , Does not apply, PRN, Roxanne Iverson, DO  •  sodium chloride 0.9 % flush 10 mL, 10 mL, Intravenous, PRN, Roxanne Iverson DO, 10 mL at 04/07/23 1913  •  sodium chloride 0.9 % flush 10  mL, 10 mL, Intravenous, Q12H, Roxanne Iverson R, DO, 10 mL at 04/10/23 2044  •  sodium chloride 0.9 % flush 10 mL, 10 mL, Intravenous, PRN, Roxanne Iverson R, DO  •  sodium chloride 0.9 % infusion 40 mL, 40 mL, Intravenous, PRN, Roxanne Iverson R, DO  •  tacrolimus (PROGRAF) capsule 1.5 mg, 1.5 mg, Oral, Q12H, Roxanne Iverson R, DO, 1.5 mg at 04/10/23 2146    Antibiotics:  Anti-Infectives (From admission, onward)    None            Review of Systems:  See above      Physical Exam:   Vital Signs  Temp (24hrs), Av °F (36.7 °C), Min:97.3 °F (36.3 °C), Max:98.6 °F (37 °C)    Temp  Min: 97.3 °F (36.3 °C)  Max: 98.6 °F (37 °C)  BP  Min: 115/77  Max: 131/75  Pulse  Min: 63  Max: 114  Resp  Min: 16  Max: 20  SpO2  Min: 95 %  Max: 99 %    GENERAL: Awake and alert, in no acute distress.   HEENT: Normocephalic, atraumatic.  PERRL. EOMI. No conjunctival injection. No icterus. Oropharynx clear without evidence of thrush or exudate.  NECK: Supple  LYMPH: No cervical, axillary or inguinal lymphadenopathy.  HEART: RRR; No murmur, rubs, gallops.   LUNGS: Clear to auscultation bilaterally without wheezing, rales, rhonchi. Normal respiratory effort. Nonlabored.  ABDOMEN: Soft, nontender, nondistended.. No rebound or guarding. NO mass or HSM  :  Without Bailey catheter.  MSK: No joint effusions or erythema  SKIN: Warm and dry without cutaneous eruptions on Inspection/palpation.    NEURO: Oriented to PPT.  Motor 5/5 strength  PSYCHIATRIC: Normal insight and judgment. Cooperative with PE    Laboratory Data    Results from last 7 days   Lab Units 23  0640 23  0538 23  1053   WBC 10*3/mm3 9.66 9.94 18.22*   HEMOGLOBIN g/dL 15.3 17.2 16.6   HEMATOCRIT % 43.9 47.4 46.4   PLATELETS 10*3/mm3 145 168 161     Results from last 7 days   Lab Units 23  0640   SODIUM mmol/L 144   POTASSIUM mmol/L 3.7   CHLORIDE mmol/L 107   CO2 mmol/L 26.0   BUN mg/dL 16   CREATININE mg/dL 0.80   GLUCOSE mg/dL 97   CALCIUM mg/dL 9.3      Results from last 7 days   Lab Units 04/07/23  1053   ALK PHOS U/L 48   BILIRUBIN mg/dL 0.5   ALT (SGPT) U/L 27   AST (SGOT) U/L 24             Results from last 7 days   Lab Units 04/07/23  1826   LACTATE mmol/L 1.7             Estimated Creatinine Clearance: 157.8 mL/min (by C-G formula based on SCr of 0.8 mg/dL).      Microbiology:  Microbiology Results (last 10 days)     Procedure Component Value - Date/Time    Meningitis / Encephalitis Panel, PCR - Cerebrospinal Fluid, Lumbar Puncture [[id number]]  (Normal) Collected: 04/07/23 1418    Lab Status: Final result Specimen: Cerebrospinal Fluid from Lumbar Puncture Updated: 04/07/23 1553     ESCHERICHIA COLI K1, PCR Not Detected     HAEMOPHILUS INFLUENZAE, PCR Not Detected     LISTERIA MONOCYTOGENES, PCR Not Detected     NEISSERIA MENINGITIDIS, PCR Not Detected     STREPTOCOCCUS AGALACTIAE, PCR Not Detected     STREPTOCOCCUS PNEUMONIAE, PCR Not Detected     CYTOMEGALOVIRUS (CMV), PCR Not Detected     ENTEROVIRUS, PCR Not Detected     HERPES SIMPLEX VIRUS 1 (HSV-1), PCR Not Detected     HERPES SIMPLEX VIRUS 2 (HSV-2), PCR Not Detected     HUMAN PARECHOVIRUS, PCR Not Detected     VARICELLA ZOSTER VIRUS (VZV), PCR Not Detected     CRYPTOCOCCUS NEOFORMANS / GATTII, PCR Not Detected     HUMAN HERPES VIRUS 6 PCR Not Detected    Culture, CSF - Cerebrospinal Fluid, Lumbar Puncture [[id number]] Collected: 04/07/23 1304    Lab Status: Final result Specimen: Cerebrospinal Fluid from Lumbar Puncture Updated: 04/10/23 0652     CSF Culture No growth at 3 days     Gram Stain No WBCs or organisms seen    Cryptococcal AG, CSF - Cerebrospinal Fluid, Lumbar Puncture [[id number]]  (Normal) Collected: 04/07/23 1304    Lab Status: Final result Specimen: Cerebrospinal Fluid from Lumbar Puncture Updated: 04/09/23 0734     Cryptococcal Antigen, CSF Negative    Blood Culture - Blood, Hand, Right [[id number]]  (Normal) Collected: 04/07/23 1243    Lab Status: Preliminary result Specimen: Blood  from Hand, Right Updated: 04/10/23 1300     Blood Culture No growth at 3 days    Blood Culture - Blood, Hand, Left [[id number]]  (Normal) Collected: 04/07/23 1243    Lab Status: Preliminary result Specimen: Blood from Hand, Left Updated: 04/10/23 1300     Blood Culture No growth at 3 days    Respiratory Panel PCR w/COVID-19(SARS-CoV-2) JESSY/TAMMY/ULISES/PAD/COR/MAD/WALESKA In-House, NP Swab in UTM/VTM, 3-4 HR TAT - Swab, Nasopharynx [[id number]]  (Normal) Collected: 04/07/23 1227    Lab Status: Final result Specimen: Swab from Nasopharynx Updated: 04/07/23 1349     ADENOVIRUS, PCR Not Detected     Coronavirus 229E Not Detected     Coronavirus HKU1 Not Detected     Coronavirus NL63 Not Detected     Coronavirus OC43 Not Detected     COVID19 Not Detected     Human Metapneumovirus Not Detected     Human Rhinovirus/Enterovirus Not Detected     Influenza A PCR Not Detected     Influenza B PCR Not Detected     Parainfluenza Virus 1 Not Detected     Parainfluenza Virus 2 Not Detected     Parainfluenza Virus 3 Not Detected     Parainfluenza Virus 4 Not Detected     RSV, PCR Not Detected     Bordetella pertussis pcr Not Detected     Bordetella parapertussis PCR Not Detected     Chlamydophila pneumoniae PCR Not Detected     Mycoplasma pneumo by PCR Not Detected    Narrative:      In the setting of a positive respiratory panel with a viral infection PLUS a negative procalcitonin without other underlying concern for bacterial infection, consider observing off antibiotics or discontinuation of antibiotics and continue supportive care. If the respiratory panel is positive for atypical bacterial infection (Bordetella pertussis, Chlamydophila pneumoniae, or Mycoplasma pneumoniae), consider antibiotic de-escalation to target atypical bacterial infection.                Radiology:  Imaging Results (Last 72 Hours)     Procedure Component Value Units Date/Time    CT Chest With & Without Contrast Diagnostic [[id number]] Collected: 04/08/23 2031      Updated: 04/08/23 2055    Narrative:      CT CHEST W WO CONTRAST DIAGNOSTIC, CT ABDOMEN PELVIS W WO CONTRAST    Date of Exam: 4/8/2023 7:08 PM EDT    Indication: Brain mass, evaluate for malignancy, metastatic workup    Comparison: CT head without contrast, MRI brain 4/7/2023    Technique: Axial CT images were obtained of the chest , abdomen, and pelvis before and after the uneventful intravenous administration of 100 mL Isovue 300.  Reconstructed coronal and sagittal images were also obtained. Automated exposure control and   iterative construction methods were used.    Findings:  Chest:  Thoracic aortic arch branch vasculature is patent. Heart size is normal. Negative for pericardial effusion. No pulmonary embolus. Negative for mediastinal, hilar, or axillary adenopathy. Bilateral gynecomastia noted.    Trachea and mainstem bronchi are patent. Lungs without consolidation. No pneumothorax. Mild linear atelectasis at the left lower lobe. No suspicious pulmonary nodule or mass. Thoracic vertebral bodies are maintained in height. Negative for aggressive   osseous lesion or fracture. Mild wall thickening of distal esophagus which may relate to reflux or esophagitis.    Abdomen and pelvis:  Initial noncontrast imaging demonstrates no radiodense renal calculus. No ureteral calculus. Subsequently post contrast images were obtained. Post surgical changes of prior hepatic transplant. No liver lesion. The spleen is normal in size. Adrenal glands   are normal. Pancreas without evidence of pancreatitis. Is no pancreatic mass. Gallbladder absent. No biliary dilatation.     Kidneys are symmetrically enhancing. There is no enhancing or suspicious renal mass. No hydronephrosis or hydroureter. Urinary bladder is thin-walled. Prostate normal in size. Delayed images demonstrate normal contrast excretion into the nondilated   ureters.    Negative for pneumoperitoneum. No bowel obstruction. No fluid collection. The appendix is normal.  Fluid-filled distention of the stomach. The portal vein and hepatic veins are patent. The splenic vein and superior mesenteric vein are patent. There are   prominent venous collaterals in the left upper quadrant extending into the left mid and lower abdomen adjacent to the infrarenal aorta which may relate to sequela of prior portal hypertension given history of liver transplant. Negative for central   mesenteric or retroperitoneal adenopathy. No aggressive osseous lesion or acute fracture. Disc narrowing lower lumbar spine at L5-S1.      Impression:      Impression:  1. No findings of primary malignancy or metastatic adenopathy in the chest, abdomen, or pelvis.  2. Post surgical changes of liver transplant.  3. Wall thickening at mid and distal esophagus, correlate for findings of reflux/esophagitis.  4. Additional incidental chronic findings above.    Electronically Signed: Juan Chandler    4/8/2023 8:52 PM EDT    Workstation ID: [id number]    CT Abdomen Pelvis With & Without Contrast [[id number]] Collected: 04/08/23 2031     Updated: 04/08/23 2055    Narrative:      CT CHEST W WO CONTRAST DIAGNOSTIC, CT ABDOMEN PELVIS W WO CONTRAST    Date of Exam: 4/8/2023 7:08 PM EDT    Indication: Brain mass, evaluate for malignancy, metastatic workup    Comparison: CT head without contrast, MRI brain 4/7/2023    Technique: Axial CT images were obtained of the chest , abdomen, and pelvis before and after the uneventful intravenous administration of 100 mL Isovue 300.  Reconstructed coronal and sagittal images were also obtained. Automated exposure control and   iterative construction methods were used.    Findings:  Chest:  Thoracic aortic arch branch vasculature is patent. Heart size is normal. Negative for pericardial effusion. No pulmonary embolus. Negative for mediastinal, hilar, or axillary adenopathy. Bilateral gynecomastia noted.    Trachea and mainstem bronchi are patent. Lungs without consolidation. No pneumothorax. Mild  linear atelectasis at the left lower lobe. No suspicious pulmonary nodule or mass. Thoracic vertebral bodies are maintained in height. Negative for aggressive   osseous lesion or fracture. Mild wall thickening of distal esophagus which may relate to reflux or esophagitis.    Abdomen and pelvis:  Initial noncontrast imaging demonstrates no radiodense renal calculus. No ureteral calculus. Subsequently post contrast images were obtained. Post surgical changes of prior hepatic transplant. No liver lesion. The spleen is normal in size. Adrenal glands   are normal. Pancreas without evidence of pancreatitis. Is no pancreatic mass. Gallbladder absent. No biliary dilatation.     Kidneys are symmetrically enhancing. There is no enhancing or suspicious renal mass. No hydronephrosis or hydroureter. Urinary bladder is thin-walled. Prostate normal in size. Delayed images demonstrate normal contrast excretion into the nondilated   ureters.    Negative for pneumoperitoneum. No bowel obstruction. No fluid collection. The appendix is normal. Fluid-filled distention of the stomach. The portal vein and hepatic veins are patent. The splenic vein and superior mesenteric vein are patent. There are   prominent venous collaterals in the left upper quadrant extending into the left mid and lower abdomen adjacent to the infrarenal aorta which may relate to sequela of prior portal hypertension given history of liver transplant. Negative for central   mesenteric or retroperitoneal adenopathy. No aggressive osseous lesion or acute fracture. Disc narrowing lower lumbar spine at L5-S1.      Impression:      Impression:  1. No findings of primary malignancy or metastatic adenopathy in the chest, abdomen, or pelvis.  2. Post surgical changes of liver transplant.  3. Wall thickening at mid and distal esophagus, correlate for findings of reflux/esophagitis.  4. Additional incidental chronic findings above.    Electronically Signed: Juan Chandler     4/8/2023 8:52 PM EDT    Workstation ID: [id number]      I read his radiographic images.  I reviewed the images with the patient and his wife yesterday.      Impression:   1. 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  2. New onset of seizure.  On Keppra  3. Leukocytosis/neutrophilia, stress vs infection  4. Lactic acidosis, stress vs other  5. H/o Liver cancer s/p liver transplant 2013 on Tacrolimus.  6. Immunocompromised host.    PLAN/RECOMMENDATIONS:   1.  Brain biopsy pathology-pending  2.  Oncology consultation-he may wish to have this performed closer to home    I discussed his complex situation with Dr. Gibbons and Dr. Briseno  I discussed his complex situation with his wife and the patient himself today.    Duncan Pradhan MD  4/11/2023  06:11 EDT

## 2023-04-11 NOTE — NURSING NOTE
Received report from Franco, patient has no neuro defects noted upon exam, strong hand grasp, able to lift legs off bed, oriented x 4, tongue midline, no drift noted with arms, able to shrug shoulders, PERRL.

## 2023-04-11 NOTE — PROGRESS NOTES
Clemencia Sales, APRN   APRN NOTE    Cheo Garrett is a 47 y.o. male with PMHx significant only for liver cancer s/p liver transplant at [facility] in 2013 on chronic immunosuppressant therapy who presents to [facility] ED on 04/07/2023 for evaluation after a witnessed seizure at home. Per report, the patient's wife noted confusion and increased somnolence the week leading up to his generalized seizure when wife called EMS. By the time they arrived, his seizure resolved but remained confused. Upon ED admission, labs concerning for meningitis; however, CSF studies benign. He was admitted to Hospital Medicine, started on Keppra, and MRI brain obtained, demonstrating a 2.2-cm enhancing and partially cystic mass of the left basal ganglia with surrounding edema extending toward the insular and left temporal region, favoring primary glial neoplasm. Dr. Gibbons with neurosurgery was consulted, recommending biopsy.     He is seen in the ICU post-operatively.       Split share visit.  APRN Time: I spent 5 minutes.    INTENSIVIST   PROGRESS NOTE        SUBJECTIVE     Cheo 47 y.o. male is followed for: Altered Mental Status       G93.89, Brain Bx 04/11/23    Seizures    Liver transplanted    Immunosuppressed status    As an Intensivist, we provide an integrated approach to the ICU patient and family, medical management of comorbid conditions, including but not limited to electrolytes, glycemic control, organ dysfunction, lead interdisciplinary rounds and coordinate the care with all other services, including those from other specialists.     Interval History:  POD: Day of Surgery    Doing well post.  Some headaches.  No chest pain.  No respiratory distress.    Temp  Min: 97 °F (36.1 °C)  Max: 98.6 °F (37 °C)       History     Last Reviewed by Donis Briseno MD on 4/11/2023 at  3:25 PM    Sections Reviewed    Medical, Family, Tobacco, Alcohol, Drug Use, Sexual Activity, Social   Documentation      Medicines reviewed by Donis Briseno,  MD on 2023 at  3:24 PM  Allergies reviewed by Donis Briseno MD on 2023 at  3:24 PM       The patient's relevant past medical, surgical and social history were reviewed and updated in Epic as appropriate.        OBJECTIVE     Vitals:  Temp: 97.7 °F (36.5 °C) (23 1600) Temp  Min: 97 °F (36.1 °C)  Max: 98.6 °F (37 °C)   Temp core:      BP: 122/81 (23 1600) BP  Min: 115/77  Max: 148/96   MAP (non-invasive) Noninvasive MAP (mmHg): 96 (23) Noninvasive MAP (mmHg)  Av.5  Min: 84  Max: 112   Pulse: 76 (23) Pulse  Min: 63  Max: 114   Resp: 16 (23) Resp  Min: 14  Max: 20   SpO2: 95 % (23) SpO2  Min: 95 %  Max: 100 %   Device: room air (23 1600)    Flow Rate: 2 (23 1200) Flow (L/min)  Min: 2  Max: 4         23  1115 23  1617   Weight: 111 kg (245 lb) 109 kg (239 lb 12.8 oz)        Intake/Ouptut 24 hrs (7:00AM - 6:59 AM)  Intake & Output (last 3 days)        0701   0700  0701  04/10 0700 04/10 0701   0700  0701   0700    P.O.   240     I.V. (mL/kg)        Total Intake(mL/kg)   240 (2.2)     Urine (mL/kg/hr)    650 (0.6)    Total Output    650    Net   +240 -650            Urine Unmeasured Occurrence   2 x           Medications (drips):  niCARdipine, Last Rate: Stopped (23 1305)      Physical Examination  Telemetry:  Rhythm: normal sinus rhythm (23 1600)         Constitutional:  No acute distress.   Cardiovascular: RRR.    Respiratory: Normal breath sounds  No adventitious sounds   Abdominal:  Soft with no tenderness.   Extremities: No Edema   Neurological:   Alert, Oriented, Cooperative.  Best Eye Response: 4-->(E4) spontaneous (23)  Best Motor Response: 6-->(M6) obeys commands (23)  Best Verbal Response: 5-->(V5) oriented (23)  Jocelin Coma Scale Score: 15 (23)     Total (NIH Stroke Scale): 0 (23 1235)       Results  Reviewed:  Laboratory  Microbiology  Radiology  Pathology    Hematology:  Results from last 7 days   Lab Units 04/09/23  0640 04/08/23  0538 04/07/23  1053   WBC 10*3/mm3 9.66 9.94 18.22*   NEUTROS ABS 10*3/mm3 5.92 8.20* 15.73*   IMM GRAN % % 0.9* 0.4 0.5   LYMPHS ABS 10*3/mm3 2.39 1.30 0.55*   MONOS ABS 10*3/mm3 1.15* 0.38 1.79*   EOS ABS 10*3/mm3 0.05 0.00 0.01   BASOS ABS 10*3/mm3 0.06 0.02 0.04   HEMOGLOBIN g/dL 15.3 17.2 16.6   MCV fL 86.6 85.3 86.7   RDW % 12.1* 11.8* 11.9*   PLATELETS 10*3/mm3 145 168 161       Chemistry:  Estimated Creatinine Clearance: 157.8 mL/min (by C-G formula based on SCr of 0.8 mg/dL).    Results from last 7 days   Lab Units 04/09/23  0640 04/08/23  0538   SODIUM mmol/L 144 140   POTASSIUM mmol/L 3.7 4.0   CHLORIDE mmol/L 107 105   CO2 mmol/L 26.0 23.0   BUN mg/dL 16 13   CREATININE mg/dL 0.80 0.82   GLUCOSE mg/dL 97 153*     Results from last 7 days   Lab Units 04/09/23  0640 04/08/23  0538 04/07/23  1053   CALCIUM mg/dL 9.3 9.4 8.8   MAGNESIUM mg/dL  --   --  1.9     COVID-19  Lab Results   Component Value Date    COVID19 Not Detected 04/07/2023       Images:  EEG    Result Date: 4/7/2023  Diffuse cerebral dysfunction of mild degree but nonspecific No ongoing seizures are seen This report is transcribed using the Dragon dictation system.      CT Abdomen Pelvis With & Without Contrast    Result Date: 4/8/2023  Impression: 1. No findings of primary malignancy or metastatic adenopathy in the chest, abdomen, or pelvis. 2. Post surgical changes of liver transplant. 3. Wall thickening at mid and distal esophagus, correlate for findings of reflux/esophagitis. 4. Additional incidental chronic findings above. Electronically Signed: Juan Chandler  4/8/2023 8:52 PM EDT  Workstation ID: [id number]    CT Head Without Contrast    Result Date: 4/11/2023  Impression: Postoperative changes of left basal ganglia lesion biopsy with few foci of gas seen at the biopsy site as well as a small amount of  pneumocephalus. No evidence of acute intracranial abnormality otherwise. Electronically Signed: Andrew Augustin  4/11/2023 12:54 PM EDT  Workstation ID: [id number]    CT Head Without Contrast    Result Date: 4/7/2023  Impression: Faint hypodensity is seen within the left basal ganglia, likely either chronic lacunar infarct or prominent perivascular space. No acute intracranial abnormality otherwise. Electronically Signed: Christiano Nino  4/7/2023 12:15 PM EDT  Workstation ID: [id number]    CT Chest With & Without Contrast Diagnostic    Result Date: 4/8/2023  Impression: 1. No findings of primary malignancy or metastatic adenopathy in the chest, abdomen, or pelvis. 2. Post surgical changes of liver transplant. 3. Wall thickening at mid and distal esophagus, correlate for findings of reflux/esophagitis. 4. Additional incidental chronic findings above. Electronically Signed: Juan Chandler  4/8/2023 8:52 PM EDT  Workstation ID: [id number]    MRI Brain With & Without Contrast    Result Date: 4/7/2023  Impression: 2.2 cm enhancing and partially cystic mass of the left basal ganglia as above, with surrounding edema extending towards the insular and left temporal regions, favoring primary glial neoplasm over metastasis. Recommend neurosurgical consultation. Electronically Signed: Christiano Nino  4/7/2023 4:32 PM EDT  Workstation ID: [id number]    XR Chest 1 View    Result Date: 4/7/2023  No evidence of acute disease in the chest. Electronically Signed: Christiano Nino  4/7/2023 11:31 AM EDT  Workstation ID: [id number]      Results: Reviewed.  I reviewed the patient's new laboratory and imaging results.  I independently reviewed the patient's new images.    Medications: Reviewed.    Assessment   A/P     Hospital:  LOS: 4 days   ICU: 4h      Diagnosis   • **G93.89, Brain Bx 04/11/23 [G93.89]   • Seizures [R56.9]   • Liver transplanted [Z94.4]   • Immunosuppressed status [D84.9]     Cheo is a 47 y.o. male admitted on 4/7/2023  for evaluation  after a witnessed seizure at home.     Per report, the patient's wife noted confusion and increased somnolence the week leading up to his generalized seizure when wife called EMS. By the time they arrived, his seizure resolved but remained confused. Upon ED admission, his CSF studies were negative for an infection. He was started on Keppra, and MRI brain obtained, demonstrating a 2.2-cm enhancing and partially cystic mass of the left basal ganglia with surrounding edema extending toward the insular and left temporal region, favoring primary glial neoplasm. Dr. Gibbons with neurosurgery was consulted, recommending biopsy.     Assessment/Management/Treatment Plan:    1. Brain: 2.2 cm enhancing and partially cystic mas of the left basal ganglia with surrounding edema.  S/P Brain Bx 04/11/23   Dr. Gibbons  Frozen: High-grade glial neoplasm    2. Neuro  a. New onset seizure  3. GI/Hepatology  a. S/P Liver Transplant secondary to Liver cancer, 2013 - Immunosuppressive therapy with Tacrolimus.  4. Endocrine  a. Body mass index is 29.2 kg/m². Overweight: 25.0-29.9kg/m2   b. No history of T2 Diabetes    No results found for: HGBA1C     Results from last 7 days   Lab Units 04/07/23  1732   GLUCOSE mg/dL 83       Diet: Diet: Regular/House Diet; Texture: Regular Texture (IDDSI 7); Fluid Consistency: Thin (IDDSI 0)  Orders Placed This Encounter      DIET MESSAGE Please send cheese pizza,  salad with blue cheese, iced tea, and vanilla ice cream. Thank you :)      Advance Directives: Code Status and Medical Interventions:   Ordered at: 04/11/23 1215     Level Of Support Discussed With:    Patient     Code Status (Patient has no pulse and is not breathing):    CPR (Attempt to Resuscitate)     Medical Interventions (Patient has pulse or is breathing):    Full        DVT prophylaxis:  Mechanical DVT prophylaxis orders are present.     In brief:  1. Discussed with Dr. PEYTON Gibbons (Neurosurgery)  2. Discussed with  Dr. Duncan RUIZ  Lorne (Infectious Disease)  3. Discussed with patient and family. They live in [place], [place].  1. They need to establish their Oncology Care, nearby where they live or at [facility]) where he got his liver transplant.  4. Obtain a CD-ROM with all his images for them to take to their physician at home.  5. Disposition: Admit to ICU   1. Probably discharge home tomorrow.    Plan of care and goals reviewed during interdisciplinary rounds.  I discussed the patient's findings and my recommendations with patient, family, nursing staff and consulting provider    MDM:    Problem(s) High due to: Acute or Chronic illness or injury that may poses a threat to life or bodily function  Data: High due to: Review of prior external records from each unique source, Review of the result(s) of each unique test and Discuss management or test interpretation with external physician or NPP (not separately reported)     High      [x] Primary Attending Intensive Care Medicine - Nutrition Support   [] Consultant    Copied text in this note has been reviewed and is accurate as of 04/11/23

## 2023-04-11 NOTE — OP NOTE
Preoperative diagnosis: Brain mass, seizures, status post liver transplant, chronic immunosuppression  Postoperative diagnosis: Same    Procedures performed:  1.  Left frontal brain biopsy  2.  Use of intraoperative navigation for intradural target    Estimated blood loss: Less than 5 cc    Findings, frozen sections consistent with high-grade glial neoplasm    Procedure in detail: The patient was identified in the preoperative holding area and brought to the operating suite where he underwent the uneventful induction of general, endotracheal anesthesia.  Venodyne's were placed by the nursing staff.  The bed was rotated 90 degrees from anesthesia.  The patient's head was placed in the [place] head morrison with 3 points of fixation.  The patient's face was registered with the navigation system.  The entry point overlying the preplanned surgical corridor was marked and the overlying skin was prepped, and draped in the usual, sterile fashion.  A timeout was performed.  Intravenous antibiotics were ministered.  The skin was anesthetized and a longitudinally oriented skin incision was made using a #15 blade.  Hemostasis was achieved using bipolar and monopolar electrocautery.  A self-retaining retractor was placed.  The Bovie was used to remove the soft tissue from the outer table of the skull.  A bur hole was placed using the .  Bony bleeding was controlled using bone wax.  The dura was coagulated and opened in a cruciate fashion.  The dural leaflets were coagulated back using the bipolar.  The needle biopsy kit was affixed to the skull.  The trajectory was confirmed and locked into place.  Under stereotactic guidance, the blunt needle was passed into the tumor.  Several small specimens were taken and passed off the field to pathology for frozen sections.    Frozen sections were consistent with a high-grade glial neoplasm.    Additional specimens were obtained and the biopsy needle was removed.  A bur hole  cover was placed over the bur hole and the galea and skin were closed in a single layer.  Glue and a sterile dressing were then applied.    Sponge, instrument, and needle counts were all correct at the conclusion of the case.    Emiliano Hightower, physician assistant was responsible for performing the following activities: Retraction, Suction, Irrigation, Suturing and Closing and their skilled assistance was necessary for the success of this case.

## 2023-04-11 NOTE — PROGRESS NOTES
Chief complaint: Brain mass    Admit Diagnosis:   Seizure [R56.9]  Seizures [R56.9]     Subjective: No events overnight    Objective:    Vitals:    23 0725   BP: 130/83   Pulse: 72   Resp: 18   Temp: 97.7 °F (36.5 °C)   SpO2: 100%     Pulse  Av.4  Min: 63  Max: 114  Systolic (24hrs), Av , Min:115 , Max:131     Diastolic (24hrs), Av, Min:75, Max:91    Temp (24hrs), Av °F (36.7 °C), Min:97.3 °F (36.3 °C), Max:98.6 °F (37 °C)      Awake, alert, pleasant, conversant, and appropriate    Lab Results   Component Value Date     2023       A/P:   Admit Diagnosis:   Seizure [R56.9]  Seizures [R56.9]     Informed consent was once again obtained for a stereotactic brain biopsy.  All questions were answered.  No guarantees were given or implied.  The patient consents to proceed with surgery

## 2023-04-11 NOTE — ANESTHESIA PROCEDURE NOTES
Airway  Urgency: elective    Date/Time: 4/11/2023 8:31 AM  Airway not difficult    General Information and Staff    Patient location during procedure: OR  Anesthesiologist: Livan Mckinley MD    Indications and Patient Condition  Indications for airway management: airway protection    Preoxygenated: yes  MILS not maintained throughout  Mask difficulty assessment: 1 - vent by mask    Final Airway Details  Final airway type: endotracheal airway      Successful airway: ETT  Cuffed: yes   Successful intubation technique: direct laryngoscopy  Endotracheal tube insertion site: oral  Blade: Maral  Blade size: 3  ETT size (mm): 7.5  Cormack-Lehane Classification: grade IIb - view of arytenoids or posterior of glottis only  Placement verified by: chest auscultation and capnometry   Measured from: lips  ETT/EBT  to lips (cm): 20  Number of attempts at approach: 1  Assessment: lips, teeth, and gum same as pre-op and atraumatic intubation    Additional Comments  Negative epigastric sounds, Breath sound equal bilaterally with symmetric chest rise and fall

## 2023-04-12 ENCOUNTER — READMISSION MANAGEMENT (OUTPATIENT)
Dept: CALL CENTER | Facility: HOSPITAL | Age: 48
End: 2023-04-12
Payer: COMMERCIAL

## 2023-04-12 VITALS
HEIGHT: 76 IN | HEART RATE: 105 BPM | OXYGEN SATURATION: 97 % | DIASTOLIC BLOOD PRESSURE: 84 MMHG | RESPIRATION RATE: 18 BRPM | TEMPERATURE: 97.2 F | BODY MASS INDEX: 29.2 KG/M2 | SYSTOLIC BLOOD PRESSURE: 131 MMHG | WEIGHT: 239.8 LBS

## 2023-04-12 LAB
ANION GAP SERPL CALCULATED.3IONS-SCNC: 7 MMOL/L (ref 5–15)
BACTERIA SPEC AEROBE CULT: NORMAL
BACTERIA SPEC AEROBE CULT: NORMAL
BASOPHILS # BLD AUTO: 0.02 10*3/MM3 (ref 0–0.2)
BASOPHILS NFR BLD AUTO: 0.2 % (ref 0–1.5)
BLASTOMYCES AG UR IA-MCNC: NORMAL NG/ML
BUN SERPL-MCNC: 16 MG/DL (ref 6–20)
BUN/CREAT SERPL: 22.9 (ref 7–25)
CALCIUM SPEC-SCNC: 8.7 MG/DL (ref 8.6–10.5)
CHLORIDE SERPL-SCNC: 107 MMOL/L (ref 98–107)
CO2 SERPL-SCNC: 27 MMOL/L (ref 22–29)
CREAT SERPL-MCNC: 0.7 MG/DL (ref 0.76–1.27)
DEPRECATED RDW RBC AUTO: 40.7 FL (ref 37–54)
EGFRCR SERPLBLD CKD-EPI 2021: 115.8 ML/MIN/1.73
EOSINOPHIL # BLD AUTO: 0.06 10*3/MM3 (ref 0–0.4)
EOSINOPHIL NFR BLD AUTO: 0.5 % (ref 0.3–6.2)
ERYTHROCYTE [DISTWIDTH] IN BLOOD BY AUTOMATED COUNT: 12.6 % (ref 12.3–15.4)
GAMMA INTERFERON BACKGROUND BLD IA-ACNC: 0.02 IU/ML
GLUCOSE SERPL-MCNC: 132 MG/DL (ref 65–99)
H CAPSUL AG UR QL IA: <0.5
HCT VFR BLD AUTO: 43.3 % (ref 37.5–51)
HGB BLD-MCNC: 14.9 G/DL (ref 13–17.7)
IMM GRANULOCYTES # BLD AUTO: 0.05 10*3/MM3 (ref 0–0.05)
IMM GRANULOCYTES NFR BLD AUTO: 0.4 % (ref 0–0.5)
LYMPHOCYTES # BLD AUTO: 1.65 10*3/MM3 (ref 0.7–3.1)
LYMPHOCYTES NFR BLD AUTO: 12.8 % (ref 19.6–45.3)
M TB IFN-G BLD-IMP: NEGATIVE
M TB IFN-G CD4+ T-CELLS BLD-ACNC: 0.02 IU/ML
M TBIFN-G CD4+ CD8+T-CELLS BLD-ACNC: 0.02 IU/ML
MCH RBC QN AUTO: 30.5 PG (ref 26.6–33)
MCHC RBC AUTO-ENTMCNC: 34.4 G/DL (ref 31.5–35.7)
MCV RBC AUTO: 88.5 FL (ref 79–97)
MITOGEN IGNF BLD-ACNC: >10 IU/ML
MONOCYTES # BLD AUTO: 1.61 10*3/MM3 (ref 0.1–0.9)
MONOCYTES NFR BLD AUTO: 12.5 % (ref 5–12)
NEUTROPHILS NFR BLD AUTO: 73.6 % (ref 42.7–76)
NEUTROPHILS NFR BLD AUTO: 9.48 10*3/MM3 (ref 1.7–7)
NRBC BLD AUTO-RTO: 0 /100 WBC (ref 0–0.2)
PLATELET # BLD AUTO: 147 10*3/MM3 (ref 140–450)
PMV BLD AUTO: 12.5 FL (ref 6–12)
POTASSIUM SERPL-SCNC: 4.1 MMOL/L (ref 3.5–5.2)
QUANTIFERON INCUBATION: NORMAL
RBC # BLD AUTO: 4.89 10*6/MM3 (ref 4.14–5.8)
SERVICE CMNT-IMP: NORMAL
SODIUM SERPL-SCNC: 141 MMOL/L (ref 136–145)
SPECIMEN SOURCE: NORMAL
WBC NRBC COR # BLD: 12.87 10*3/MM3 (ref 3.4–10.8)

## 2023-04-12 PROCEDURE — 99024 POSTOP FOLLOW-UP VISIT: CPT

## 2023-04-12 PROCEDURE — 85025 COMPLETE CBC W/AUTO DIFF WBC: CPT

## 2023-04-12 PROCEDURE — 80048 BASIC METABOLIC PNL TOTAL CA: CPT

## 2023-04-12 PROCEDURE — 99222 1ST HOSP IP/OBS MODERATE 55: CPT | Performed by: INTERNAL MEDICINE

## 2023-04-12 PROCEDURE — 63710000001 TACROLIMUS PER 1 MG

## 2023-04-12 PROCEDURE — 99238 HOSP IP/OBS DSCHRG MGMT 30/<: CPT | Performed by: INTERNAL MEDICINE

## 2023-04-12 RX ORDER — DEXAMETHASONE 2 MG/1
2 TABLET ORAL
Qty: 3 TABLET | Refills: 0 | Status: SHIPPED | OUTPATIENT
Start: 2023-04-12 | End: 2023-04-15

## 2023-04-12 RX ORDER — OXYCODONE HYDROCHLORIDE AND ACETAMINOPHEN 5; 325 MG/1; MG/1
1 TABLET ORAL ONCE
Status: COMPLETED | OUTPATIENT
Start: 2023-04-12 | End: 2023-04-12

## 2023-04-12 RX ORDER — LEVETIRACETAM 1000 MG/1
1000 TABLET ORAL EVERY 12 HOURS SCHEDULED
Start: 2023-04-12 | End: 2023-04-12

## 2023-04-12 RX ORDER — DEXAMETHASONE 4 MG/1
4 TABLET ORAL 2 TIMES DAILY WITH MEALS
Qty: 6 TABLET | Refills: 0 | Status: SHIPPED | OUTPATIENT
Start: 2023-04-12 | End: 2023-04-15

## 2023-04-12 RX ORDER — DEXAMETHASONE 2 MG/1
2 TABLET ORAL 2 TIMES DAILY WITH MEALS
Qty: 6 TABLET | Refills: 0 | Status: SHIPPED | OUTPATIENT
Start: 2023-04-12 | End: 2023-04-15

## 2023-04-12 RX ORDER — DEXAMETHASONE 4 MG/1
4 TABLET ORAL
Qty: 3 TABLET | Refills: 0 | Status: SHIPPED | OUTPATIENT
Start: 2023-04-12 | End: 2023-04-15

## 2023-04-12 RX ORDER — LEVETIRACETAM 1000 MG/1
1000 TABLET ORAL 2 TIMES DAILY
Qty: 60 TABLET | Refills: 0 | Status: SHIPPED | OUTPATIENT
Start: 2023-04-12 | End: 2023-05-12

## 2023-04-12 RX ORDER — LEVETIRACETAM 1000 MG/1
1000 TABLET ORAL 2 TIMES DAILY
Qty: 60 TABLET | Refills: 0 | Status: SHIPPED | OUTPATIENT
Start: 2023-04-12 | End: 2023-04-12

## 2023-04-12 RX ADMIN — ACETAMINOPHEN 325MG 650 MG: 325 TABLET ORAL at 09:32

## 2023-04-12 RX ADMIN — OXYCODONE HYDROCHLORIDE AND ACETAMINOPHEN 1 TABLET: 5; 325 TABLET ORAL at 05:53

## 2023-04-12 RX ADMIN — LEVETIRACETAM 1000 MG: 500 TABLET, FILM COATED ORAL at 18:07

## 2023-04-12 RX ADMIN — LEVETIRACETAM 1000 MG: 500 TABLET, FILM COATED ORAL at 09:24

## 2023-04-12 RX ADMIN — TACROLIMUS 1.5 MG: 0.5 CAPSULE ORAL at 09:24

## 2023-04-12 RX ADMIN — OXYCODONE HYDROCHLORIDE AND ACETAMINOPHEN 1 TABLET: 5; 325 TABLET ORAL at 18:07

## 2023-04-12 NOTE — PROGRESS NOTES
"    [facility]    Cheo Garrett  1975  [id number]      G93.89, Brain Bx 04/11/23    Seizures    Liver transplanted    Immunosuppressed status      Admit Diagnosis: Seizure [R56.9]  Seizures [R56.9]  Date of Admission: 4/7/2023 10:43 AM     Interval History: Patient underwent needle biopsy with Dr. Raymond Gibbons yesterday 4/11/2023.  Biopsy showed results concerning for a high-grade glial neoplasm.    Post-Op Day: 1  Surgery Performed: Left frontal brain biopsy    Physical Exam:  Blood pressure 122/80, pulse 68, temperature 97.2 °F (36.2 °C), temperature source Axillary, resp. rate 18, height 193 cm (75.98\"), weight 109 kg (239 lb 12.8 oz), SpO2 98 %.  No intake/output data recorded.  Physical Exam   Patient is laying flat in the bed, he is pleasant, conversational, responds to all commands.  Cranial nerves grossly intact.  Incision is clean, dry, intact.    Data Review:   (All imaging reviewed independently unless state otherwise)  CT Head Without Contrast    Result Date: 4/11/2023  CT HEAD WO CONTRAST Date of Exam: 4/11/2023 12:24 PM EDT Indication: Post Op Craniotomy Evaluation. Comparison: 4/7/2023 Technique: Axial CT images were obtained of the head without contrast administration.  Reconstructed coronal and sagittal images were also obtained. Automated exposure control and iterative construction methods were used. Findings: Parenchyma:No acute intraparenchymal hemorrhage. No loss of gray-white differentiation to suggest large territory infarct. Normal parenchymal volume. No substantial white matter disease. No midline shift or herniation. A few foci of gas within an area of  heterogeneous brain parenchyma and the left basal ganglia consistent with biopsy of known brain neoplasm. Ventricles and extra axial spaces:Normal caliber of ventricles and sulci. No extra axial fluid collection seen. Small amount of free air within the extra-axial spaces from recent " biopsy. Other:Orbits are grossly intact. Paranasal sinuses are clear. Mastoid air cells are clear. Postoperative changes of left frontal craniotomy. No substantial intracranial atherosclerotic calcification. Small amount of gas within the left frontal scalp consistent with recent operation.     Impression: Impression: Postoperative changes of left basal ganglia lesion biopsy with few foci of gas seen at the biopsy site as well as a small amount of pneumocephalus. No evidence of acute intracranial abnormality otherwise. Electronically Signed: Andrew Augustin  4/11/2023 12:54 PM EDT  Workstation ID: [id number]    CT Head Without Contrast    Result Date: 4/7/2023  CT HEAD WO CONTRAST Date of Exam: 4/7/2023 11:50 AM EDT Indication: new onset sz. Comparison: None available. Technique: Axial CT images were obtained of the head without contrast administration.  Reconstructed coronal and sagittal images were also obtained. Automated exposure control and iterative construction methods were used. Findings: Faint hypodensity is seen within the left basal ganglia, likely either chronic lacunar infarct or prominent perivascular space. There is otherwise no evidence of intracranial hemorrhage, mass or mass effect. The ventricles are normal in size and configuration. The orbits are normal. The paranasal sinuses are grossly clear. The calvarium is intact.     Impression: Impression: Faint hypodensity is seen within the left basal ganglia, likely either chronic lacunar infarct or prominent perivascular space. No acute intracranial abnormality otherwise. Electronically Signed: Christiano Nino  4/7/2023 12:15 PM EDT  Workstation ID: [id number]        Diagnosis:  (R56.9) New onset seizure    (R41.0) Confusion    (D72.829) Leukocytosis, unspecified type    (E87.6) Hypokalemia    (Z74.09) Impaired functional mobility, balance, gait, and endurance    (G93.89) Brain mass - Plan: Case Request, Case Request, Tissue Pathology Exam, Tissue  Pathology Exam, Anaerobic Culture - Tissue, Brain, Anaerobic Culture - Tissue, Brain, Fungus Culture - Tissue, Brain, Fungus Culture - Tissue, Brain, Tissue / Bone Culture - Tissue, Brain, Tissue / Bone Culture - Tissue, Brain, - Miscellaneous Test, - Miscellaneous Test  Seizure [R56.9]  Seizures [R56.9]    Medical Decision Making:   Patient's biopsy results were concerning for high-grade glial neoplasm and the next steps will be radiation and oncology consultations.  There is some concern about where they would like to establish care as they live in [place], [place].  Patient and wife would at least like to see what the pathway of oncology care here would look like.  Please keep the patient on his Keppra as well as his Decadron.  No role for further imaging at this time unless clinically indicated.  Patient is cleared to be discharged home from a neurosurgical point of view.    Emiliano Hightower PA-C  4/12/2023  Surgeon: Gonzales Gibbons MD

## 2023-04-12 NOTE — CONSULTS
CONSULTATION NOTE    NAME:      Cheo Garrett  :                                                          1975  DATE OF CONSULTATION:                       2023   REQUESTING PHYSICIAN:                   Raymond Gibbons MD  REASON FOR CONSULTATION:           Brain mass  1. New onset seizure    2. Confusion    3. Leukocytosis, unspecified type    4. Hypokalemia    5. Impaired functional mobility, balance, gait, and endurance    6. Brain mass       Thank you for consulting the services of radiation oncology.  We have been asked to see Cheo Garrett as an inpatient for discussion and consideration of radiation therapy to a symptomatic left frontal lobe brain mass, clinically and radiographically consistent with a high-grade glial neoplasm.       BRIEF HISTORY:  Cheo Garrett is a very pleasant 47 y.o. male residing with his wife and 3-year old in [place], [place] who is in town visiting family.  He has a history of liver cancer status post liver transplantation at [facility], on chronic immunosuppression with Tacrolimus.  About one week ago, he developed some confusion and increased somnolence.  He had a witnessed seizure, found to be postictal on EMS arrival.  He was admitted to [facility] on 2023 for further work up.  He was started on Keppra.  CT head 2023 demonstrated faint hypodensity within the left basal ganglia.  MRI brain revealed a 2.2 cm enhancing and partially cystic mass of the left basal ganglia corresponding to the area identified on CT, with surrounding edema extending towards the insular and left temporal regions, suspicious for primary glial neoplasm.  An enhancing 8 mm nodule located just posteriorly appears consistent with small satellite nodule.  No additional mass or abnormal enhancement is seen.  CT scans of the chest, abdomen, pelvis 2023 do not demonstrate findings of primary malignancy or metastatic disease.  Due to the problematic location of this tumor, it was felt that  complete surgical resection would not be feasible.  The patient was taken for stereotactic left frontal brain biopsy of his deep-seated lesion in the basal ganglia with Dr. Gibbons 4/11/2023.  Preliminary pathology is unfortunately consistent with a primary glial tumor favoring glioblastoma.  Repeat CT scan of the head 4/11/2023 shows expected postoperative changes.  He denies any other associated symptoms of headaches, nausea, focal weakness or paresthesias.  He is anxious to be discharged, and wants to consider treatment options at this time.          No Known Allergies    Social History     Tobacco Use   • Smoking status: Never     Passive exposure: Never   • Smokeless tobacco: Never   Vaping Use   • Vaping Use: Never used   Substance Use Topics   • Alcohol use: Never   • Drug use: Never       Past Medical History:   Diagnosis Date   • Liver cirrhosis     liver transplant 2013       family history is not on file.     Past Surgical History:   Procedure Laterality Date   • CRANIOTOMY FOR TUMOR N/A 4/11/2023    Procedure: CRANIOTOMY WITH BIOPSY;  Surgeon: Gonzales Gibbons MD;  Location: [facility];  Service: Neurosurgery;  Laterality: N/A;        Review of Systems   All other systems reviewed and are negative.          Objective   VITAL SIGNS:   Vitals:    04/12/23 0700 04/12/23 0800 04/12/23 0900 04/12/23 1000   BP: 126/77 118/85 122/80 135/91   BP Location:  Right arm Right arm Right arm   Patient Position:  Lying Lying Lying   Pulse: 63 69 68 80   Resp:  18 18 18   Temp:   97.2 °F (36.2 °C)    TempSrc:   Axillary    SpO2: 94% 92% 98% 97%   Weight:       Height:                  KPS 90%      Physical Exam  Vitals and nursing note reviewed.   Constitutional:       General: He is not in acute distress.     Appearance: Normal appearance. He is well-developed.   HENT:      Head: Normocephalic and atraumatic.      Comments: Surgical incision s/p left frontal lobe stereotactic biopsy appears CDI and open to  air  Eyes:      Conjunctiva/sclera: Conjunctivae normal.      Pupils: Pupils are equal, round, and reactive to light.   Cardiovascular:      Rate and Rhythm: Normal rate and regular rhythm.   Pulmonary:      Effort: Pulmonary effort is normal.      Breath sounds: Normal breath sounds.   Musculoskeletal:         General: Normal range of motion.      Cervical back: Normal range of motion and neck supple.   Lymphadenopathy:      Cervical: No cervical adenopathy.   Skin:     General: Skin is warm and dry.   Neurological:      General: No focal deficit present.      Mental Status: He is alert and oriented to person, place, and time. Mental status is at baseline.      Cranial Nerves: No cranial nerve deficit.      Sensory: No sensory deficit.      Motor: No weakness.      Coordination: Coordination normal.      Gait: Gait normal.   Psychiatric:         Behavior: Behavior normal.         Thought Content: Thought content normal.         Judgment: Judgment normal.       IMAGING:  CT Head Without Contrast     Result Date: 4/7/2023     Impression: Faint hypodensity is seen within the left basal ganglia, likely either chronic lacunar infarct or prominent perivascular space. No acute intracranial abnormality otherwise. Electronically Signed: Christiano Nino  4/7/2023 12:15 PM EDT  Workstation ID: [id number]     MRI Brain With & Without Contrast     Result Date: 4/7/2023     Impression: 2.2 cm enhancing and partially cystic mass of the left basal ganglia as above, with surrounding edema extending towards the insular and left temporal regions, favoring primary glial neoplasm over metastasis. Recommend neurosurgical consultation. Electronically Signed: Christiano Nino  4/7/2023 4:32 PM EDT  Workstation ID: [id number]       PATHOLOGY:  Frozen sections consistent with a high-grade glial neoplasm.  Final pathology is currently pending.       The following portions of the patient's history were reviewed and updated as appropriate: allergies,  current medications, past family history, past medical history, past social history, past surgical history and problem list.    Assessment      IMPRESSION:  Mr. Garrett is a 47 y.o. gentleman who presents now with an unresectable left frontal lobe mass radiographically and clinically consistent with a glioblastoma multiforme.  His preliminary pathology is consistent with a primary high-grade glial neoplasm, final pathology is still pending.  I had a long discussion with the patient and his wife today regarding the overall prognosis and treatment options for a glioblastoma.  Initial recommendations including treating to a dose of 60 Gray over 30 fractions using IMRT concurrent with temozolomide chemotherapy.  We discussed risks/benefits of treatment.  The patient has a high performance status and remains a good candidate for definitive treatment.  We discussed initiating treatment in a fairly urgent fashion given the aggressive nature of this neoplasm.  Ultimately, the patient and his wife would like to discuss logistics further, as they live several hours away and are unsure how realistic it would be to undergo treatment here for 6 weeks and find care for their toddler.  They are potentially interested in researching [facility] brain tumor clinic, but moreso leaning towards initiating treatment in [place] closer to home.  I am happy to assist in referring out, sending medical records, etc. if they choose to establish care with radiation oncology and medical oncology outside of [facility].  The patient reports he is awaiting discharge later today, and I have suggested that they go ahead and request medical records including imaging discs to take with them in the likely event that they seek treatment elsewhere/closer to home.       RECOMMENDATIONS:  Patient leaning toward initiating treatment locally in [place].  Anticipate partial left brain irradiation to a dose of 60 Gray in 30 fractions over a 6-week period using IMRT  technique.  If patient ultimately opts to pursue treatment at [facility], then we will expedite radiation set up and simulation session and referral to medical oncology to facilitate concurrent Temodar chemotherapy.        SUMMER Dsouza    I saw and evaluated Mr. Garrett with my nurse practitioner, Olga Delgado.  I agree with her assessment and plan as above.      In short, Mr. Garrett is a 47 year old gentleman with a complex past medical history including a prior liver transplant for management of HCC.  He is on chronic immunosuppression, and recently presented with rather abrupt onset of a generalized t/c seizure and subsequent loss of consciousness.  He was airlifted here and imaging was obtained demonstrating a mass in the medial left temporal lobe in the basal ganglia, immediately adjacent to and abutting the middle cerebral artery.  He was evaluated by Dr. Gibbons with Neurosurgery.  Due to the location, the mass is not resectable, and he underwent a stereotactic biopsy alone.  Preliminary path is consistent with a high grade glioma.  Moleculars are pending.      His imaging is consistent with a high grade tumor, which we would initially assume to be consistent with a GBM.  Will await final molecular profile, but treatment is most likely to consist of chemoradiation with Temodar.  Given his age and functional status, my recommendation would be 60 Gy in 30 fractions using IMRT.  We also discussed the role of stereotactic techniques, which are not considered standard of care for someone with his functional status as upfront treatment, but could certainly be used to address residual disease following initial therapy.  This could be particularly useful in his case since he was unresectable.    I did have a conversation today with the patient and his wife regarding the overall grading, molecular profiling, and ultimately the prognosis of primary brain tumors.  This included an explanation of the initial  treatments, likelihood for relapse, and salvage options.  They asked several questions which I believe I answered as honestly as able.    I think he will best be suited to receive radiation closer to home, which after discussing with them, the [facility] seems like their best option.  I have personally contacted that clinic to help expedite the process and coordinate a referral for urgent treatment.        I spent a total of 50 minutes on today's visit, with more than 20 minutes in direct face to face communication, and the remainder of the time spent in reviewing the relevant history, records, available imaging, and for documentation.

## 2023-04-12 NOTE — PLAN OF CARE
Goal Outcome Evaluation:    Discharge education complete.     AG Chowdary-Neurosurgery to order prescriptions at [facility]. Decadron to be ordered per neurosurgery.     Patient to see PCP at home for suture removal in 2 weeks.

## 2023-04-12 NOTE — DISCHARGE SUMMARY
DISCHARGE SUMMARY    Patient Name: Cheo Garrett  : 1975  MRN: [id number]    Date of Admission: 2023 10:43 AM  Date of Discharge: 2023  6:29 PM   Primary Care Physician: Provider, No Known    Reason for hospitalization     HPI:  Cheo Garrett is a 47 y.o. male was admitted on 2023 with the initial diagnosis of Seizure [R56.9]  Seizures [R56.9].    PMHx significant for liver cancer s/p Liver transplant at [facility] () on chronic immunosuppressant therapy who presented today via EMS after wife found him having a seizure at home this morning. Patient had been having some ongoing confusion that started on Monday of this week and has progressively worsened. He denied fever/chills. No headache. He had no prior hx of seizures. He takes no other medications besides his Tacrolimus. He was confused and thought he was at [facility]. He did not remember the events from that morning.     Significant findings.  Discharge diagnosis/problems:      Diagnosis   • **G93.89, Brain Bx 23 [G93.89] - Glioblastoma   • Seizures [R56.9]   • Liver transplanted [Z94.4]   • Immunosuppressed status [D84.9]      Lab Results   Lab Value Date/Time    INTRAOP  2023     Frozen section: Verbal report given to Dr. Gibbons in person on 2023 at 09:42 EDT.    FROZEN SECTION DIAGNOSIS: Malignant neoplasm, favor high-grade glioma (FGG)  Stains used for Immediate Evaluation are acceptable.      FINALDX  2023     BRAIN, LEFT TEMPORAL LOBE, BIOPSY (1, 2):  Glioblastoma (CNS WHO grade 4), NOS  See comment      COMDX  2023     Tests for molecular characterization are currently pending, results will be issued in an addendum.         Physical Examination and Data     Vitals:  Temp: 97.2 °F (36.2 °C) (23 0900) Temp  Min: 97.2 °F (36.2 °C)  Max: 98 °F (36.7 °C)   Temp core:      BP: 131/84 (23 1700) BP  Min: 115/76  Max: 145/96   Pulse: 105 (23 1700) Pulse  Min: 63  Max: 107    Resp: 18 (04/12/23 1600) Resp  Min: 16  Max: 18   SpO2: 97 % (04/12/23 1700) SpO2  Min: 92 %  Max: 98 %   Device: room air (04/12/23 1600)    Flow Rate: 2 (04/11/23 1200) No data recorded     Physical Examination  Telemetry:  Rhythm: normal sinus rhythm      Constitutional:  No acute distress.   Cardiovascular: RRR.    Respiratory: Normal breath sounds  No adventitious sounds   Abdominal:  Soft with no tenderness.   Extremities: No Edema   Neurological:             Alert, Oriented, Cooperative.  Best Eye Response: 4-->(E4) spontaneous   Best Motor Response: 6-->(M6) obeys commands   Best Verbal Response: 5-->(V5) oriented   Jocelin Coma Scale Score: 15       Total (NIH Stroke Scale): 0 (04/11/23 1900)     Results Reviewed:  Laboratory  Microbiology  Radiology  Pathology    Hematology:  Results from last 7 days   Lab Units 04/12/23  0542 04/09/23  0640 04/08/23  0538   WBC 10*3/mm3 12.87* 9.66 9.94   NEUTROS ABS 10*3/mm3 9.48* 5.92 8.20*   IMM GRAN % % 0.4 0.9* 0.4   LYMPHS ABS 10*3/mm3 1.65 2.39 1.30   MONOS ABS 10*3/mm3 1.61* 1.15* 0.38   EOS ABS 10*3/mm3 0.06 0.05 0.00   BASOS ABS 10*3/mm3 0.02 0.06 0.02   HEMOGLOBIN g/dL 14.9 15.3 17.2   MCV fL 88.5 86.6 85.3   RDW % 12.6 12.1* 11.8*   PLATELETS 10*3/mm3 147 145 168     Chemistry:  Estimated Creatinine Clearance: 180.4 mL/min (A) (by C-G formula based on SCr of 0.7 mg/dL (L)).    Results from last 7 days   Lab Units 04/12/23  0647 04/09/23  0640   SODIUM mmol/L 141 144   POTASSIUM mmol/L 4.1 3.7   CHLORIDE mmol/L 107 107   CO2 mmol/L 27.0 26.0   BUN mg/dL 16 16   CREATININE mg/dL 0.70* 0.80   GLUCOSE mg/dL 132* 97     Results from last 7 days   Lab Units 04/12/23  0647 04/09/23  0640 04/08/23  0538 04/07/23  1053   CALCIUM mg/dL 8.7 9.3   < > 8.8   MAGNESIUM mg/dL  --   --   --  1.9    < > = values in this interval not displayed.     Hepatic Panel:  Results from last 7 days   Lab Units 04/07/23  1053   ALBUMIN g/dL 4.2   BILIRUBIN mg/dL 0.5   AST (SGOT) U/L 24    ALT (SGPT) U/L 27   ALK PHOS U/L 48      Coagulation Labs:  Results from last 7 days   Lab Units 04/07/23  1053   PROTIME Seconds 14.0   INR  1.08      Cardiac Labs:  Results from last 7 days   Lab Units 04/07/23  1053   HSTROP T ng/L 9     Biomarkers:  Results from last 7 days   Lab Units 04/07/23  1826 04/07/23  1243 04/07/23  1053   LACTATE mmol/L 1.7 4.0* 6.4*   PROCALCITONIN ng/mL  --   --  0.03     U/A  Results from last 7 days   Lab Units 04/07/23  1227   COLOR UA  Yellow   CLARITY UA  Cloudy*   PH, URINE  <=5.0   SPECIFIC GRAVITY, URINE  1.018   GLUCOSE UA  Negative   KETONES UA  Trace*   BILIRUBIN UA  Negative   PROTEIN UA  30 mg/dL (1+)*   BLOOD UA  Moderate (2+)*   LEUKOCYTES UA  Negative   NITRITE UA  Negative   UROBILINOGEN UA  1.0 E.U./dL     Results from last 7 days   Lab Units 04/07/23  1227   RBC UA /HPF 0-2   WBC UA /HPF 0-2   BACTERIA UA /HPF None Seen   SQUAM EPITHEL UA /HPF 0-2   HYALINE CASTS UA /LPF 0-6       COVID-19  Lab Results   Component Value Date    COVID19 Not Detected 04/07/2023     Images:  EEG    Result Date: 4/7/2023  Diffuse cerebral dysfunction of mild degree but nonspecific No ongoing seizures are seen This report is transcribed using the Dragon dictation system.      CT Abdomen Pelvis With & Without Contrast    Result Date: 4/8/2023  Impression: 1. No findings of primary malignancy or metastatic adenopathy in the chest, abdomen, or pelvis. 2. Post surgical changes of liver transplant. 3. Wall thickening at mid and distal esophagus, correlate for findings of reflux/esophagitis. 4. Additional incidental chronic findings above. Electronically Signed: Juan Chandler  4/8/2023 8:52 PM EDT  Workstation ID: [id number]    CT Head Without Contrast    Result Date: 4/11/2023  Impression: Postoperative changes of left basal ganglia lesion biopsy with few foci of gas seen at the biopsy site as well as a small amount of pneumocephalus. No evidence of acute intracranial abnormality otherwise.  Electronically Signed: Andrew Augustin  4/11/2023 12:54 PM EDT  Workstation ID: [id number]    CT Head Without Contrast    Result Date: 4/7/2023  Impression: Faint hypodensity is seen within the left basal ganglia, likely either chronic lacunar infarct or prominent perivascular space. No acute intracranial abnormality otherwise. Electronically Signed: Christiano Nino  4/7/2023 12:15 PM EDT  Workstation ID: [id number]    CT Chest With & Without Contrast Diagnostic    Result Date: 4/8/2023  Impression: 1. No findings of primary malignancy or metastatic adenopathy in the chest, abdomen, or pelvis. 2. Post surgical changes of liver transplant. 3. Wall thickening at mid and distal esophagus, correlate for findings of reflux/esophagitis. 4. Additional incidental chronic findings above. Electronically Signed: Juan Chandler  4/8/2023 8:52 PM EDT  Workstation ID: [id number]    MRI Brain With & Without Contrast    Result Date: 4/7/2023  Impression: 2.2 cm enhancing and partially cystic mass of the left basal ganglia as above, with surrounding edema extending towards the insular and left temporal regions, favoring primary glial neoplasm over metastasis. Recommend neurosurgical consultation. Electronically Signed: Christiano Nino  4/7/2023 4:32 PM EDT  Workstation ID: [id number]    XR Chest 1 View    Result Date: 4/7/2023  No evidence of acute disease in the chest. Electronically Signed: Christiano Nino  4/7/2023 11:31 AM EDT  Workstation ID: [id number]    Results: Reviewed.  I reviewed the patient's new laboratory and imaging results.  I independently reviewed the patient's new images.    Medications: Reviewed.    Hospital Course, Consults and Procedures  provided:     He had a CTH that revealed faint hypodensity is seen within the left basal ganglia.  He underwent MRI Brain that revealed 2.2 cm enhancing and partially cystic mass of the left basal ganglia as above, with surrounding edema extending towards the insular and left temporal regions,  favoring primary glial neoplasm over metastasis. NS was consulted and he was started on Dexamethasone and PPI.  He was also started on prophylactic Keppra.  Given his immunocompromised state, ID was also consulted for possible infectious process.  He underwent LP.  CT c/a/p were negative for malignancy.  He underwent brain biopsy 4/11/23 by Dr. Gibbons.  Pathology was c/w glioblastoma.  Due to location, tumor was not resectable.  He was seen by Radiation Oncology and Oncologywho recommended chemoradiation.  However, patient lives in [place] and wants to have treatment closer to home at [facility] or a facility in [place] or [place].  He is ready for discharge.  He will be sent home with Keppra and Decadron per NS.      Consults:  Consults     Date and Time Order Name Status Description    4/12/2023 12:10 PM Inpatient Hematology & Oncology Consult Completed     4/12/2023 10:02 AM Inpatient Radiation Oncology Consult Completed     4/8/2023 11:38 AM Inpatient Infectious Diseases Consult Completed     4/7/2023  5:29 PM Inpatient Neurology Consult General Completed     4/7/2023  5:20 PM Inpatient Neurosurgery Consult Completed         Procedures:  4/7/23 EEG - Diffuse cerebral dysfunction of mild degree but nonspecific.  No ongoing seizures are seen    4/7/23 Lumbar Puncture    4/11/23 left frontal brain biopsy     Condition on discharge     Good. (Vital signs within normal limits. Patient conscious and comfortable)    Patient and family instructions     Discharge Disposition: Home or Self Care    CODE STATUS:   Code Status and Medical Interventions:   Ordered at: 04/11/23 1215     Level Of Support Discussed With:    Patient     Code Status (Patient has no pulse and is not breathing):    CPR (Attempt to Resuscitate)     Medical Interventions (Patient has pulse or is breathing):    Full       No Known Allergies    Discharge Medications:     Discharge Medications      New Medications      Instructions Start Date    levETIRAcetam 1000 MG tablet  Commonly known as: KEPPRA   1,000 mg, Oral, Every 12 Hours Scheduled      Decadron as prescribed by NS     Continue These Medications      Instructions Start Date   multivitamin with minerals tablet tablet   1 tablet, Oral, Daily, OTC      tacrolimus 0.5 MG capsule  Commonly known as: PROGRAF   1.5 mg, Oral, 2 Times Daily         Stop These Medications    aspirin 81 MG EC tablet     ibuprofen 200 MG tablet  Commonly known as: ADVIL,MOTRIN            Activity: As tolerated      Diet: Diet: Regular/House Diet; Texture: Regular Texture (IDDSI 7); Fluid Consistency: Thin (IDDSI 0)        No future appointments.       Pending Labs     Order Current Status    - Miscellaneous Test In process    Anaerobic Culture - Tissue, Brain In process    Fungus Culture - Tissue, Brain In process    Tissue Pathology Exam In process    Toxoplasma Gondii, PCR - Cerebrospinal Fluid, Lumbar Puncture In process    Tissue / Bone Culture - Tissue, Brain Preliminary result        Time Spent on Discharge:    I spent  45  minutes on this discharge activity which included: face-to-face encounter with the patient, reviewing the data in the system, coordination of the care with the nursing staff as well as consultants, documentation, and entering orders.      Electronically signed by SUMMER Maynard, 04/13/23, 9:50 AM EDT.

## 2023-04-12 NOTE — PROGRESS NOTES
INTENSIVIST   PROGRESS NOTE        SUBJECTIVE     Cheo 47 y.o. male is followed for: Altered Mental Status       G93.89, Brain Bx 23    Seizures    Liver transplanted    Immunosuppressed status    As an Intensivist, we provide an integrated approach to the ICU patient and family, medical management of comorbid conditions, including but not limited to electrolytes, glycemic control, organ dysfunction, lead interdisciplinary rounds and coordinate the care with all other services, including those from other specialists.     Interval History:  POD: 1 Day Post-Op    Doing well.    Headaches on/off.    On Ambient air.    They want to talk to Oncology service here to get more information before going back home. ([place], [place]).    Temp  Min: 97.2 °F (36.2 °C)  Max: 98 °F (36.7 °C)       History     Last Reviewed by Donis Briseno MD on 2023 at  3:25 PM    Sections Reviewed    Medical, Family, Tobacco, Alcohol, Drug Use, Sexual Activity, Social   Documentation      Medicines reviewed by Donis Briseno MD on 2023 at  3:24 PM  Allergies reviewed by Donis Briseno MD on 2023 at  3:24 PM       The patient's relevant past medical, surgical and social history were reviewed and updated in Epic as appropriate.        OBJECTIVE     Vitals:  Temp: 97.2 °F (36.2 °C) (23 0900) Temp  Min: 97.2 °F (36.2 °C)  Max: 98 °F (36.7 °C)   Temp core:      BP: 117/73 (23 1600) BP  Min: 115/76  Max: 145/96   MAP (non-invasive) Noninvasive MAP (mmHg): 80 (23 1600) Noninvasive MAP (mmHg)  Av.9  Min: 80  Max: 114   Pulse: 86 (23 1600) Pulse  Min: 63  Max: 107   Resp: 18 (23 1600) Resp  Min: 16  Max: 18   SpO2: 96 % (23 1600) SpO2  Min: 92 %  Max: 98 %   Device: room air (23 1600)    Flow Rate: 2 (23 1200) No data recorded         23  1115 23  1617   Weight: 111 kg (245 lb) 109 kg (239 lb 12.8 oz)        Intake/Ouptut 24 hrs (7:00AM - 6:59 AM)  Intake & Output  (last 3 days)       04/09 0701  04/10 0700 04/10 0701 04/11 0700 04/11 0701 04/12 0700 04/12 0701 04/13 0700    P.O.  240      Total Intake(mL/kg)  240 (2.2)      Urine (mL/kg/hr)   650 (0.2)     Total Output   650     Net  +240 -650             Urine Unmeasured Occurrence  2 x            Medications (drips):  niCARdipine, Last Rate: Stopped (04/11/23 1305)      Physical Examination  Telemetry:  Rhythm: normal sinus rhythm (04/12/23 1600)         Constitutional:  No acute distress.   Cardiovascular: RRR.    Respiratory: Normal breath sounds  No adventitious sounds   Abdominal:  Soft with no tenderness.   Extremities: No Edema   Neurological:   Alert, Oriented, Cooperative.  Best Eye Response: 4-->(E4) spontaneous (04/12/23 1600)  Best Motor Response: 6-->(M6) obeys commands (04/12/23 1600)  Best Verbal Response: 5-->(V5) oriented (04/12/23 1600)  Jocelin Coma Scale Score: 15 (04/12/23 1600)     Total (NIH Stroke Scale): 0 (04/11/23 1900)       Results Reviewed:  Laboratory  Microbiology  Radiology  Pathology    Hematology:  Results from last 7 days   Lab Units 04/12/23  0542 04/09/23  0640 04/08/23  0538   WBC 10*3/mm3 12.87* 9.66 9.94   NEUTROS ABS 10*3/mm3 9.48* 5.92 8.20*   IMM GRAN % % 0.4 0.9* 0.4   LYMPHS ABS 10*3/mm3 1.65 2.39 1.30   MONOS ABS 10*3/mm3 1.61* 1.15* 0.38   EOS ABS 10*3/mm3 0.06 0.05 0.00   BASOS ABS 10*3/mm3 0.02 0.06 0.02   HEMOGLOBIN g/dL 14.9 15.3 17.2   MCV fL 88.5 86.6 85.3   RDW % 12.6 12.1* 11.8*   PLATELETS 10*3/mm3 147 145 168       Chemistry:  Estimated Creatinine Clearance: 180.4 mL/min (A) (by C-G formula based on SCr of 0.7 mg/dL (L)).    Results from last 7 days   Lab Units 04/12/23  0647 04/09/23  0640   SODIUM mmol/L 141 144   POTASSIUM mmol/L 4.1 3.7   CHLORIDE mmol/L 107 107   CO2 mmol/L 27.0 26.0   BUN mg/dL 16 16   CREATININE mg/dL 0.70* 0.80   GLUCOSE mg/dL 132* 97     Results from last 7 days   Lab Units 04/12/23  0647 04/09/23  0640 04/08/23  0538 04/07/23  1053    CALCIUM mg/dL 8.7 9.3   < > 8.8   MAGNESIUM mg/dL  --   --   --  1.9    < > = values in this interval not displayed.     COVID-19  Lab Results   Component Value Date    COVID19 Not Detected 04/07/2023       Images:  EEG    Result Date: 4/7/2023  Diffuse cerebral dysfunction of mild degree but nonspecific No ongoing seizures are seen This report is transcribed using the Dragon dictation system.      CT Abdomen Pelvis With & Without Contrast    Result Date: 4/8/2023  Impression: 1. No findings of primary malignancy or metastatic adenopathy in the chest, abdomen, or pelvis. 2. Post surgical changes of liver transplant. 3. Wall thickening at mid and distal esophagus, correlate for findings of reflux/esophagitis. 4. Additional incidental chronic findings above. Electronically Signed: Juan Chandler  4/8/2023 8:52 PM EDT  Workstation ID: [id number]    CT Head Without Contrast    Result Date: 4/11/2023  Impression: Postoperative changes of left basal ganglia lesion biopsy with few foci of gas seen at the biopsy site as well as a small amount of pneumocephalus. No evidence of acute intracranial abnormality otherwise. Electronically Signed: Andrew Augustin  4/11/2023 12:54 PM EDT  Workstation ID: [id number]    CT Head Without Contrast    Result Date: 4/7/2023  Impression: Faint hypodensity is seen within the left basal ganglia, likely either chronic lacunar infarct or prominent perivascular space. No acute intracranial abnormality otherwise. Electronically Signed: Christiano Nino  4/7/2023 12:15 PM EDT  Workstation ID: [id number]    CT Chest With & Without Contrast Diagnostic    Result Date: 4/8/2023  Impression: 1. No findings of primary malignancy or metastatic adenopathy in the chest, abdomen, or pelvis. 2. Post surgical changes of liver transplant. 3. Wall thickening at mid and distal esophagus, correlate for findings of reflux/esophagitis. 4. Additional incidental chronic findings above. Electronically Signed: Juan Chandler   4/8/2023 8:52 PM EDT  Workstation ID: [id number]    MRI Brain With & Without Contrast    Result Date: 4/7/2023  Impression: 2.2 cm enhancing and partially cystic mass of the left basal ganglia as above, with surrounding edema extending towards the insular and left temporal regions, favoring primary glial neoplasm over metastasis. Recommend neurosurgical consultation. Electronically Signed: Christiano Nino  4/7/2023 4:32 PM EDT  Workstation ID: [id number]    XR Chest 1 View    Result Date: 4/7/2023  No evidence of acute disease in the chest. Electronically Signed: Christiano Nino  4/7/2023 11:31 AM EDT  Workstation ID: [id number]      Results: Reviewed.  I reviewed the patient's new laboratory and imaging results.  I independently reviewed the patient's new images.    Medications: Reviewed.    Assessment   A/P     Hospital:  LOS: 5 days   ICU: 1d 4h      Diagnosis   • **G93.89, Brain Bx 04/11/23 [G93.89]   • Seizures [R56.9]   • Liver transplanted [Z94.4]   • Immunosuppressed status [D84.9]     Cheo is a 47 y.o. male admitted on 4/7/2023  for evaluation after a witnessed seizure at home.     Per report, the patient's wife noted confusion and increased somnolence the week leading up to his generalized seizure when wife called EMS. By the time they arrived, his seizure resolved but remained confused. Upon ED admission, his CSF studies were negative for an infection. He was started on Keppra, and MRI brain obtained, demonstrating a 2.2-cm enhancing and partially cystic mass of the left basal ganglia with surrounding edema extending toward the insular and left temporal region, favoring primary glial neoplasm. Dr. Gibbons with neurosurgery was consulted, recommending biopsy.     Assessment/Management/Treatment Plan:    1. Brain: 2.2 cm enhancing and partially cystic mas of the left basal ganglia with surrounding edema.  S/P Brain Bx 04/11/23   Dr. Gibbons  Frozen: High-grade glial neoplasm    2. Neuro  a. New onset  seizure  3. GI/Hepatology  a. S/P Liver Transplant secondary to Liver cancer, 2013 - Immunosuppressive therapy with Tacrolimus.  4. Endocrine  a. Body mass index is 29.2 kg/m². Overweight: 25.0-29.9kg/m2   b. No history of T2 Diabetes    No results found for: HGBA1C     Results from last 7 days   Lab Units 04/07/23  1732   GLUCOSE mg/dL 83       Diet: Diet: Regular/House Diet; Texture: Regular Texture (IDDSI 7); Fluid Consistency: Thin (IDDSI 0)  Orders Placed This Encounter      DIET MESSAGE Please send cheese pizza,  salad with blue cheese, iced tea, and vanilla ice cream. Thank you :)      Advance Directives: Code Status and Medical Interventions:   Ordered at: 04/11/23 1215     Level Of Support Discussed With:    Patient     Code Status (Patient has no pulse and is not breathing):    CPR (Attempt to Resuscitate)     Medical Interventions (Patient has pulse or is breathing):    Full        DVT prophylaxis:  Mechanical DVT prophylaxis orders are present.     In brief:  1. Radiation Oncology and Oncology consult.  1. Per Dr. Luna, family may choose to have his treatments at the [facility] ([place])   2. Dr. He was also able to talk to him and his family  2. Discussed with patient and family. They live in [place], [place].  1. They need to establish their Oncology Care, nearby where they live ([facility]) or at [facility] ([place]) where he got his liver transplant.  3. Follow up final path.  4. Continue Dexamethasone and Levetiracetam per Neurosurgery.  5. Disposition: Discharge Home today.     Plan of care and goals reviewed during interdisciplinary rounds.  I discussed the patient's findings and my recommendations with patient, family, nursing staff and consulting provider    MDM:    Problem(s) High due to: Acute or Chronic illness or injury that may poses a threat to life or bodily function  Data: High due to: Review of prior external records from each unique source, Review of the result(s)  of each unique test and Discuss management or test interpretation with external physician or NPP (not separately reported)     High      [x] Primary Attending Intensive Care Medicine - Nutrition Support   [] Consultant    Copied text in this note has been reviewed and is accurate as of 04/12/23    Time < 30 minutes.

## 2023-04-12 NOTE — PROGRESS NOTES
INFECTIOUS DISEASE PROGRESS NOTE    Cheo Garrett  1975  [id number]    Date of Consult: 4/8/2023    Admission Date: 4/7/2023      Requesting Provider: Roxanne Iverson DO  Evaluating Physician: Duncan Pradhan MD    Reason for Consultation: immunocompromised, brain lesion    History of present illness:    4/8/23: Patient is a 47 y.o. male with h/o liver cancer and liver transplantation 2013 at [facility]/on Tacrolimus who we were asked to see for possible infectious brain lesion.  The patient was brought to [facility] ED on 4/7 by EMS after his wife witness the patient having a seizure at her family's home. They are from [place], but are visiting family near [place], [place].   The patient has no h/o seizures and has been relatively healthy since his liver transplantation.   He states he does no remember leading up to his being taken to [facility].  On arrival, the patient is afebrile.  Admitting labs are WBC 18,200 with 86% neutrophils, lactic acid 6.4, PCT 0.03, Creatinine 1.08, ammonia 49, and UA WBC 0-2.  A UDS was negative.  A respiratory panel PCR was negative.  Blood cultures are negative to date. A CXR showed no acute findings.  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  He underwent an LP yesterday with WBC CSF zero, glucose 88, and protein 30.7. A Meningitis/encephalitis panel PCR was negative.  A toxoplasma PCR from CSF is pending.  A CSF culture is negative to date. He is currently on no antibiotics. ID was asked to evaluate.  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     4/9/23:He denies headache, earache, sore throat.  He has remained afebrile.  He has had no further seizures.His white blood cell count today is 9.7.  His creatinine is 0.8.  Serum cryptococcal antigen was negative.  He denies nausea, vomiting, diarrhea.    4/10/23: He has remained afebrile. CSF and serum  cryptococcal antigens were negative. Toxoplasma serology is pending.  QuantiFERON Gold TB test is pending.  Urinary histoplasma Blastomyces antigens are pending. Brain biopsy is scheduled for tomorrow.  He complains of a mild headache.  Denies nausea and vomiting.    4/11/23: He has remained afebrile.  Toxoplasma serology is negative. Toxoplasma CSF PCR is pending. QuantiFERON Gold TB test is pending. 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    4/12/23:He has remained afebrile.  His white blood cell count is 12.9.  His QuantiFERON Gold TB test was negative.He denies severe headache.  He is planning to discharge today.  He may initially go to his wife's family's home in [place] and then back to [place].  He is meeting with radiation oncology and medical ooncology today.      Past Medical History:   Diagnosis Date   • Liver cirrhosis     liver transplant 2013       History reviewed. No pertinent surgical history.   Liver transplantation 2013    History reviewed. No pertinent family history.    Social History     Socioeconomic History   • Marital status:    Tobacco Use   • Smoking status: Never     Passive exposure: Never   • Smokeless tobacco: Never   Vaping Use   • Vaping Use: Never used   Substance and Sexual Activity   • Alcohol use: Never   • Drug use: Never   Denies tobacco, alcohol, or illicit drug use.   Lives in    No Known Allergies      Medication:    Current Facility-Administered Medications:   •  acetaminophen (TYLENOL) tablet 650 mg, 650 mg, Oral, Q4H PRN, Emiliano Hightower PA-C, 650 mg at 04/11/23 1829  •  docusate sodium (COLACE) capsule 100 mg, 100 mg, Oral, BID PRN, Emiliano Hightower PA-C  •  HYDROmorphone (DILAUDID) injection 0.5 mg, 0.5 mg, Intravenous, Q2H PRN, 0.5 mg at 04/11/23 2003 **AND** naloxone (NARCAN) injection 0.4 mg, 0.4 mg, Intravenous, Q5 Min PRN, Emiliano Hightower PA-C  •  levETIRAcetam (KEPPRA) tablet  1,000 mg, 1,000 mg, Oral, Q12H, Emiliano Hightower PA-C, 1,000 mg at 23  •  LORazepam (ATIVAN) injection 2 mg, 2 mg, Intravenous, Q5 Min PRN, Emiliano Hightower PA-C  •  magnesium hydroxide (MILK OF MAGNESIA) suspension 10 mL, 10 mL, Oral, Daily PRN, Emiliano Hightower PA-C  •  niCARdipine (CARDENE) 25mg in 250mL NS infusion, 5-15 mg/hr, Intravenous, Titrated, Emiliano Hightower PA-C, Held at 23 1305  •  [DISCONTINUED] ondansetron (ZOFRAN) tablet 4 mg, 4 mg, Oral, Q6H PRN **OR** ondansetron (ZOFRAN) injection 4 mg, 4 mg, Intravenous, Q6H PRN, Emiliano Hightower PA-C  •  oxyCODONE-acetaminophen (PERCOCET) 5-325 MG per tablet 1 tablet, 1 tablet, Oral, Q6H PRN, Donis Briseno MD, 1 tablet at 23 0553  •  polyethylene glycol (MIRALAX) packet 17 g, 17 g, Oral, Daily PRN, Emiliano Hightower PA-C  •  Potassium Replacement - Follow Nurse / BPA Driven Protocol, , Does not apply, PRN, Emiliano Hightower PA-C  •  sennosides-docusate (PERICOLACE) 8.6-50 MG per tablet 1 tablet, 1 tablet, Oral, Nightly PRN, Emiliano Hightower PA-C  •  tacrolimus (PROGRAF) capsule 1.5 mg, 1.5 mg, Oral, Q12H, Emiliano Hightower PA-C, 1.5 mg at 23    Antibiotics:  Anti-Infectives (From admission, onward)    Ordered     Dose/Rate Route Frequency Start Stop    23 0739  ceFAZolin in dextrose (ANCEF) IVPB solution 2 g        Ordering Provider: Emiliano Hightower PA-C    2 g  over 30 Minutes Intravenous Once 23 0830 23 0835            Review of Systems:  See above      Physical Exam:   Vital Signs  Temp (24hrs), Av.5 °F (36.4 °C), Min:97 °F (36.1 °C), Max:98.1 °F (36.7 °C)    Temp  Min: 97 °F (36.1 °C)  Max: 98.1 °F (36.7 °C)  BP  Min: 122/81  Max: 148/96  Pulse  Min: 63  Max: 93  Resp  Min: 14  Max: 18  SpO2  Min: 93 %  Max: 100 %    GENERAL: Awake and alert, in no acute distress.   HEENT: Normocephalic, atraumatic.  PERRL. EOMI. No conjunctival injection. No icterus. His craniotomy incision has no erythema or drainage  NECK: Supple  HEART: RRR; No murmur,  rubs, gallops.   LUNGS: Clear to auscultation bilaterally without wheezing, rales, rhonchi. Normal respiratory effort. Nonlabored.  ABDOMEN: Soft, nontender, nondistended.. No rebound or guarding. NO mass or HSM  :  Without Bailey catheter.  MSK: No joint effusions or erythema  SKIN: Warm and dry without cutaneous eruptions on Inspection/palpation.    NEURO: Oriented to PPT.  Motor 5/5 strength  PSYCHIATRIC: Normal insight and judgment. Cooperative with PE    Laboratory Data    Results from last 7 days   Lab Units 04/12/23  0542 04/09/23  0640 04/08/23  0538   WBC 10*3/mm3 12.87* 9.66 9.94   HEMOGLOBIN g/dL 14.9 15.3 17.2   HEMATOCRIT % 43.3 43.9 47.4   PLATELETS 10*3/mm3 147 145 168     Results from last 7 days   Lab Units 04/09/23  0640   SODIUM mmol/L 144   POTASSIUM mmol/L 3.7   CHLORIDE mmol/L 107   CO2 mmol/L 26.0   BUN mg/dL 16   CREATININE mg/dL 0.80   GLUCOSE mg/dL 97   CALCIUM mg/dL 9.3     Results from last 7 days   Lab Units 04/07/23  1053   ALK PHOS U/L 48   BILIRUBIN mg/dL 0.5   ALT (SGPT) U/L 27   AST (SGOT) U/L 24             Results from last 7 days   Lab Units 04/07/23  1826   LACTATE mmol/L 1.7             Estimated Creatinine Clearance: 157.8 mL/min (by C-G formula based on SCr of 0.8 mg/dL).      Microbiology:  Microbiology Results (last 10 days)     Procedure Component Value - Date/Time    Tissue / Bone Culture - Tissue, Brain [[id number]] Collected: 04/11/23 1005    Lab Status: Preliminary result Specimen: Tissue from Brain Updated: 04/11/23 1332     Gram Stain Rare (1+) WBCs seen      No organisms seen    Meningitis / Encephalitis Panel, PCR - Cerebrospinal Fluid, Lumbar Puncture [[id number]]  (Normal) Collected: 04/07/23 1418    Lab Status: Final result Specimen: Cerebrospinal Fluid from Lumbar Puncture Updated: 04/07/23 1553     ESCHERICHIA COLI K1, PCR Not Detected     HAEMOPHILUS INFLUENZAE, PCR Not Detected     LISTERIA MONOCYTOGENES, PCR Not Detected     NEISSERIA MENINGITIDIS, PCR Not  Detected     STREPTOCOCCUS AGALACTIAE, PCR Not Detected     STREPTOCOCCUS PNEUMONIAE, PCR Not Detected     CYTOMEGALOVIRUS (CMV), PCR Not Detected     ENTEROVIRUS, PCR Not Detected     HERPES SIMPLEX VIRUS 1 (HSV-1), PCR Not Detected     HERPES SIMPLEX VIRUS 2 (HSV-2), PCR Not Detected     HUMAN PARECHOVIRUS, PCR Not Detected     VARICELLA ZOSTER VIRUS (VZV), PCR Not Detected     CRYPTOCOCCUS NEOFORMANS / GATTII, PCR Not Detected     HUMAN HERPES VIRUS 6 PCR Not Detected    Culture, CSF - Cerebrospinal Fluid, Lumbar Puncture [[id number]] Collected: 04/07/23 1304    Lab Status: Final result Specimen: Cerebrospinal Fluid from Lumbar Puncture Updated: 04/10/23 0652     CSF Culture No growth at 3 days     Gram Stain No WBCs or organisms seen    Cryptococcal AG, CSF - Cerebrospinal Fluid, Lumbar Puncture [[id number]]  (Normal) Collected: 04/07/23 1304    Lab Status: Final result Specimen: Cerebrospinal Fluid from Lumbar Puncture Updated: 04/09/23 0734     Cryptococcal Antigen, CSF Negative    Blood Culture - Blood, Hand, Right [[id number]]  (Normal) Collected: 04/07/23 1243    Lab Status: Preliminary result Specimen: Blood from Hand, Right Updated: 04/11/23 1300     Blood Culture No growth at 4 days    Blood Culture - Blood, Hand, Left [[id number]]  (Normal) Collected: 04/07/23 1243    Lab Status: Preliminary result Specimen: Blood from Hand, Left Updated: 04/11/23 1300     Blood Culture No growth at 4 days    Respiratory Panel PCR w/COVID-19(SARS-CoV-2) JESSY/TAMMY/ULISES/PAD/COR/MAD/WALESKA In-House, NP Swab in UTM/VTM, 3-4 HR TAT - Swab, Nasopharynx [[id number]]  (Normal) Collected: 04/07/23 1227    Lab Status: Final result Specimen: Swab from Nasopharynx Updated: 04/07/23 1349     ADENOVIRUS, PCR Not Detected     Coronavirus 229E Not Detected     Coronavirus HKU1 Not Detected     Coronavirus NL63 Not Detected     Coronavirus OC43 Not Detected     COVID19 Not Detected     Human Metapneumovirus Not Detected     Human  Rhinovirus/Enterovirus Not Detected     Influenza A PCR Not Detected     Influenza B PCR Not Detected     Parainfluenza Virus 1 Not Detected     Parainfluenza Virus 2 Not Detected     Parainfluenza Virus 3 Not Detected     Parainfluenza Virus 4 Not Detected     RSV, PCR Not Detected     Bordetella pertussis pcr Not Detected     Bordetella parapertussis PCR Not Detected     Chlamydophila pneumoniae PCR Not Detected     Mycoplasma pneumo by PCR Not Detected    Narrative:      In the setting of a positive respiratory panel with a viral infection PLUS a negative procalcitonin without other underlying concern for bacterial infection, consider observing off antibiotics or discontinuation of antibiotics and continue supportive care. If the respiratory panel is positive for atypical bacterial infection (Bordetella pertussis, Chlamydophila pneumoniae, or Mycoplasma pneumoniae), consider antibiotic de-escalation to target atypical bacterial infection.                Radiology:  Imaging Results (Last 72 Hours)     Procedure Component Value Units Date/Time    CT Head Without Contrast [[id number]] Collected: 04/11/23 1243     Updated: 04/11/23 1258    Narrative:      CT HEAD WO CONTRAST    Date of Exam: 4/11/2023 12:24 PM EDT    Indication: Post Op Craniotomy Evaluation.    Comparison: 4/7/2023    Technique: Axial CT images were obtained of the head without contrast administration.  Reconstructed coronal and sagittal images were also obtained. Automated exposure control and iterative construction methods were used.    Findings:  Parenchyma:No acute intraparenchymal hemorrhage. No loss of gray-white differentiation to suggest large territory infarct. Normal parenchymal volume. No substantial white matter disease. No midline shift or herniation. A few foci of gas within an area of   heterogeneous brain parenchyma and the left basal ganglia consistent with biopsy of known brain neoplasm.    Ventricles and extra axial spaces:Normal  caliber of ventricles and sulci. No extra axial fluid collection seen. Small amount of free air within the extra-axial spaces from recent biopsy.    Other:Orbits are grossly intact. Paranasal sinuses are clear. Mastoid air cells are clear. Postoperative changes of left frontal craniotomy. No substantial intracranial atherosclerotic calcification. Small amount of gas within the left frontal scalp   consistent with recent operation.      Impression:      Impression:  Postoperative changes of left basal ganglia lesion biopsy with few foci of gas seen at the biopsy site as well as a small amount of pneumocephalus.    No evidence of acute intracranial abnormality otherwise.    Electronically Signed: Andrew Augustin    4/11/2023 12:54 PM EDT    Workstation ID: [id number]      I read his radiographic images.  I reviewed the images with the patient and his wife yesterday.      Impression:   1. 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  2. New onset of seizures-on Keppra  3. Leukocytosis/neutrophilia, stress vs infection  4. Lactic acidosis, stress vs other  5. H/o Liver cancer s/p liver transplant 2013 on Tacrolimus.  6. Immunocompromised host.    PLAN/RECOMMENDATIONS:   1.  Brain biopsy pathology-pending  2.  Radiation oncology and medical oncology consultations      Duncan Pradhan MD  4/12/2023  06:29 EDT

## 2023-04-12 NOTE — CONSULTS
Subjective     PROBLEM LIST:  Patient Active Problem List   Diagnosis   • Seizures   • Liver transplanted   • Immunosuppressed status   • G93.89, Brain Bx 04/11/23         CHIEF COMPLAINT: brain tumor    HISTORY OF PRESENT ILLNESS:  The patient is a 47 y.o. male, referred  for a recently diagnosed brain tumor.  He has a history of liver cancer status post liver transplant, on chronic tacrolimus.    He lives in [place] but was here with his wife and child visiting family.  About a week ago he had some confusion and then had a seizure.  He was admitted on April 7 and started on Keppra.  MRI showed a 2.2 cm left basal ganglia lesion with an 8 mm satellite nodule.  Stereotactic biopsy was performed yesterday, with complete resection not possible due to the location.  Preliminary pathology is reported to be glioblastoma.        REVIEW OF SYSTEMS:  A 14 point review of systems was performed and is negative except as noted above.    Past Medical History:   Diagnosis Date   • Liver cirrhosis     liver transplant 2013       No current facility-administered medications on file prior to encounter.     Current Outpatient Medications on File Prior to Encounter   Medication Sig Dispense Refill   • aspirin 81 MG EC tablet Take 1 tablet by mouth Daily. OTC     • ibuprofen (ADVIL,MOTRIN) 200 MG tablet Take 2 tablets by mouth Every 6 (Six) Hours As Needed for Mild Pain (chronic low back pain).     • multivitamin with minerals tablet tablet Take 1 tablet by mouth Daily. OTC     • tacrolimus (PROGRAF) 0.5 MG capsule Take 3 capsules by mouth 2 (Two) Times a Day.         No Known Allergies    Past Surgical History:   Procedure Laterality Date   • CRANIOTOMY FOR TUMOR N/A 4/11/2023    Procedure: CRANIOTOMY WITH BIOPSY;  Surgeon: Gonzales Gibbons MD;  Location: [facility];  Service: Neurosurgery;  Laterality: N/A;         Social History     Socioeconomic History   • Marital status:    Tobacco Use   • Smoking status: Never      Passive exposure: Never   • Smokeless tobacco: Never   Vaping Use   • Vaping Use: Never used   Substance and Sexual Activity   • Alcohol use: Never   • Drug use: Never       History reviewed. No pertinent family history.    Objective     Vitals:    04/12/23 1300 04/12/23 1400 04/12/23 1500 04/12/23 1600   BP: 132/93 131/80 115/76 117/73   BP Location:  Right arm  Right arm   Patient Position:  Sitting  Lying   Pulse: 103 78 107 86   Resp:  18  18   Temp:       TempSrc:       SpO2: 96% 95% 97% 96%   Weight:       Height:                       Performance Status: 1  General: well appearing male in no acute distress  Neuro: alert and oriented  HEENT: sclerae anicteric.  Left frontal incision with stitches, no drainage or erythema  Lymphatics: no cervical, supraclavicular, or axillary adenopathy  Extremities: no lower extremity edema  Skin: no rashes, lesions, bruising, or petechiae  Psych: mood and affect appropriate    Lab Results   Component Value Date    WBC 12.87 (H) 04/12/2023    HGB 14.9 04/12/2023    HCT 43.3 04/12/2023    MCV 88.5 04/12/2023     04/12/2023     Lab Results   Component Value Date    GLUCOSE 132 (H) 04/12/2023    BUN 16 04/12/2023    CREATININE 0.70 (L) 04/12/2023    BCR 22.9 04/12/2023    K 4.1 04/12/2023    CO2 27.0 04/12/2023    CALCIUM 8.7 04/12/2023    ALBUMIN 4.2 04/07/2023    AST 24 04/07/2023    ALT 27 04/07/2023     CT Head Without Contrast  Narrative: CT HEAD WO CONTRAST    Date of Exam: 4/11/2023 12:24 PM EDT    Indication: Post Op Craniotomy Evaluation.    Comparison: 4/7/2023    Technique: Axial CT images were obtained of the head without contrast administration.  Reconstructed coronal and sagittal images were also obtained. Automated exposure control and iterative construction methods were used.    Findings:  Parenchyma:No acute intraparenchymal hemorrhage. No loss of gray-white differentiation to suggest large territory infarct. Normal parenchymal volume. No substantial  white matter disease. No midline shift or herniation. A few foci of gas within an area of   heterogeneous brain parenchyma and the left basal ganglia consistent with biopsy of known brain neoplasm.    Ventricles and extra axial spaces:Normal caliber of ventricles and sulci. No extra axial fluid collection seen. Small amount of free air within the extra-axial spaces from recent biopsy.    Other:Orbits are grossly intact. Paranasal sinuses are clear. Mastoid air cells are clear. Postoperative changes of left frontal craniotomy. No substantial intracranial atherosclerotic calcification. Small amount of gas within the left frontal scalp   consistent with recent operation.  Impression: Impression:  Postoperative changes of left basal ganglia lesion biopsy with few foci of gas seen at the biopsy site as well as a small amount of pneumocephalus.    No evidence of acute intracranial abnormality otherwise.    Electronically Signed: Andrew Augustin    4/11/2023 12:54 PM EDT    Workstation ID: [id number]          Assessment & Plan     Cheo Garrett is a 47 y.o. 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    We reviewed the usual treatment protocol for glioblastoma, including 6 weeks of chemoradiation with Temodar, followed by Temodar maintenance.  In most cases the molecular genetic testing does not impact this initial treatment for high-grade tumors.  We discussed that clinical trials are always a consideration.  Both [place] and [place] are places that are drivable distance for him and it may be worth looking at opportunities there.    We discussed expected response to treatment and possible prognosis with brain tumors, including the fact that these do tend to be very aggressive tumors and that most patients may only have survival in the range of 1 to 2 years.    He is currently planning to follow-up with oncology and his hometown.  We discussed that he will likely need to continue antiseizure medication  indefinitely.    Plan to discharge this afternoon.  I am available for further questions as needed.           Alicia He MD    4/12/2023

## 2023-04-12 NOTE — CASE MANAGEMENT/SOCIAL WORK
Continued Stay Note  [facility]     Patient Name: Cheo Garrett  MRN: [id number]  Today's Date: 4/12/2023    Admit Date: 4/7/2023    Plan: Home   Discharge Plan     Row Name 04/12/23 1113       Plan    Plan Home    Patient/Family in Agreement with Plan other (see comments)    Plan Comments Pt is being discharged home today to [place], where he is from. Pt had a biopsy done on 4/11/23. Pt requested a second opinion from Oncology. Oncology consult placed today. No needs voiced or identified.    Final Discharge Disposition Code 01 - home or self-care               Discharge Codes    No documentation.               Expected Discharge Date and Time     Expected Discharge Date Expected Discharge Time    Apr 12, 2023             Malika Duarte RN

## 2023-04-13 LAB — T GONDII DNA SPEC QL NAA+PROBE: NEGATIVE

## 2023-04-13 NOTE — OUTREACH NOTE
Prep Survey    Flowsheet Row Responses   [facility] facility patient discharged from? [facility]   Is LACE score < 7 ? No   Eligibility Readm Mgmt   Discharge diagnosis Brain mass,   Left frontal brain biopsy   Does the patient have one of the following disease processes/diagnoses(primary or secondary)? General Surgery   Does the patient have Home health ordered? No   Is there a DME ordered? No   Prep survey completed? Yes          Praveena CHRISTIE - Registered Nurse

## 2023-04-14 ENCOUNTER — READMISSION MANAGEMENT (OUTPATIENT)
Dept: CALL CENTER | Facility: HOSPITAL | Age: 48
End: 2023-04-14
Payer: COMMERCIAL

## 2023-04-14 LAB
BACTERIA SPEC AEROBE CULT: NORMAL
GRAM STN SPEC: NORMAL
GRAM STN SPEC: NORMAL

## 2023-04-14 NOTE — OUTREACH NOTE
General Surgery Week 1 Survey    Flowsheet Row Responses   [facility] facility patient discharged from? [facility]   Does the patient have one of the following disease processes/diagnoses(primary or secondary)? General Surgery   Week 1 attempt successful? No   Unsuccessful attempts Attempt 1          Angela BUSTILLO - Registered Nurse

## 2023-04-16 LAB — BACTERIA SPEC ANAEROBE CULT: NORMAL

## 2023-04-17 ENCOUNTER — TELEPHONE (OUTPATIENT)
Dept: NEUROSURGERY | Facility: CLINIC | Age: 48
End: 2023-04-17
Payer: COMMERCIAL

## 2023-04-17 NOTE — PAYOR COMM NOTE
"auth # [id number]  Cheo Garrett (47 y.o. Male)     Date of Birth   1975    Social Security Number       Address   [facility] [address]    Home Phone   [phone]    MRN   [id number]       [employer]ec[employer]   [facility]    Marital Status                               Admission Date   4/7/23    Admission Type   Emergency    Admitting Provider       Attending Provider       Department, Room/Bed   [facility] 2B ICU, N230/1       Discharge Date   4/12/2023    Discharge Disposition   Home or Self Care    Discharge Destination                               Attending Provider: (none)   Allergies: No Known Allergies    Isolation: None   Infection: None   Code Status: Prior    Ht: 193 cm (75.98\")   Wt: 109 kg (239 lb 12.8 oz)    Admission Cmt: None   Principal Problem: G93.89, Brain Bx 04/11/23 [G93.89]                 Active Insurance as of 4/7/2023     Primary Coverage     Payor Plan Insurance Group Employer/Plan Group    MISC COMMERCIAL MISC COMMERCIAL [id number]     Coverage Address Coverage Phone Number Coverage Fax Number Effective Dates    [address] [phone]  3/7/2023 - None Entered    [facility] [address]       Subscriber Name Subscriber Birth Date Member ID       CHEO GARRETT 1975 [id number]                 Emergency Contacts      (Rel.) Home Phone Work Phone Mobile Phone    Noreen Garrett (Spouse) -- -- [phone]               Physician Progress Notes (all)      Donis Briseno MD at 04/12/23 1659              INTENSIVIST   PROGRESS NOTE     Subjective   SUBJECTIVE     Cheo 47 y.o. male is followed for: Altered Mental Status       G93.89, Brain Bx 04/11/23    Seizures    Liver transplanted    Immunosuppressed status    As an Intensivist, we provide an integrated approach to the ICU patient and family, medical management of comorbid conditions, including but not limited to electrolytes, glycemic control, organ dysfunction, lead interdisciplinary rounds " and coordinate the care with all other services, including those from other specialists.     Interval History:  POD: 1 Day Post-Op    Doing well.    Headaches on/off.    On Ambient air.    They want to talk to Oncology service here to get more information before going back home. ([place]).    Temp  Min: 97.2 °F (36.2 °C)  Max: 98 °F (36.7 °C)       History     Last Reviewed by Donis Briseno MD on 2023 at  3:25 PM    Sections Reviewed    Medical, Family, Tobacco, Alcohol, Drug Use, Sexual Activity, Social   Documentation      Medicines reviewed by Donis Briseno MD on 2023 at  3:24 PM  Allergies reviewed by Donis Briseno MD on 2023 at  3:24 PM       The patient's relevant past medical, surgical and social history were reviewed and updated in Epic as appropriate.     Objective   OBJECTIVE     Vitals:  Temp: 97.2 °F (36.2 °C) (23 0900) Temp  Min: 97.2 °F (36.2 °C)  Max: 98 °F (36.7 °C)   Temp core:      BP: 117/73 (23 1600) BP  Min: 115/76  Max: 145/96   MAP (non-invasive) Noninvasive MAP (mmHg): 80 (23 1600) Noninvasive MAP (mmHg)  Av.9  Min: 80  Max: 114   Pulse: 86 (23 1600) Pulse  Min: 63  Max: 107   Resp: 18 (23 1600) Resp  Min: 16  Max: 18   SpO2: 96 % (23 1600) SpO2  Min: 92 %  Max: 98 %   Device: room air (23 1600)    Flow Rate: 2 (23 1200) No data recorded         23  1115 23  1617   Weight: 111 kg (245 lb) 109 kg (239 lb 12.8 oz)        Intake/Ouptut 24 hrs (7:00AM - 6:59 AM)  Intake & Output (last 3 days)        0701  04/10 0700 04/10 0701   0700  0701   07 07 07    P.O.  240      Total Intake(mL/kg)  240 (2.2)      Urine (mL/kg/hr)   650 (0.2)     Total Output   650     Net  +240 -650             Urine Unmeasured Occurrence  2 x            Medications (drips):  niCARdipine, Last Rate: Stopped (23 1305)      Physical Examination  Telemetry:  Rhythm: normal sinus rhythm  (04/12/23 1600)         Constitutional:  No acute distress.   Cardiovascular: RRR.    Respiratory: Normal breath sounds  No adventitious sounds   Abdominal:  Soft with no tenderness.   Extremities: No Edema   Neurological:   Alert, Oriented, Cooperative.  Best Eye Response: 4-->(E4) spontaneous (04/12/23 1600)  Best Motor Response: 6-->(M6) obeys commands (04/12/23 1600)  Best Verbal Response: 5-->(V5) oriented (04/12/23 1600)  Jocelin Coma Scale Score: 15 (04/12/23 1600)     Total (NIH Stroke Scale): 0 (04/11/23 1900)       Results Reviewed:  Laboratory  Microbiology  Radiology  Pathology    Hematology:  Results from last 7 days   Lab Units 04/12/23  0542 04/09/23  0640 04/08/23  0538   WBC 10*3/mm3 12.87* 9.66 9.94   NEUTROS ABS 10*3/mm3 9.48* 5.92 8.20*   IMM GRAN % % 0.4 0.9* 0.4   LYMPHS ABS 10*3/mm3 1.65 2.39 1.30   MONOS ABS 10*3/mm3 1.61* 1.15* 0.38   EOS ABS 10*3/mm3 0.06 0.05 0.00   BASOS ABS 10*3/mm3 0.02 0.06 0.02   HEMOGLOBIN g/dL 14.9 15.3 17.2   MCV fL 88.5 86.6 85.3   RDW % 12.6 12.1* 11.8*   PLATELETS 10*3/mm3 147 145 168       Chemistry:  Estimated Creatinine Clearance: 180.4 mL/min (A) (by C-G formula based on SCr of 0.7 mg/dL (L)).    Results from last 7 days   Lab Units 04/12/23  0647 04/09/23  0640   SODIUM mmol/L 141 144   POTASSIUM mmol/L 4.1 3.7   CHLORIDE mmol/L 107 107   CO2 mmol/L 27.0 26.0   BUN mg/dL 16 16   CREATININE mg/dL 0.70* 0.80   GLUCOSE mg/dL 132* 97     Results from last 7 days   Lab Units 04/12/23  0647 04/09/23  0640 04/08/23  0538 04/07/23  1053   CALCIUM mg/dL 8.7 9.3   < > 8.8   MAGNESIUM mg/dL  --   --   --  1.9    < > = values in this interval not displayed.     COVID-19  Lab Results   Component Value Date    COVID19 Not Detected 04/07/2023       Images:  EEG    Result Date: 4/7/2023  Diffuse cerebral dysfunction of mild degree but nonspecific No ongoing seizures are seen This report is transcribed using the Dragon dictation system.      CT Abdomen Pelvis With & Without  Contrast    Result Date: 4/8/2023  Impression: 1. No findings of primary malignancy or metastatic adenopathy in the chest, abdomen, or pelvis. 2. Post surgical changes of liver transplant. 3. Wall thickening at mid and distal esophagus, correlate for findings of reflux/esophagitis. 4. Additional incidental chronic findings above. Electronically Signed: [place] Colemanbaltazar  4/8/2023 8:52 PM EDT  Workstation ID: [id number]    CT Head Without Contrast    Result Date: 4/11/2023  Impression: Postoperative changes of left basal ganglia lesion biopsy with few foci of gas seen at the biopsy site as well as a small amount of pneumocephalus. No evidence of acute intracranial abnormality otherwise. Electronically Signed: Andrew Augustin  4/11/2023 12:54 PM EDT  Workstation ID: [id number]    CT Head Without Contrast    Result Date: 4/7/2023  Impression: Faint hypodensity is seen within the left basal ganglia, likely either chronic lacunar infarct or prominent perivascular space. No acute intracranial abnormality otherwise. Electronically Signed: Christiano Nino  4/7/2023 12:15 PM EDT  Workstation ID: [id number]    CT Chest With & Without Contrast Diagnostic    Result Date: 4/8/2023  Impression: 1. No findings of primary malignancy or metastatic adenopathy in the chest, abdomen, or pelvis. 2. Post surgical changes of liver transplant. 3. Wall thickening at mid and distal esophagus, correlate for findings of reflux/esophagitis. 4. Additional incidental chronic findings above. Electronically Signed: [place] Colemanbaltazar  4/8/2023 8:52 PM EDT  Workstation ID: [id number]    MRI Brain With & Without Contrast    Result Date: 4/7/2023  Impression: 2.2 cm enhancing and partially cystic mass of the left basal ganglia as above, with surrounding edema extending towards the insular and left temporal regions, favoring primary glial neoplasm over metastasis. Recommend neurosurgical consultation. Electronically Signed: Christiano Nino  4/7/2023 4:32 PM EDT  Workstation  ID: [id number]    XR Chest 1 View    Result Date: 4/7/2023  No evidence of acute disease in the chest. Electronically Signed: Christiano Nino  4/7/2023 11:31 AM EDT  Workstation ID: [id number]      Results: Reviewed.  I reviewed the patient's new laboratory and imaging results.  I independently reviewed the patient's new images.    Medications: Reviewed.    Assessment   A/P     Hospital:  LOS: 5 days   ICU: 1d 4h      Diagnosis   • **G93.89, Brain Bx 04/11/23 [G93.89]   • Seizures [R56.9]   • Liver transplanted [Z94.4]   • Immunosuppressed status [D84.9]     Cheo is a 47 y.o. male admitted on 4/7/2023  for evaluation after a witnessed seizure at home.     Per report, the patient's wife noted confusion and increased somnolence the week leading up to his generalized seizure when wife called EMS. By the time they arrived, his seizure resolved but remained confused. Upon ED admission, his CSF studies were negative for an infection. He was started on Keppra, and MRI brain obtained, demonstrating a 2.2-cm enhancing and partially cystic mass of the left basal ganglia with surrounding edema extending toward the insular and left temporal region, favoring primary glial neoplasm. Dr. Gibbons with neurosurgery was consulted, recommending biopsy.     Assessment/Management/Treatment Plan:    1. Brain: 2.2 cm enhancing and partially cystic mas of the left basal ganglia with surrounding edema.  S/P Brain Bx 04/11/23   Dr. Gibbons  Frozen: High-grade glial neoplasm    2. Neuro  a. New onset seizure  3. GI/Hepatology  a. S/P Liver Transplant secondary to Liver cancer, 2013 - Immunosuppressive therapy with Tacrolimus.  4. Endocrine  a. Body mass index is 29.2 kg/m². Overweight: 25.0-29.9kg/m2   b. No history of T2 Diabetes    No results found for: HGBA1C     Results from last 7 days   Lab Units 04/07/23  1732   GLUCOSE mg/dL 83       Diet: Diet: Regular/House Diet; Texture: Regular Texture (IDDSI 7); Fluid Consistency: Thin (IDDSI 0)  Orders  Placed This Encounter      DIET MESSAGE Please send cheese pizza,  salad with blue cheese, iced tea, and vanilla ice cream. Thank you :)      Advance Directives: Code Status and Medical Interventions:   Ordered at: 04/11/23 1215     Level Of Support Discussed With:    Patient     Code Status (Patient has no pulse and is not breathing):    CPR (Attempt to Resuscitate)     Medical Interventions (Patient has pulse or is breathing):    Full        DVT prophylaxis:  Mechanical DVT prophylaxis orders are present.     In brief:  1. Radiation Oncology and Oncology consult.  1. Per Dr. Luna, family may choose to have his treatments at the [facility] ([place])   2. Dr. He was also able to talk to him and his family  2. Discussed with patient and family. They live in [place], [place].  1. They need to establish their Oncology Care, nearby where they live ([facility]) or at [facility] ([place]) where he got his liver transplant.  3. Follow up final path.  4. Continue Dexamethasone and Levetiracetam per Neurosurgery.  5. Disposition: Discharge Home today.     Plan of care and goals reviewed during interdisciplinary rounds.  I discussed the patient's findings and my recommendations with patient, family, nursing staff and consulting provider    MDM:    Problem(s) High due to: Acute or Chronic illness or injury that may poses a threat to life or bodily function  Data: High due to: Review of prior external records from each unique source, Review of the result(s) of each unique test and Discuss management or test interpretation with external physician or NPP (not separately reported)     High      [x] Primary Attending Intensive Care Medicine - Nutrition Support   [] Consultant    Copied text in this note has been reviewed and is accurate as of 04/12/23        Electronically signed by Donis Briseno MD at 04/12/23 1027     Emiliano Hightower PA-C at 04/12/23 8673     Attestation signed by Gonzales Gibbons MD  "at 04/13/23 0834    I have reviewed this documentation and agree.    Okay to discharge home from neurosurgery perspective.  Patient is safe to travel back to [place].  Likely diagnosis is glioblastoma which can be treated with conformal radiation and oral chemotherapy agents.                  [facility]    Cheo Garrett  1975  [id number]      G93.89, Brain Bx 04/11/23    Seizures    Liver transplanted    Immunosuppressed status      Admit Diagnosis: Seizure [R56.9]  Seizures [R56.9]  Date of Admission: 4/7/2023 10:43 AM     Interval History: Patient underwent needle biopsy with Dr. Raymond Gibbons yesterday 4/11/2023.  Biopsy showed results concerning for a high-grade glial neoplasm.    Post-Op Day: 1  Surgery Performed: Left frontal brain biopsy    Physical Exam:  Blood pressure 122/80, pulse 68, temperature 97.2 °F (36.2 °C), temperature source Axillary, resp. rate 18, height 193 cm (75.98\"), weight 109 kg (239 lb 12.8 oz), SpO2 98 %.  No intake/output data recorded.  Physical Exam   Patient is laying flat in the bed, he is pleasant, conversational, responds to all commands.  Cranial nerves grossly intact.  Incision is clean, dry, intact.    Data Review:   (All imaging reviewed independently unless state otherwise)  CT Head Without Contrast    Result Date: 4/11/2023  CT HEAD WO CONTRAST Date of Exam: 4/11/2023 12:24 PM EDT Indication: Post Op Craniotomy Evaluation. Comparison: 4/7/2023 Technique: Axial CT images were obtained of the head without contrast administration.  Reconstructed coronal and sagittal images were also obtained. Automated exposure control and iterative construction methods were used. Findings: Parenchyma:No acute intraparenchymal hemorrhage. No loss of gray-white differentiation to suggest large territory infarct. Normal parenchymal volume. No substantial white matter disease. No midline shift or herniation. A few foci of gas within an " area of  heterogeneous brain parenchyma and the left basal ganglia consistent with biopsy of known brain neoplasm. Ventricles and extra axial spaces:Normal caliber of ventricles and sulci. No extra axial fluid collection seen. Small amount of free air within the extra-axial spaces from recent biopsy. Other:Orbits are grossly intact. Paranasal sinuses are clear. Mastoid air cells are clear. Postoperative changes of left frontal craniotomy. No substantial intracranial atherosclerotic calcification. Small amount of gas within the left frontal scalp consistent with recent operation.     Impression: Impression: Postoperative changes of left basal ganglia lesion biopsy with few foci of gas seen at the biopsy site as well as a small amount of pneumocephalus. No evidence of acute intracranial abnormality otherwise. Electronically Signed: Andrew Augustin  4/11/2023 12:54 PM EDT  Workstation ID: [id number]    CT Head Without Contrast    Result Date: 4/7/2023  CT HEAD WO CONTRAST Date of Exam: 4/7/2023 11:50 AM EDT Indication: new onset sz. Comparison: None available. Technique: Axial CT images were obtained of the head without contrast administration.  Reconstructed coronal and sagittal images were also obtained. Automated exposure control and iterative construction methods were used. Findings: Faint hypodensity is seen within the left basal ganglia, likely either chronic lacunar infarct or prominent perivascular space. There is otherwise no evidence of intracranial hemorrhage, mass or mass effect. The ventricles are normal in size and configuration. The orbits are normal. The paranasal sinuses are grossly clear. The calvarium is intact.     Impression: Impression: Faint hypodensity is seen within the left basal ganglia, likely either chronic lacunar infarct or prominent perivascular space. No acute intracranial abnormality otherwise. Electronically Signed: Christiano Nino  4/7/2023 12:15 PM EDT  Workstation ID:  [id number]        Diagnosis:  (R56.9) New onset seizure    (R41.0) Confusion    (D72.829) Leukocytosis, unspecified type    (E87.6) Hypokalemia    (Z74.09) Impaired functional mobility, balance, gait, and endurance    (G93.89) Brain mass - Plan: Case Request, Case Request, Tissue Pathology Exam, Tissue Pathology Exam, Anaerobic Culture - Tissue, Brain, Anaerobic Culture - Tissue, Brain, Fungus Culture - Tissue, Brain, Fungus Culture - Tissue, Brain, Tissue / Bone Culture - Tissue, Brain, Tissue / Bone Culture - Tissue, Brain, - Miscellaneous Test, - Miscellaneous Test  Seizure [R56.9]  Seizures [R56.9]    Medical Decision Making:   Patient's biopsy results were concerning for high-grade glial neoplasm and the next steps will be radiation and oncology consultations.  There is some concern about where they would like to establish care as they live in [place], [place].  Patient and wife would at least like to see what the pathway of oncology care here would look like.  Please keep the patient on his Keppra as well as his Decadron.  No role for further imaging at this time unless clinically indicated.  Patient is cleared to be discharged home from a neurosurgical point of view.    Emiliano Hightower PA-C  4/12/2023  Surgeon: Gonzales Gibbons MD      Electronically signed by Gonzales Gibbons MD at 04/13/23 0834     Duncan Pradhan MD at 04/12/23 0629              INFECTIOUS DISEASE PROGRESS NOTE    Cheo Garrett  1975  [id number]    Date of Consult: 4/8/2023    Admission Date: 4/7/2023      Requesting Provider: Roxanne Ivreson DO  Evaluating Physician: Duncan Pradhan MD    Reason for Consultation: immunocompromised, brain lesion    History of present illness:    4/8/23: Patient is a 47 y.o. male with h/o liver cancer and liver transplantation 2013 at [facility]/on Tacrolimus who we were asked to see for possible infectious brain lesion.  The patient was brought to [facility] ED on 4/7 by EMS after his wife witness  the patient having a seizure at her family's home. They are from [place], but are visiting family near [place], [place].   The patient has no h/o seizures and has been relatively healthy since his liver transplantation.   He states he does no remember leading up to his being taken to [facility].  On arrival, the patient is afebrile.  Admitting labs are WBC 18,200 with 86% neutrophils, lactic acid 6.4, PCT 0.03, Creatinine 1.08, ammonia 49, and UA WBC 0-2.  A UDS was negative.  A respiratory panel PCR was negative.  Blood cultures are negative to date. A CXR showed no acute findings.  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  He underwent an LP yesterday with WBC CSF zero, glucose 88, and protein 30.7. A Meningitis/encephalitis panel PCR was negative.  A toxoplasma PCR from CSF is pending.  A CSF culture is negative to date. He is currently on no antibiotics. ID was asked to evaluate.  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     4/9/23:He denies headache, earache, sore throat.  He has remained afebrile.  He has had no further seizures.His white blood cell count today is 9.7.  His creatinine is 0.8.  Serum cryptococcal antigen was negative.  He denies nausea, vomiting, diarrhea.    4/10/23: He has remained afebrile. CSF and serum cryptococcal antigens were negative. Toxoplasma serology is pending.  QuantiFERON Gold TB test is pending.  Urinary histoplasma Blastomyces antigens are pending. Brain biopsy is scheduled for tomorrow.  He complains of a mild headache.  Denies nausea and vomiting.    4/11/23: He has remained afebrile.  Toxoplasma serology is negative. Toxoplasma CSF PCR is pending. QuantiFERON Gold TB test is pending. 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    4/12/23:He has remained afebrile.  His white  blood cell count is 12.9.  His QuantiFERON Gold TB test was negative.He denies severe headache.  He is planning to discharge today.  He may initially go to his wife's family's home in [place] and then back to [place].  He is meeting with radiation oncology and medical ooncology today.      Past Medical History:   Diagnosis Date   • Liver cirrhosis     liver transplant 2013       History reviewed. No pertinent surgical history.   Liver transplantation 2013    History reviewed. No pertinent family history.    Social History     Socioeconomic History   • Marital status:    Tobacco Use   • Smoking status: Never     Passive exposure: Never   • Smokeless tobacco: Never   Vaping Use   • Vaping Use: Never used   Substance and Sexual Activity   • Alcohol use: Never   • Drug use: Never   Denies tobacco, alcohol, or illicit drug use.   Lives in    No Known Allergies      Medication:    Current Facility-Administered Medications:   •  acetaminophen (TYLENOL) tablet 650 mg, 650 mg, Oral, Q4H PRN, Emiliano Hightower PA-C, 650 mg at 04/11/23 1829  •  docusate sodium (COLACE) capsule 100 mg, 100 mg, Oral, BID PRN, Emiliano Hightower PA-C  •  HYDROmorphone (DILAUDID) injection 0.5 mg, 0.5 mg, Intravenous, Q2H PRN, 0.5 mg at 04/11/23 2003 **AND** naloxone (NARCAN) injection 0.4 mg, 0.4 mg, Intravenous, Q5 Min PRN, Emiliano Hightower PA-C  •  levETIRAcetam (KEPPRA) tablet 1,000 mg, 1,000 mg, Oral, Q12H, Emiliano Hightower PA-C, 1,000 mg at 04/11/23 2004  •  LORazepam (ATIVAN) injection 2 mg, 2 mg, Intravenous, Q5 Min PRN, Emiliano Hightower PA-C  •  magnesium hydroxide (MILK OF MAGNESIA) suspension 10 mL, 10 mL, Oral, Daily PRN, Emiliano Hightower PA-C  •  niCARdipine (CARDENE) 25mg in 250mL NS infusion, 5-15 mg/hr, Intravenous, Titrated, Emiliano Hightower PA-C, Held at 04/11/23 1305  •  [DISCONTINUED] ondansetron (ZOFRAN) tablet 4 mg, 4 mg, Oral, Q6H PRN **OR** ondansetron (ZOFRAN) injection 4 mg, 4 mg, Intravenous, Q6H PRN, Emiliano Hightower PA-C  •   oxyCODONE-acetaminophen (PERCOCET) 5-325 MG per tablet 1 tablet, 1 tablet, Oral, Q6H PRN, Donis Briseno MD, 1 tablet at 23 0553  •  polyethylene glycol (MIRALAX) packet 17 g, 17 g, Oral, Daily PRN, Emiliano Hightower PA-C  •  Potassium Replacement - Follow Nurse / BPA Driven Protocol, , Does not apply, PRN, Emiliano Hightower PA-C  •  sennosides-docusate (PERICOLACE) 8.6-50 MG per tablet 1 tablet, 1 tablet, Oral, Nightly PRN, Emiliano Hightower PA-C  •  tacrolimus (PROGRAF) capsule 1.5 mg, 1.5 mg, Oral, Q12H, Emiliano Hightower PA-C, 1.5 mg at 23    Antibiotics:  Anti-Infectives (From admission, onward)    Ordered     Dose/Rate Route Frequency Start Stop    23 0739  ceFAZolin in dextrose (ANCEF) IVPB solution 2 g        Ordering Provider: Emiliano Hightower PA-C    2 g  over 30 Minutes Intravenous Once 23 0830 23 0835            Review of Systems:  See above      Physical Exam:   Vital Signs  Temp (24hrs), Av.5 °F (36.4 °C), Min:97 °F (36.1 °C), Max:98.1 °F (36.7 °C)    Temp  Min: 97 °F (36.1 °C)  Max: 98.1 °F (36.7 °C)  BP  Min: 122/81  Max: 148/96  Pulse  Min: 63  Max: 93  Resp  Min: 14  Max: 18  SpO2  Min: 93 %  Max: 100 %    GENERAL: Awake and alert, in no acute distress.   HEENT: Normocephalic, atraumatic.  PERRL. EOMI. No conjunctival injection. No icterus. His craniotomy incision has no erythema or drainage  NECK: Supple  HEART: RRR; No murmur, rubs, gallops.   LUNGS: Clear to auscultation bilaterally without wheezing, rales, rhonchi. Normal respiratory effort. Nonlabored.  ABDOMEN: Soft, nontender, nondistended.. No rebound or guarding. NO mass or HSM  :  Without Bailey catheter.  MSK: No joint effusions or erythema  SKIN: Warm and dry without cutaneous eruptions on Inspection/palpation.    NEURO: Oriented to PPT.  Motor 5/5 strength  PSYCHIATRIC: Normal insight and judgment. Cooperative with PE    Laboratory Data    Results from last 7 days   Lab Units 23  0542 23  0600  04/08/23  0538   WBC 10*3/mm3 12.87* 9.66 9.94   HEMOGLOBIN g/dL 14.9 15.3 17.2   HEMATOCRIT % 43.3 43.9 47.4   PLATELETS 10*3/mm3 147 145 168     Results from last 7 days   Lab Units 04/09/23  0640   SODIUM mmol/L 144   POTASSIUM mmol/L 3.7   CHLORIDE mmol/L 107   CO2 mmol/L 26.0   BUN mg/dL 16   CREATININE mg/dL 0.80   GLUCOSE mg/dL 97   CALCIUM mg/dL 9.3     Results from last 7 days   Lab Units 04/07/23  1053   ALK PHOS U/L 48   BILIRUBIN mg/dL 0.5   ALT (SGPT) U/L 27   AST (SGOT) U/L 24             Results from last 7 days   Lab Units 04/07/23  1826   LACTATE mmol/L 1.7             Estimated Creatinine Clearance: 157.8 mL/min (by C-G formula based on SCr of 0.8 mg/dL).      Microbiology:  Microbiology Results (last 10 days)     Procedure Component Value - Date/Time    Tissue / Bone Culture - Tissue, Brain [[id number]] Collected: 04/11/23 1005    Lab Status: Preliminary result Specimen: Tissue from Brain Updated: 04/11/23 1332     Gram Stain Rare (1+) WBCs seen      No organisms seen    Meningitis / Encephalitis Panel, PCR - Cerebrospinal Fluid, Lumbar Puncture [[id number]]  (Normal) Collected: 04/07/23 1418    Lab Status: Final result Specimen: Cerebrospinal Fluid from Lumbar Puncture Updated: 04/07/23 1553     ESCHERICHIA COLI K1, PCR Not Detected     HAEMOPHILUS INFLUENZAE, PCR Not Detected     LISTERIA MONOCYTOGENES, PCR Not Detected     NEISSERIA MENINGITIDIS, PCR Not Detected     STREPTOCOCCUS AGALACTIAE, PCR Not Detected     STREPTOCOCCUS PNEUMONIAE, PCR Not Detected     CYTOMEGALOVIRUS (CMV), PCR Not Detected     ENTEROVIRUS, PCR Not Detected     HERPES SIMPLEX VIRUS 1 (HSV-1), PCR Not Detected     HERPES SIMPLEX VIRUS 2 (HSV-2), PCR Not Detected     HUMAN PARECHOVIRUS, PCR Not Detected     VARICELLA ZOSTER VIRUS (VZV), PCR Not Detected     CRYPTOCOCCUS NEOFORMANS / GATTII, PCR Not Detected     HUMAN HERPES VIRUS 6 PCR Not Detected    Culture, CSF - Cerebrospinal Fluid, Lumbar Puncture [[id number]]  Collected: 04/07/23 1304    Lab Status: Final result Specimen: Cerebrospinal Fluid from Lumbar Puncture Updated: 04/10/23 0652     CSF Culture No growth at 3 days     Gram Stain No WBCs or organisms seen    Cryptococcal AG, CSF - Cerebrospinal Fluid, Lumbar Puncture [[id number]]  (Normal) Collected: 04/07/23 1304    Lab Status: Final result Specimen: Cerebrospinal Fluid from Lumbar Puncture Updated: 04/09/23 0734     Cryptococcal Antigen, CSF Negative    Blood Culture - Blood, Hand, Right [[id number]]  (Normal) Collected: 04/07/23 1243    Lab Status: Preliminary result Specimen: Blood from Hand, Right Updated: 04/11/23 1300     Blood Culture No growth at 4 days    Blood Culture - Blood, Hand, Left [[id number]]  (Normal) Collected: 04/07/23 1243    Lab Status: Preliminary result Specimen: Blood from Hand, Left Updated: 04/11/23 1300     Blood Culture No growth at 4 days    Respiratory Panel PCR w/COVID-19(SARS-CoV-2) JESSY/TAMMY/ULISES/PAD/COR/MAD/WALESKA In-House, NP Swab in UTM/VTM, 3-4 HR TAT - Swab, Nasopharynx [[id number]]  (Normal) Collected: 04/07/23 1227    Lab Status: Final result Specimen: Swab from Nasopharynx Updated: 04/07/23 1349     ADENOVIRUS, PCR Not Detected     Coronavirus 229E Not Detected     Coronavirus HKU1 Not Detected     Coronavirus NL63 Not Detected     Coronavirus OC43 Not Detected     COVID19 Not Detected     Human Metapneumovirus Not Detected     Human Rhinovirus/Enterovirus Not Detected     Influenza A PCR Not Detected     Influenza B PCR Not Detected     Parainfluenza Virus 1 Not Detected     Parainfluenza Virus 2 Not Detected     Parainfluenza Virus 3 Not Detected     Parainfluenza Virus 4 Not Detected     RSV, PCR Not Detected     Bordetella pertussis pcr Not Detected     Bordetella parapertussis PCR Not Detected     Chlamydophila pneumoniae PCR Not Detected     Mycoplasma pneumo by PCR Not Detected    Narrative:      In the setting of a positive respiratory panel with a viral infection PLUS a  negative procalcitonin without other underlying concern for bacterial infection, consider observing off antibiotics or discontinuation of antibiotics and continue supportive care. If the respiratory panel is positive for atypical bacterial infection (Bordetella pertussis, Chlamydophila pneumoniae, or Mycoplasma pneumoniae), consider antibiotic de-escalation to target atypical bacterial infection.                Radiology:  Imaging Results (Last 72 Hours)     Procedure Component Value Units Date/Time    CT Head Without Contrast [[id number]] Collected: 04/11/23 1243     Updated: 04/11/23 1258    Narrative:      CT HEAD WO CONTRAST    Date of Exam: 4/11/2023 12:24 PM EDT    Indication: Post Op Craniotomy Evaluation.    Comparison: 4/7/2023    Technique: Axial CT images were obtained of the head without contrast administration.  Reconstructed coronal and sagittal images were also obtained. Automated exposure control and iterative construction methods were used.    Findings:  Parenchyma:No acute intraparenchymal hemorrhage. No loss of gray-white differentiation to suggest large territory infarct. Normal parenchymal volume. No substantial white matter disease. No midline shift or herniation. A few foci of gas within an area of   heterogeneous brain parenchyma and the left basal ganglia consistent with biopsy of known brain neoplasm.    Ventricles and extra axial spaces:Normal caliber of ventricles and sulci. No extra axial fluid collection seen. Small amount of free air within the extra-axial spaces from recent biopsy.    Other:Orbits are grossly intact. Paranasal sinuses are clear. Mastoid air cells are clear. Postoperative changes of left frontal craniotomy. No substantial intracranial atherosclerotic calcification. Small amount of gas within the left frontal scalp   consistent with recent operation.      Impression:      Impression:  Postoperative changes of left basal ganglia lesion biopsy with few foci of gas seen at  the biopsy site as well as a small amount of pneumocephalus.    No evidence of acute intracranial abnormality otherwise.    Electronically Signed: Andrew Augustin    4/11/2023 12:54 PM EDT    Workstation ID: [id number]      I read his radiographic images.  I reviewed the images with the patient and his wife yesterday.      Impression:   1. 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  2. New onset of seizures-on Keppra  3. Leukocytosis/neutrophilia, stress vs infection  4. Lactic acidosis, stress vs other  5. H/o Liver cancer s/p liver transplant 2013 on Tacrolimus.  6. Immunocompromised host.    PLAN/RECOMMENDATIONS:   1.  Brain biopsy pathology-pending  2.  Radiation oncology and medical oncology consultations      Duncan Pradhan MD  4/12/2023  06:29 EDT                    Electronically signed by Duncan Pradhan MD at 04/12/23 1818     Donis Briseno MD at 04/11/23 1505          Clemencia Sales, APRN   APRN NOTE    Cheo Garrett is a 47 y.o. male with PMHx significant only for liver cancer s/p liver transplant at [facility] in 2013 on chronic immunosuppressant therapy who presents to [facility] ED on 04/07/2023 for evaluation after a witnessed seizure at home. Per report, the patient's wife noted confusion and increased somnolence the week leading up to his generalized seizure when wife called EMS. By the time they arrived, his seizure resolved but remained confused. Upon ED admission, labs concerning for meningitis; however, CSF studies benign. He was admitted to Hospital Medicine, started on Keppra, and MRI brain obtained, demonstrating a 2.2-cm enhancing and partially cystic mass of the left basal ganglia with surrounding edema extending toward the insular and left temporal region, favoring primary glial neoplasm. Dr. Gibbons with neurosurgery was consulted, recommending biopsy.     He is seen in the ICU post-operatively.       Split share visit.  APRN Time: I spent 5  minutes.    INTENSIVIST   PROGRESS NOTE     Subjective   SUBJECTIVE     Cheo 47 y.o. male is followed for: Altered Mental Status       G93.89, Brain Bx 23    Seizures    Liver transplanted    Immunosuppressed status    As an Intensivist, we provide an integrated approach to the ICU patient and family, medical management of comorbid conditions, including but not limited to electrolytes, glycemic control, organ dysfunction, lead interdisciplinary rounds and coordinate the care with all other services, including those from other specialists.     Interval History:  POD: Day of Surgery    Doing well post.  Some headaches.  No chest pain.  No respiratory distress.    Temp  Min: 97 °F (36.1 °C)  Max: 98.6 °F (37 °C)       History     Last Reviewed by Donis Briseno MD on 2023 at  3:25 PM    Sections Reviewed    Medical, Family, Tobacco, Alcohol, Drug Use, Sexual Activity, Social   Documentation      Medicines reviewed by Donis Briseno MD on 2023 at  3:24 PM  Allergies reviewed by Donis Briseno MD on 2023 at  3:24 PM       The patient's relevant past medical, surgical and social history were reviewed and updated in Epic as appropriate.     Objective   OBJECTIVE     Vitals:  Temp: 97.7 °F (36.5 °C) (23 1600) Temp  Min: 97 °F (36.1 °C)  Max: 98.6 °F (37 °C)   Temp core:      BP: 122/81 (23 1600) BP  Min: 115/77  Max: 148/96   MAP (non-invasive) Noninvasive MAP (mmHg): 96 (23) Noninvasive MAP (mmHg)  Av.5  Min: 84  Max: 112   Pulse: 76 (23 1600) Pulse  Min: 63  Max: 114   Resp: 16 (23 1600) Resp  Min: 14  Max: 20   SpO2: 95 % (23) SpO2  Min: 95 %  Max: 100 %   Device: room air (23 1600)    Flow Rate: 2 (23 1200) Flow (L/min)  Min: 2  Max: 4         23  1115 23  1617   Weight: 111 kg (245 lb) 109 kg (239 lb 12.8 oz)        Intake/Ouptut 24 hrs (7:00AM - 6:59 AM)  Intake & Output (last 3 days)        0701   07  0701  04/10 0700 04/10 0701 04/11 0700 04/11 0701 04/12 0700    P.O.   240     I.V. (mL/kg)        Total Intake(mL/kg)   240 (2.2)     Urine (mL/kg/hr)    650 (0.6)    Total Output    650    Net   +240 -650            Urine Unmeasured Occurrence   2 x           Medications (drips):  niCARdipine, Last Rate: Stopped (04/11/23 1305)      Physical Examination  Telemetry:  Rhythm: normal sinus rhythm (04/11/23 1600)         Constitutional:  No acute distress.   Cardiovascular: RRR.    Respiratory: Normal breath sounds  No adventitious sounds   Abdominal:  Soft with no tenderness.   Extremities: No Edema   Neurological:   Alert, Oriented, Cooperative.  Best Eye Response: 4-->(E4) spontaneous (04/11/23 1600)  Best Motor Response: 6-->(M6) obeys commands (04/11/23 1600)  Best Verbal Response: 5-->(V5) oriented (04/11/23 1600)  Jocelin Coma Scale Score: 15 (04/11/23 1600)     Total (NIH Stroke Scale): 0 (04/11/23 1235)       Results Reviewed:  Laboratory  Microbiology  Radiology  Pathology    Hematology:  Results from last 7 days   Lab Units 04/09/23  0640 04/08/23  0538 04/07/23  1053   WBC 10*3/mm3 9.66 9.94 18.22*   NEUTROS ABS 10*3/mm3 5.92 8.20* 15.73*   IMM GRAN % % 0.9* 0.4 0.5   LYMPHS ABS 10*3/mm3 2.39 1.30 0.55*   MONOS ABS 10*3/mm3 1.15* 0.38 1.79*   EOS ABS 10*3/mm3 0.05 0.00 0.01   BASOS ABS 10*3/mm3 0.06 0.02 0.04   HEMOGLOBIN g/dL 15.3 17.2 16.6   MCV fL 86.6 85.3 86.7   RDW % 12.1* 11.8* 11.9*   PLATELETS 10*3/mm3 145 168 161       Chemistry:  Estimated Creatinine Clearance: 157.8 mL/min (by C-G formula based on SCr of 0.8 mg/dL).    Results from last 7 days   Lab Units 04/09/23  0640 04/08/23  0538   SODIUM mmol/L 144 140   POTASSIUM mmol/L 3.7 4.0   CHLORIDE mmol/L 107 105   CO2 mmol/L 26.0 23.0   BUN mg/dL 16 13   CREATININE mg/dL 0.80 0.82   GLUCOSE mg/dL 97 153*     Results from last 7 days   Lab Units 04/09/23  0640 04/08/23  0538 04/07/23  1053   CALCIUM mg/dL 9.3 9.4 8.8   MAGNESIUM mg/dL  --   --   1.9     COVID-19  Lab Results   Component Value Date    COVID19 Not Detected 04/07/2023       Images:  EEG    Result Date: 4/7/2023  Diffuse cerebral dysfunction of mild degree but nonspecific No ongoing seizures are seen This report is transcribed using the Dragon dictation system.      CT Abdomen Pelvis With & Without Contrast    Result Date: 4/8/2023  Impression: 1. No findings of primary malignancy or metastatic adenopathy in the chest, abdomen, or pelvis. 2. Post surgical changes of liver transplant. 3. Wall thickening at mid and distal esophagus, correlate for findings of reflux/esophagitis. 4. Additional incidental chronic findings above. Electronically Signed: Juan InnaVirVax  4/8/2023 8:52 PM EDT  Workstation ID: [id number]    CT Head Without Contrast    Result Date: 4/11/2023  Impression: Postoperative changes of left basal ganglia lesion biopsy with few foci of gas seen at the biopsy site as well as a small amount of pneumocephalus. No evidence of acute intracranial abnormality otherwise. Electronically Signed: Andrew Augustin  4/11/2023 12:54 PM EDT  Workstation ID: [id number]    CT Head Without Contrast    Result Date: 4/7/2023  Impression: Faint hypodensity is seen within the left basal ganglia, likely either chronic lacunar infarct or prominent perivascular space. No acute intracranial abnormality otherwise. Electronically Signed: Christiano Nino  4/7/2023 12:15 PM EDT  Workstation ID: [id number]    CT Chest With & Without Contrast Diagnostic    Result Date: 4/8/2023  Impression: 1. No findings of primary malignancy or metastatic adenopathy in the chest, abdomen, or pelvis. 2. Post surgical changes of liver transplant. 3. Wall thickening at mid and distal esophagus, correlate for findings of reflux/esophagitis. 4. Additional incidental chronic findings above. Electronically Signed: Juan InnaVirVax  4/8/2023 8:52 PM EDT  Workstation ID: [id number]    MRI Brain With & Without Contrast    Result Date:  4/7/2023  Impression: 2.2 cm enhancing and partially cystic mass of the left basal ganglia as above, with surrounding edema extending towards the insular and left temporal regions, favoring primary glial neoplasm over metastasis. Recommend neurosurgical consultation. Electronically Signed: Christiano Nino  4/7/2023 4:32 PM EDT  Workstation ID: [id number]    XR Chest 1 View    Result Date: 4/7/2023  No evidence of acute disease in the chest. Electronically Signed: Christiano Nino  4/7/2023 11:31 AM EDT  Workstation ID: [id number]      Results: Reviewed.  I reviewed the patient's new laboratory and imaging results.  I independently reviewed the patient's new images.    Medications: Reviewed.    Assessment   A/P     Hospital:  LOS: 4 days   ICU: 4h      Diagnosis   • **G93.89, Brain Bx 04/11/23 [G93.89]   • Seizures [R56.9]   • Liver transplanted [Z94.4]   • Immunosuppressed status [D84.9]     Cheo is a 47 y.o. male admitted on 4/7/2023  for evaluation after a witnessed seizure at home.     Per report, the patient's wife noted confusion and increased somnolence the week leading up to his generalized seizure when wife called EMS. By the time they arrived, his seizure resolved but remained confused. Upon ED admission, his CSF studies were negative for an infection. He was started on Keppra, and MRI brain obtained, demonstrating a 2.2-cm enhancing and partially cystic mass of the left basal ganglia with surrounding edema extending toward the insular and left temporal region, favoring primary glial neoplasm. Dr. Gibbons with neurosurgery was consulted, recommending biopsy.     Assessment/Management/Treatment Plan:    5. Brain: 2.2 cm enhancing and partially cystic mas of the left basal ganglia with surrounding edema.  S/P Brain Bx 04/11/23   Dr. Gibbons  Frozen: High-grade glial neoplasm    6. Neuro  a. New onset seizure  7. GI/Hepatology  a. S/P Liver Transplant secondary to Liver cancer, 2013 - Immunosuppressive therapy with  Tacrolimus.  8. Endocrine  a. Body mass index is 29.2 kg/m². Overweight: 25.0-29.9kg/m2   b. No history of T2 Diabetes    No results found for: HGBA1C     Results from last 7 days   Lab Units 04/07/23  1732   GLUCOSE mg/dL 83       Diet: Diet: Regular/House Diet; Texture: Regular Texture (IDDSI 7); Fluid Consistency: Thin (IDDSI 0)  Orders Placed This Encounter      DIET MESSAGE Please send cheese pizza,  salad with blue cheese, iced tea, and vanilla ice cream. Thank you :)      Advance Directives: Code Status and Medical Interventions:   Ordered at: 04/11/23 1215     Level Of Support Discussed With:    Patient     Code Status (Patient has no pulse and is not breathing):    CPR (Attempt to Resuscitate)     Medical Interventions (Patient has pulse or is breathing):    Full        DVT prophylaxis:  Mechanical DVT prophylaxis orders are present.     In brief:  6. Discussed with Dr. PEYTON Gibbons (Neurosurgery)  7. Discussed with  Dr. Duncan Pradhan (Infectious Disease)  8. Discussed with patient and family. They live in [place], [place].  1. They need to establish their Oncology Care, nearby where they live or at [facility]) where he got his liver transplant.  9. Obtain a CD-ROM with all his images for them to take to their physician at home.  10. Disposition: Admit to ICU   1. Probably discharge home tomorrow.    Plan of care and goals reviewed during interdisciplinary rounds.  I discussed the patient's findings and my recommendations with patient, family, nursing staff and consulting provider    MDM:    Problem(s) High due to: Acute or Chronic illness or injury that may poses a threat to life or bodily function  Data: High due to: Review of prior external records from each unique source, Review of the result(s) of each unique test and Discuss management or test interpretation with external physician or NPP (not separately reported)     High      [x] Primary Attending Intensive Care Medicine -  Nutrition Support   [] Consultant    Copied text in this note has been reviewed and is accurate as of 23        Electronically signed by Donis Briseno MD at 23 1730     Gonzales Gibbons MD at 23 0759          Chief complaint: Brain mass    Admit Diagnosis:   Seizure [R56.9]  Seizures [R56.9]     Subjective: No events overnight    Objective:    Vitals:    23 0725   BP: 130/83   Pulse: 72   Resp: 18   Temp: 97.7 °F (36.5 °C)   SpO2: 100%     Pulse  Av.4  Min: 63  Max: 114  Systolic (24hrs), Av , Min:115 , Max:131     Diastolic (24hrs), Av, Min:75, Max:91    Temp (24hrs), Av °F (36.7 °C), Min:97.3 °F (36.3 °C), Max:98.6 °F (37 °C)      Awake, alert, pleasant, conversant, and appropriate    Lab Results   Component Value Date     2023       A/P:   Admit Diagnosis:   Seizure [R56.9]  Seizures [R56.9]     Informed consent was once again obtained for a stereotactic brain biopsy.  All questions were answered.  No guarantees were given or implied.  The patient consents to proceed with surgery      Electronically signed by Gonzales Gibbons MD at 23 0800     Duncan Pradhan MD at 23 0611              INFECTIOUS DISEASE PROGRESS NOTE    hCeo Garrett  1975  [id number]    Date of Consult: 2023    Admission Date: 2023      Requesting Provider: Roxanne Iverson DO  Evaluating Physician: Duncan Pradhan MD    Reason for Consultation: immunocompromised, brain lesion    History of present illness:    23: Patient is a 47 y.o. male with h/o liver cancer and liver transplantation 2013 at [facility]/on Tacrolimus who we were asked to see for possible infectious brain lesion.  The patient was brought to [facility] ED on  by EMS after his wife witness the patient having a seizure at her family's home. They are from [place], but are visiting family near [place], [place].   The patient has no h/o seizures and has been relatively healthy  since his liver transplantation.   He states he does no remember leading up to his being taken to [facility].  On arrival, the patient is afebrile.  Admitting labs are WBC 18,200 with 86% neutrophils, lactic acid 6.4, PCT 0.03, Creatinine 1.08, ammonia 49, and UA WBC 0-2.  A UDS was negative.  A respiratory panel PCR was negative.  Blood cultures are negative to date. A CXR showed no acute findings.  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  He underwent an LP yesterday with WBC CSF zero, glucose 88, and protein 30.7. A Meningitis/encephalitis panel PCR was negative.  A toxoplasma PCR from CSF is pending.  A CSF culture is negative to date. He is currently on no antibiotics. ID was asked to evaluate.  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     4/9/23:He denies headache, earache, sore throat.  He has remained afebrile.  He has had no further seizures.His white blood cell count today is 9.7.  His creatinine is 0.8.  Serum cryptococcal antigen was negative.  He denies nausea, vomiting, diarrhea.    4/10/23: He has remained afebrile. CSF and serum cryptococcal antigens were negative. Toxoplasma serology is pending.  QuantiFERON Gold TB test is pending.  Urinary histoplasma Blastomyces antigens are pending. Brain biopsy is scheduled for tomorrow.  He complains of a mild headache.  Denies nausea and vomiting.    4/11/23: He has remained afebrile.  Toxoplasma serology is negative. Toxoplasma CSF PCR is pending. QuantiFERON Gold TB test is pending. 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        Past Medical History:   Diagnosis Date   • Liver cancer        History reviewed. No pertinent surgical history.   Liver transplantation 2013    History reviewed. No pertinent family history.    Social History     Socioeconomic  History   • Marital status:    Denies tobacco, alcohol, or illicit drug use.   Lives in    No Known Allergies      Medication:    Current Facility-Administered Medications:   •  acetaminophen (TYLENOL) tablet 650 mg, 650 mg, Oral, Q4H PRN, Roxanne Iverson, DO, 650 mg at 04/10/23 2043  •  Calcium Replacement - Follow Nurse / BPA Driven Protocol, , Does not apply, PRN, Roxanne Iverson R, DO  •  levETIRAcetam (KEPPRA) tablet 1,000 mg, 1,000 mg, Oral, Q12H, Alyssa Galloway MD, 1,000 mg at 04/10/23 2043  •  LORazepam (ATIVAN) injection 2 mg, 2 mg, Intravenous, Q5 Min PRN, Alyssa Galloway MD  •  Magnesium Standard Dose Replacement - Follow Nurse / BPA Driven Protocol, , Does not apply, PRN, Roxanne Iverson R, DO  •  ondansetron (ZOFRAN) injection 4 mg, 4 mg, Intravenous, Q6H PRN, Roxanne Iverson, DO  •  Phosphorus Replacement - Follow Nurse / BPA Driven Protocol, , Does not apply, PRN, Roxanne Iverson R, DO  •  Potassium Replacement - Follow Nurse / BPA Driven Protocol, , Does not apply, PRN, Roxanne Iverson R, DO  •  sodium chloride 0.9 % flush 10 mL, 10 mL, Intravenous, PRN, Roxanne Iverson, DO, 10 mL at 23  •  sodium chloride 0.9 % flush 10 mL, 10 mL, Intravenous, Q12H, Roxanne Iverson, DO, 10 mL at 04/10/23 2044  •  sodium chloride 0.9 % flush 10 mL, 10 mL, Intravenous, PRN, Roxanne Iverson R, DO  •  sodium chloride 0.9 % infusion 40 mL, 40 mL, Intravenous, PRN, Roxanne Iverson R, DO  •  tacrolimus (PROGRAF) capsule 1.5 mg, 1.5 mg, Oral, Q12H, Roxanne Iverson, DO, 1.5 mg at 04/10/23 2146    Antibiotics:  Anti-Infectives (From admission, onward)    None            Review of Systems:  See above      Physical Exam:   Vital Signs  Temp (24hrs), Av °F (36.7 °C), Min:97.3 °F (36.3 °C), Max:98.6 °F (37 °C)    Temp  Min: 97.3 °F (36.3 °C)  Max: 98.6 °F (37 °C)  BP  Min: 115/77  Max: 131/75  Pulse  Min: 63  Max: 114  Resp  Min: 16  Max: 20  SpO2  Min: 95 %  Max: 99 %    GENERAL: Awake  and alert, in no acute distress.   HEENT: Normocephalic, atraumatic.  PERRL. EOMI. No conjunctival injection. No icterus. Oropharynx clear without evidence of thrush or exudate.  NECK: Supple  LYMPH: No cervical, axillary or inguinal lymphadenopathy.  HEART: RRR; No murmur, rubs, gallops.   LUNGS: Clear to auscultation bilaterally without wheezing, rales, rhonchi. Normal respiratory effort. Nonlabored.  ABDOMEN: Soft, nontender, nondistended.. No rebound or guarding. NO mass or HSM  :  Without Bailey catheter.  MSK: No joint effusions or erythema  SKIN: Warm and dry without cutaneous eruptions on Inspection/palpation.    NEURO: Oriented to PPT.  Motor 5/5 strength  PSYCHIATRIC: Normal insight and judgment. Cooperative with PE    Laboratory Data    Results from last 7 days   Lab Units 04/09/23  0640 04/08/23  0538 04/07/23  1053   WBC 10*3/mm3 9.66 9.94 18.22*   HEMOGLOBIN g/dL 15.3 17.2 16.6   HEMATOCRIT % 43.9 47.4 46.4   PLATELETS 10*3/mm3 145 168 161     Results from last 7 days   Lab Units 04/09/23  0640   SODIUM mmol/L 144   POTASSIUM mmol/L 3.7   CHLORIDE mmol/L 107   CO2 mmol/L 26.0   BUN mg/dL 16   CREATININE mg/dL 0.80   GLUCOSE mg/dL 97   CALCIUM mg/dL 9.3     Results from last 7 days   Lab Units 04/07/23  1053   ALK PHOS U/L 48   BILIRUBIN mg/dL 0.5   ALT (SGPT) U/L 27   AST (SGOT) U/L 24             Results from last 7 days   Lab Units 04/07/23  1826   LACTATE mmol/L 1.7             Estimated Creatinine Clearance: 157.8 mL/min (by C-G formula based on SCr of 0.8 mg/dL).      Microbiology:  Microbiology Results (last 10 days)     Procedure Component Value - Date/Time    Meningitis / Encephalitis Panel, PCR - Cerebrospinal Fluid, Lumbar Puncture [[id number]]  (Normal) Collected: 04/07/23 1418    Lab Status: Final result Specimen: Cerebrospinal Fluid from Lumbar Puncture Updated: 04/07/23 155     ESCHERICHIA COLI K1, PCR Not Detected     HAEMOPHILUS INFLUENZAE, PCR Not Detected     LISTERIA MONOCYTOGENES,  PCR Not Detected     NEISSERIA MENINGITIDIS, PCR Not Detected     STREPTOCOCCUS AGALACTIAE, PCR Not Detected     STREPTOCOCCUS PNEUMONIAE, PCR Not Detected     CYTOMEGALOVIRUS (CMV), PCR Not Detected     ENTEROVIRUS, PCR Not Detected     HERPES SIMPLEX VIRUS 1 (HSV-1), PCR Not Detected     HERPES SIMPLEX VIRUS 2 (HSV-2), PCR Not Detected     HUMAN PARECHOVIRUS, PCR Not Detected     VARICELLA ZOSTER VIRUS (VZV), PCR Not Detected     CRYPTOCOCCUS NEOFORMANS / GATTII, PCR Not Detected     HUMAN HERPES VIRUS 6 PCR Not Detected    Culture, CSF - Cerebrospinal Fluid, Lumbar Puncture [[id number]] Collected: 04/07/23 1304    Lab Status: Final result Specimen: Cerebrospinal Fluid from Lumbar Puncture Updated: 04/10/23 0652     CSF Culture No growth at 3 days     Gram Stain No WBCs or organisms seen    Cryptococcal AG, CSF - Cerebrospinal Fluid, Lumbar Puncture [[id number]]  (Normal) Collected: 04/07/23 1304    Lab Status: Final result Specimen: Cerebrospinal Fluid from Lumbar Puncture Updated: 04/09/23 0734     Cryptococcal Antigen, CSF Negative    Blood Culture - Blood, Hand, Right [[id number]]  (Normal) Collected: 04/07/23 1243    Lab Status: Preliminary result Specimen: Blood from Hand, Right Updated: 04/10/23 1300     Blood Culture No growth at 3 days    Blood Culture - Blood, Hand, Left [[id number]]  (Normal) Collected: 04/07/23 1243    Lab Status: Preliminary result Specimen: Blood from Hand, Left Updated: 04/10/23 1300     Blood Culture No growth at 3 days    Respiratory Panel PCR w/COVID-19(SARS-CoV-2) JESSY/TAMMY/ULISES/PAD/COR/MAD/WALESKA In-House, NP Swab in UTM/VTM, 3-4 HR TAT - Swab, Nasopharynx [[id number]]  (Normal) Collected: 04/07/23 1227    Lab Status: Final result Specimen: Swab from Nasopharynx Updated: 04/07/23 1349     ADENOVIRUS, PCR Not Detected     Coronavirus 229E Not Detected     Coronavirus HKU1 Not Detected     Coronavirus NL63 Not Detected     Coronavirus OC43 Not Detected     COVID19 Not Detected     Human  Metapneumovirus Not Detected     Human Rhinovirus/Enterovirus Not Detected     Influenza A PCR Not Detected     Influenza B PCR Not Detected     Parainfluenza Virus 1 Not Detected     Parainfluenza Virus 2 Not Detected     Parainfluenza Virus 3 Not Detected     Parainfluenza Virus 4 Not Detected     RSV, PCR Not Detected     Bordetella pertussis pcr Not Detected     Bordetella parapertussis PCR Not Detected     Chlamydophila pneumoniae PCR Not Detected     Mycoplasma pneumo by PCR Not Detected    Narrative:      In the setting of a positive respiratory panel with a viral infection PLUS a negative procalcitonin without other underlying concern for bacterial infection, consider observing off antibiotics or discontinuation of antibiotics and continue supportive care. If the respiratory panel is positive for atypical bacterial infection (Bordetella pertussis, Chlamydophila pneumoniae, or Mycoplasma pneumoniae), consider antibiotic de-escalation to target atypical bacterial infection.                Radiology:  Imaging Results (Last 72 Hours)     Procedure Component Value Units Date/Time    CT Chest With & Without Contrast Diagnostic [[id number]] Collected: 04/08/23 2031     Updated: 04/08/23 2055    Narrative:      CT CHEST W WO CONTRAST DIAGNOSTIC, CT ABDOMEN PELVIS W WO CONTRAST    Date of Exam: 4/8/2023 7:08 PM EDT    Indication: Brain mass, evaluate for malignancy, metastatic workup    Comparison: CT head without contrast, MRI brain 4/7/2023    Technique: Axial CT images were obtained of the chest , abdomen, and pelvis before and after the uneventful intravenous administration of 100 mL Isovue 300.  Reconstructed coronal and sagittal images were also obtained. Automated exposure control and   iterative construction methods were used.    Findings:  Chest:  Thoracic aortic arch branch vasculature is patent. Heart size is normal. Negative for pericardial effusion. No pulmonary embolus. Negative for mediastinal, hilar,  or axillary adenopathy. Bilateral gynecomastia noted.    Trachea and mainstem bronchi are patent. Lungs without consolidation. No pneumothorax. Mild linear atelectasis at the left lower lobe. No suspicious pulmonary nodule or mass. Thoracic vertebral bodies are maintained in height. Negative for aggressive   osseous lesion or fracture. Mild wall thickening of distal esophagus which may relate to reflux or esophagitis.    Abdomen and pelvis:  Initial noncontrast imaging demonstrates no radiodense renal calculus. No ureteral calculus. Subsequently post contrast images were obtained. Post surgical changes of prior hepatic transplant. No liver lesion. The spleen is normal in size. Adrenal glands   are normal. Pancreas without evidence of pancreatitis. Is no pancreatic mass. Gallbladder absent. No biliary dilatation.     Kidneys are symmetrically enhancing. There is no enhancing or suspicious renal mass. No hydronephrosis or hydroureter. Urinary bladder is thin-walled. Prostate normal in size. Delayed images demonstrate normal contrast excretion into the nondilated   ureters.    Negative for pneumoperitoneum. No bowel obstruction. No fluid collection. The appendix is normal. Fluid-filled distention of the stomach. The portal vein and hepatic veins are patent. The splenic vein and superior mesenteric vein are patent. There are   prominent venous collaterals in the left upper quadrant extending into the left mid and lower abdomen adjacent to the infrarenal aorta which may relate to sequela of prior portal hypertension given history of liver transplant. Negative for central   mesenteric or retroperitoneal adenopathy. No aggressive osseous lesion or acute fracture. Disc narrowing lower lumbar spine at L5-S1.      Impression:      Impression:  1. No findings of primary malignancy or metastatic adenopathy in the chest, abdomen, or pelvis.  2. Post surgical changes of liver transplant.  3. Wall thickening at mid and distal  esophagus, correlate for findings of reflux/esophagitis.  4. Additional incidental chronic findings above.    Electronically Signed: Juan Chandler    4/8/2023 8:52 PM EDT    Workstation ID: [id number]    CT Abdomen Pelvis With & Without Contrast [[id number]] Collected: 04/08/23 2031     Updated: 04/08/23 2055    Narrative:      CT CHEST W WO CONTRAST DIAGNOSTIC, CT ABDOMEN PELVIS W WO CONTRAST    Date of Exam: 4/8/2023 7:08 PM EDT    Indication: Brain mass, evaluate for malignancy, metastatic workup    Comparison: CT head without contrast, MRI brain 4/7/2023    Technique: Axial CT images were obtained of the chest , abdomen, and pelvis before and after the uneventful intravenous administration of 100 mL Isovue 300.  Reconstructed coronal and sagittal images were also obtained. Automated exposure control and   iterative construction methods were used.    Findings:  Chest:  Thoracic aortic arch branch vasculature is patent. Heart size is normal. Negative for pericardial effusion. No pulmonary embolus. Negative for mediastinal, hilar, or axillary adenopathy. Bilateral gynecomastia noted.    Trachea and mainstem bronchi are patent. Lungs without consolidation. No pneumothorax. Mild linear atelectasis at the left lower lobe. No suspicious pulmonary nodule or mass. Thoracic vertebral bodies are maintained in height. Negative for aggressive   osseous lesion or fracture. Mild wall thickening of distal esophagus which may relate to reflux or esophagitis.    Abdomen and pelvis:  Initial noncontrast imaging demonstrates no radiodense renal calculus. No ureteral calculus. Subsequently post contrast images were obtained. Post surgical changes of prior hepatic transplant. No liver lesion. The spleen is normal in size. Adrenal glands   are normal. Pancreas without evidence of pancreatitis. Is no pancreatic mass. Gallbladder absent. No biliary dilatation.     Kidneys are symmetrically enhancing. There is no enhancing or suspicious renal  mass. No hydronephrosis or hydroureter. Urinary bladder is thin-walled. Prostate normal in size. Delayed images demonstrate normal contrast excretion into the nondilated   ureters.    Negative for pneumoperitoneum. No bowel obstruction. No fluid collection. The appendix is normal. Fluid-filled distention of the stomach. The portal vein and hepatic veins are patent. The splenic vein and superior mesenteric vein are patent. There are   prominent venous collaterals in the left upper quadrant extending into the left mid and lower abdomen adjacent to the infrarenal aorta which may relate to sequela of prior portal hypertension given history of liver transplant. Negative for central   mesenteric or retroperitoneal adenopathy. No aggressive osseous lesion or acute fracture. Disc narrowing lower lumbar spine at L5-S1.      Impression:      Impression:  1. No findings of primary malignancy or metastatic adenopathy in the chest, abdomen, or pelvis.  2. Post surgical changes of liver transplant.  3. Wall thickening at mid and distal esophagus, correlate for findings of reflux/esophagitis.  4. Additional incidental chronic findings above.    Electronically Signed: Juan Chandler    4/8/2023 8:52 PM EDT    Workstation ID: [id number]      I read his radiographic images.  I reviewed the images with the patient and his wife yesterday.      Impression:   7. 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  8. New onset of seizure.  On Keppra  9. Leukocytosis/neutrophilia, stress vs infection  10. Lactic acidosis, stress vs other  11. H/o Liver cancer s/p liver transplant 2013 on Tacrolimus.  12. Immunocompromised host.    PLAN/RECOMMENDATIONS:   1.  Brain biopsy pathology-pending  2.  Oncology consultation-he may wish to have this performed closer to home    I discussed his complex situation with Dr. Gibbons and Dr. Briseno  I discussed his complex situation with his wife and the patient himself today.    Duncan RUIZ  "MD Lorne  2023  06:11 EDT                    Electronically signed by Duncan Pradhan MD at 23 [phone]     Alyssa Galloway MD at 04/10/23 1634     Summary:Signing off note               DOS: 4/10/2023  NAME: Cheo Garrett   : 1975  PCP: Provider, No Known  Chief Complaint   Patient presents with   • Altered Mental Status       Chief complaint: No new clinical seizures noted.  Subjective: Patient is awaiting stereotactic biopsy of his deep lesion in the basal ganglia tomorrow by Dr. Gibbons.    Objective:  Vital signs: /91 (BP Location: Right arm, Patient Position: Sitting)   Pulse 77   Temp 98.2 °F (36.8 °C) (Oral)   Resp 16   Ht 193 cm (75.98\")   Wt 109 kg (239 lb 12.8 oz)   SpO2 95%   BMI 29.20 kg/m²    Gen: NAD, vitals reviewed  MS: Back to baseline.  CN: Cranials 2-12: No focal deficits.  Motor: Muscles of both upper and lower extremities show good bulk power and tone.  Sensory: No sensory loss noted.  DTRs: 2+ bilaterally with downgoing toes.  Coordination: Normal finger-to-nose coordination.  Gait: He is able to get up and ambulate independently.    ROS:  General: Pleasant gentleman currently in no distress.  Neurological: No focal neurological deficits and no seizures so far.    Laboratory results:  Lab Results   Component Value Date    GLUCOSE 97 2023    CALCIUM 9.3 2023     2023    K 3.7 2023    CO2 26.0 2023     2023    BUN 16 2023    CREATININE 0.80 2023    BCR 20.0 2023    ANIONGAP 11.0 2023     Lab Results   Component Value Date    WBC 9.66 2023    HGB 15.3 2023    HCT 43.9 2023    MCV 86.6 2023     2023     No results found for: LDL         Review of labs: The lab work looks unremarkable.    Review and interpretation of imaging: The MRI of the brain with and without contrast was reviewed in details that shows the following:    Findings:   Corresponding to " the area of apparent low density within the left basal ganglia seen on same-day CT, there is a somewhat irregular, partially cystic and avidly enhancing mass measuring 2.2 x 2.0 cm. There is either a separate nodule or small satellite   nodule present posteriorly measuring 8 mm, also enhancing. Moderate surrounding edema is present on FLAIR sequences, extending to involve the left medial temporal lobe. No additional mass or abnormal enhancement is present. The ventricles are normal in   size and configuration. No acute infarct is present on diffusion weighted sequences. The orbits are normal. The paranasal sinuses are grossly clear.     IMPRESSION:  Impression:   2.2 cm enhancing and partially cystic mass of the left basal ganglia as above, with surrounding edema extending towards the insular and left temporal regions, favoring primary glial neoplasm over metastasis    Workup to date: The CSF studies initially performed shows the following:     Latest Reference Range & Units Most Recent   Supernatant Color, CSF Colorless  Colorless  4/7/23 13:04   Appearance, CSF Clear  Clear  4/7/23 13:04   Color, CSF Colorless  Colorless  4/7/23 13:04   Tube Number, CSF  1  4/7/23 13:04   Volume, CSF mL 8.0  4/7/23 13:04   WBC, CSF 0 - 5 /mm3 0  4/7/23 13:04   RBC, CSF 0 - 5 /mm3 18 (H)  4/7/23 13:04   Glucose, CSF 40 - 70 mg/dL 88 (H)  4/7/23 13:04   Protein, Total (CSF) 15.0 - 45.0 mg/dL 30.7  4/7/23 13:04   (H): Data is abnormally high    The CSF study for the meningitis encephalitis panel shows the following:      ESCHERICHIA COLI K1, PCR  Not Detected Not Detected    HAEMOPHILUS INFLUENZAE, PCR  Not Detected Not Detected    LISTERIA MONOCYTOGENES, PCR  Not Detected Not Detected    NEISSERIA MENINGITIDIS, PCR  Not Detected Not Detected    STREPTOCOCCUS AGALACTIAE, PCR  Not Detected Not Detected    STREPTOCOCCUS PNEUMONIAE, PCR  Not Detected Not Detected    CYTOMEGALOVIRUS (CMV), PCR  Not Detected Not Detected    ENTEROVIRUS,  PCR  Not Detected Not Detected    HERPES SIMPLEX VIRUS 1 (HSV-1), PCR  Not Detected Not Detected    HERPES SIMPLEX VIRUS 2 (HSV-2), PCR  Not Detected Not Detected    HUMAN PARECHOVIRUS, PCR  Not Detected Not Detected    VARICELLA ZOSTER VIRUS (VZV), PCR  Not Detected Not Detected    CRYPTOCOCCUS NEOFORMANS / GATTII, PCR  Not Detected Not Detected    HUMAN HERPES VIRUS 6 PCR  Not Detected Not Detected    Resulting Agency [facility]       Diagnoses: Patient with new onset seizure as well as possibility of glioblastoma multiforme versus oligodendroglioma or glial 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    Plan:  1.  It will be then decided whether he needs oncologist or radiation therapist.  2.  He will continue the Keppra as before.  If he is unable to swallow postsurgery, then it is best to continue the Keppra at the intravenous level milligram 2 mg switch.  3.  He has been instructed not to drive or use any hazardous equipment or engage in any hazardous activities until seizure-free for the next 6 months as per the [facility] driving law.  4.  It would be advisable to give him a dose of Keppra 1000 mg intravenous prior to initiating the biopsy process.    Discussed with the patient and his wife and at this point we will sign off.  If for any reason there is any recurrence of seizures or any other questions arise please reconsult us.  Patient will most probably follow-up with a neurologist and is local hometown of [facility].    Spent a total of 30 minutes in face-to-face evaluation and management of the patient.    Electronically signed by Alyssa Galloway MD, 04/10/23, 4:34 PM EDT.      Electronically signed by Alyssa Galloway MD at 04/10/23 1643     Emiliano Hightower PA-C at 04/10/23 1051     Attestation signed by Gonzales Gibbons MD at 04/10/23 1249    I have reviewed this documentation and agree.    Informed  "consent was once again obtained from the patient and his wife for a stereotactic brain biopsy.  He is on the schedule for tomorrow afternoon.                  [facility]    Cheo Garrett  1975  [id number]      Seizures    Liver transplanted    Immunosuppressed status      Admit Diagnosis: Seizure [R56.9]  Seizures [R56.9]  Date of Admission: 2023 10:43 AM     Interval History: Patient has no new complaints at this time.    Physical Exam:  Blood pressure 126/83, pulse 70, temperature 97.9 °F (36.6 °C), temperature source Oral, resp. rate 16, height 193 cm (75.98\"), weight 109 kg (239 lb 12.8 oz), SpO2 97 %.  No intake/output data recorded.  Physical Exam   Patient is sitting upright in the bed, he is pleasant, conversational, responds all commands.  Moves all extremities spontaneously.  Cranial nerves grossly intact.    Data Review:   (All imaging reviewed independently unless state otherwise)  No new data to review at this time.    Diagnosis:  (R56.9) New onset seizure    (R41.0) Confusion    (D72.829) Leukocytosis, unspecified type    (E87.6) Hypokalemia    (Z74.09) Impaired functional mobility, balance, gait, and endurance    (G93.89) Brain mass - Plan: Case Request, Case Request  Seizure [R56.9]  Seizures [R56.9]      Medical Decision Making:   Plan is for patient to undergo biopsy tomorrow with Dr. Raymond Gibbons, he will be made n.p.o. tomorrow beginning at [id number].  Please continue Keppra in the interim.  Final recommendations will be made by Dr. Gibbons's attestation.    Emiliano Hightower PA-C  4/10/2023      Electronically signed by Gonzales Gibbons MD at 04/10/23 1249     Roxanne Iverson DO at 04/10/23 0953              [facility] Medicine Services  PROGRESS NOTE    Patient Name: Cheo Garrett  : 1975  MRN: [id number]    Date of Admission: 2023  Primary Care Physician: Provider, No Known    Subjective "   Subjective     CC:  F/u seizures    HPI:  Patient states he slept very well last night. No further visual hallucinations, complaints of headache.    ROS:  Gen- No fevers, chills  CV- No chest pain, palpitations  Resp- No cough, dyspnea  GI- No N/V/D, abd pain    Objective   Objective     Vital Signs:   Temp:  [97.6 °F (36.4 °C)-98.4 °F (36.9 °C)] 97.9 °F (36.6 °C)  Heart Rate:  [] 70  Resp:  [16-18] 16  BP: (124-143)/(82-94) 126/83     Physical Exam:  Constitutional: No acute distress, awake, alert, sitting up in bed  HENT: NCAT, mucous membranes moist  Respiratory:  respiratory effort normal   Cardiovascular: RRR, no murmurs, rubs, or gallops  Musculoskeletal: No bilateral ankle edema  Psychiatric: Appropriate affect, cooperative  Neurologic: Oriented x3, speech clear, no focal deficits currently  Skin: No rashes      Results Reviewed:  LAB RESULTS:      Lab 04/09/23  0640 04/08/23  0538 04/07/23  1826 04/07/23  1243 04/07/23  1053   WBC 9.66 9.94  --   --  18.22*   HEMOGLOBIN 15.3 17.2  --   --  16.6   HEMATOCRIT 43.9 47.4  --   --  46.4   PLATELETS 145 168  --   --  161   NEUTROS ABS 5.92 8.20*  --   --  15.73*   IMMATURE GRANS (ABS) 0.09* 0.04  --   --  0.10*   LYMPHS ABS 2.39 1.30  --   --  0.55*   MONOS ABS 1.15* 0.38  --   --  1.79*   EOS ABS 0.05 0.00  --   --  0.01   MCV 86.6 85.3  --   --  86.7   PROCALCITONIN  --   --   --   --  0.03   LACTATE  --   --  1.7 4.0* 6.4*   PROTIME  --   --   --   --  14.0         Lab 04/09/23  0640 04/08/23  0538 04/07/23  1053   SODIUM 144 140 140   POTASSIUM 3.7 4.0 3.1*   CHLORIDE 107 105 104   CO2 26.0 23.0 19.0*   ANION GAP 11.0 12.0 17.0*   BUN 16 13 12   CREATININE 0.80 0.82 1.08   EGFR 111.2 110.4 86.2   GLUCOSE 97 153* 151*   CALCIUM 9.3 9.4 8.8   MAGNESIUM  --   --  1.9   TSH  --   --  1.350         Lab 04/07/23  1053   TOTAL PROTEIN 6.5   ALBUMIN 4.2   GLOBULIN 2.3   ALT (SGPT) 27   AST (SGOT) 24   BILIRUBIN 0.5   ALK PHOS 48         Lab 04/07/23  1053    HSTROP T 9   PROTIME 14.0   INR 1.08                 Brief Urine Lab Results  (Last result in the past 365 days)      Color   Clarity   Blood   Leuk Est   Nitrite   Protein   CREAT   Urine HCG        04/07/23 1227 Yellow   Cloudy   Moderate (2+)   Negative   Negative   30 mg/dL (1+)                 Microbiology Results Abnormal     Procedure Component Value - Date/Time    Culture, CSF - Cerebrospinal Fluid, Lumbar Puncture [[id number]] Collected: 04/07/23 1304    Lab Status: Final result Specimen: Cerebrospinal Fluid from Lumbar Puncture Updated: 04/10/23 0652     CSF Culture No growth at 3 days     Gram Stain No WBCs or organisms seen    Blood Culture - Blood, Hand, Right [[id number]]  (Normal) Collected: 04/07/23 1243    Lab Status: Preliminary result Specimen: Blood from Hand, Right Updated: 04/09/23 1301     Blood Culture No growth at 2 days    Blood Culture - Blood, Hand, Left [[id number]]  (Normal) Collected: 04/07/23 1243    Lab Status: Preliminary result Specimen: Blood from Hand, Left Updated: 04/09/23 1301     Blood Culture No growth at 2 days    Cryptococcal AG, CSF - Cerebrospinal Fluid, Lumbar Puncture [[id number]]  (Normal) Collected: 04/07/23 1304    Lab Status: Final result Specimen: Cerebrospinal Fluid from Lumbar Puncture Updated: 04/09/23 0734     Cryptococcal Antigen, CSF Negative    Meningitis / Encephalitis Panel, PCR - Cerebrospinal Fluid, Lumbar Puncture [[id number]]  (Normal) Collected: 04/07/23 1418    Lab Status: Final result Specimen: Cerebrospinal Fluid from Lumbar Puncture Updated: 04/07/23 1553     ESCHERICHIA COLI K1, PCR Not Detected     HAEMOPHILUS INFLUENZAE, PCR Not Detected     LISTERIA MONOCYTOGENES, PCR Not Detected     NEISSERIA MENINGITIDIS, PCR Not Detected     STREPTOCOCCUS AGALACTIAE, PCR Not Detected     STREPTOCOCCUS PNEUMONIAE, PCR Not Detected     CYTOMEGALOVIRUS (CMV), PCR Not Detected     ENTEROVIRUS, PCR Not Detected     HERPES SIMPLEX VIRUS 1 (HSV-1), PCR Not Detected      HERPES SIMPLEX VIRUS 2 (HSV-2), PCR Not Detected     HUMAN PARECHOVIRUS, PCR Not Detected     VARICELLA ZOSTER VIRUS (VZV), PCR Not Detected     CRYPTOCOCCUS NEOFORMANS / GATTII, PCR Not Detected     HUMAN HERPES VIRUS 6 PCR Not Detected    Respiratory Panel PCR w/COVID-19(SARS-CoV-2) JESSY/TAMMY/ULISES/PAD/COR/MAD/WALESKA In-House, NP Swab in UTM/VTM, 3-4 HR TAT - Swab, Nasopharynx [[id number]]  (Normal) Collected: 04/07/23 1227    Lab Status: Final result Specimen: Swab from Nasopharynx Updated: 04/07/23 1349     ADENOVIRUS, PCR Not Detected     Coronavirus 229E Not Detected     Coronavirus HKU1 Not Detected     Coronavirus NL63 Not Detected     Coronavirus OC43 Not Detected     COVID19 Not Detected     Human Metapneumovirus Not Detected     Human Rhinovirus/Enterovirus Not Detected     Influenza A PCR Not Detected     Influenza B PCR Not Detected     Parainfluenza Virus 1 Not Detected     Parainfluenza Virus 2 Not Detected     Parainfluenza Virus 3 Not Detected     Parainfluenza Virus 4 Not Detected     RSV, PCR Not Detected     Bordetella pertussis pcr Not Detected     Bordetella parapertussis PCR Not Detected     Chlamydophila pneumoniae PCR Not Detected     Mycoplasma pneumo by PCR Not Detected    Narrative:      In the setting of a positive respiratory panel with a viral infection PLUS a negative procalcitonin without other underlying concern for bacterial infection, consider observing off antibiotics or discontinuation of antibiotics and continue supportive care. If the respiratory panel is positive for atypical bacterial infection (Bordetella pertussis, Chlamydophila pneumoniae, or Mycoplasma pneumoniae), consider antibiotic de-escalation to target atypical bacterial infection.          CT Abdomen Pelvis With & Without Contrast    Result Date: 4/8/2023  CT CHEST W WO CONTRAST DIAGNOSTIC, CT ABDOMEN PELVIS W WO CONTRAST Date of Exam: 4/8/2023 7:08 PM EDT Indication: Brain mass, evaluate for malignancy, metastatic  workup Comparison: CT head without contrast, MRI brain 4/7/2023 Technique: Axial CT images were obtained of the chest , abdomen, and pelvis before and after the uneventful intravenous administration of 100 mL Isovue 300.  Reconstructed coronal and sagittal images were also obtained. Automated exposure control and iterative construction methods were used. Findings: Chest: Thoracic aortic arch branch vasculature is patent. Heart size is normal. Negative for pericardial effusion. No pulmonary embolus. Negative for mediastinal, hilar, or axillary adenopathy. Bilateral gynecomastia noted. Trachea and mainstem bronchi are patent. Lungs without consolidation. No pneumothorax. Mild linear atelectasis at the left lower lobe. No suspicious pulmonary nodule or mass. Thoracic vertebral bodies are maintained in height. Negative for aggressive osseous lesion or fracture. Mild wall thickening of distal esophagus which may relate to reflux or esophagitis. Abdomen and pelvis: Initial noncontrast imaging demonstrates no radiodense renal calculus. No ureteral calculus. Subsequently post contrast images were obtained. Post surgical changes of prior hepatic transplant. No liver lesion. The spleen is normal in size. Adrenal glands  are normal. Pancreas without evidence of pancreatitis. Is no pancreatic mass. Gallbladder absent. No biliary dilatation. Kidneys are symmetrically enhancing. There is no enhancing or suspicious renal mass. No hydronephrosis or hydroureter. Urinary bladder is thin-walled. Prostate normal in size. Delayed images demonstrate normal contrast excretion into the nondilated ureters. Negative for pneumoperitoneum. No bowel obstruction. No fluid collection. The appendix is normal. Fluid-filled distention of the stomach. The portal vein and hepatic veins are patent. The splenic vein and superior mesenteric vein are patent. There are prominent venous collaterals in the left upper quadrant extending into the left mid and  lower abdomen adjacent to the infrarenal aorta which may relate to sequela of prior portal hypertension given history of liver transplant. Negative for central mesenteric or retroperitoneal adenopathy. No aggressive osseous lesion or acute fracture. Disc narrowing lower lumbar spine at L5-S1.     Impression: Impression: 1. No findings of primary malignancy or metastatic adenopathy in the chest, abdomen, or pelvis. 2. Post surgical changes of liver transplant. 3. Wall thickening at mid and distal esophagus, correlate for findings of reflux/esophagitis. 4. Additional incidental chronic findings above. Electronically Signed: Juan Chandler  4/8/2023 8:52 PM EDT  Workstation ID: [id number]    CT Chest With & Without Contrast Diagnostic    Result Date: 4/8/2023  CT CHEST W WO CONTRAST DIAGNOSTIC, CT ABDOMEN PELVIS W WO CONTRAST Date of Exam: 4/8/2023 7:08 PM EDT Indication: Brain mass, evaluate for malignancy, metastatic workup Comparison: CT head without contrast, MRI brain 4/7/2023 Technique: Axial CT images were obtained of the chest , abdomen, and pelvis before and after the uneventful intravenous administration of 100 mL Isovue 300.  Reconstructed coronal and sagittal images were also obtained. Automated exposure control and iterative construction methods were used. Findings: Chest: Thoracic aortic arch branch vasculature is patent. Heart size is normal. Negative for pericardial effusion. No pulmonary embolus. Negative for mediastinal, hilar, or axillary adenopathy. Bilateral gynecomastia noted. Trachea and mainstem bronchi are patent. Lungs without consolidation. No pneumothorax. Mild linear atelectasis at the left lower lobe. No suspicious pulmonary nodule or mass. Thoracic vertebral bodies are maintained in height. Negative for aggressive osseous lesion or fracture. Mild wall thickening of distal esophagus which may relate to reflux or esophagitis. Abdomen and pelvis: Initial noncontrast imaging demonstrates no  radiodense renal calculus. No ureteral calculus. Subsequently post contrast images were obtained. Post surgical changes of prior hepatic transplant. No liver lesion. The spleen is normal in size. Adrenal glands  are normal. Pancreas without evidence of pancreatitis. Is no pancreatic mass. Gallbladder absent. No biliary dilatation. Kidneys are symmetrically enhancing. There is no enhancing or suspicious renal mass. No hydronephrosis or hydroureter. Urinary bladder is thin-walled. Prostate normal in size. Delayed images demonstrate normal contrast excretion into the nondilated ureters. Negative for pneumoperitoneum. No bowel obstruction. No fluid collection. The appendix is normal. Fluid-filled distention of the stomach. The portal vein and hepatic veins are patent. The splenic vein and superior mesenteric vein are patent. There are prominent venous collaterals in the left upper quadrant extending into the left mid and lower abdomen adjacent to the infrarenal aorta which may relate to sequela of prior portal hypertension given history of liver transplant. Negative for central mesenteric or retroperitoneal adenopathy. No aggressive osseous lesion or acute fracture. Disc narrowing lower lumbar spine at L5-S1.     Impression: Impression: 1. No findings of primary malignancy or metastatic adenopathy in the chest, abdomen, or pelvis. 2. Post surgical changes of liver transplant. 3. Wall thickening at mid and distal esophagus, correlate for findings of reflux/esophagitis. 4. Additional incidental chronic findings above. Electronically Signed: Juan Chandler  4/8/2023 8:52 PM EDT  Workstation ID: [id number]          Current medications:  Scheduled Meds:levETIRAcetam, 1,000 mg, Oral, Q12H  sodium chloride, 10 mL, Intravenous, Q12H  tacrolimus, 1.5 mg, Oral, Q12H      Continuous Infusions:   PRN Meds:.•  acetaminophen  •  Calcium Replacement - Follow Nurse / BPA Driven Protocol  •  LORazepam  •  Magnesium Standard Dose Replacement  - Follow Nurse / BPA Driven Protocol  •  ondansetron  •  Phosphorus Replacement - Follow Nurse / BPA Driven Protocol  •  Potassium Replacement - Follow Nurse / BPA Driven Protocol  •  sodium chloride  •  sodium chloride  •  sodium chloride    Assessment & Plan   Assessment & Plan     Active Hospital Problems    Diagnosis  POA   • **Seizures [R56.9]  Yes   • Liver transplanted [Z94.4]  Not Applicable   • Immunosuppressed status [D84.9]  Yes      Resolved Hospital Problems   No resolved problems to display.        Brief Hospital Course to date:  Cheo Garrett is a 47 y.o. male with PMHx significant for liver cancer s/p Liver transplant at [facility] (2013) on chronic immunosuppressant therapy who presented today via EMS after wife found him having a seizure at home this morning.     Brain Mass  New Onset Seizures  Confusion  - MRI brain with 2.2 cm enhancing and partially cystic mass of the left basal ganglia with surrounding edema extending towards the insular and left temporal regions, favoring primary glial neoplasm   - Neurology following, continue keppra BID  - appreciate Neurosurgery input, recommend biopsy, tentatively planned for Tuesday  - CT chest/abd/pelvis with no findings concerning for primary malignancy  - ID consult per NSGY recommendations, infectious work-up ongoing  - seizure precautions, ativan PRN  - SLP evaluation, PT/OT following  - hold on steroids due to minimal edema     Lactic Acidosis:  - likely secondary to seizures, now resolved     Hx of Liver Transplant   Hx of Liver Cancer (data deficient)  Immunosuppressive Therapy  - stable and follows with [facility] yearly  - Tacrolimus level pending  - liver enzymes within normal limits     Leukocytosis - resolved  - UA negative, CXR negative, CSF studies negative  - possibly reactive in setting of no fever, normal procal  - blood cultures are negative  - ID following    Expected Discharge Location and Transportation: Home  Expected Discharge    Expected Discharge Date and Time     Expected Discharge Date Expected Discharge Time    Apr 12, 2023       3-4 days    DVT prophylaxis:  Mechanical DVT prophylaxis orders are present.     AM-PAC 6 Clicks Score (PT): 24 (04/09/23 0800)    CODE STATUS:   Code Status and Medical Interventions:   Ordered at: 04/07/23 1354     Level Of Support Discussed With:    Patient     Code Status (Patient has no pulse and is not breathing):    CPR (Attempt to Resuscitate)     Medical Interventions (Patient has pulse or is breathing):    Full Support       Roxanne Iverson DO  04/10/23        Electronically signed by Roxanne Iverson DO at 04/10/23 0954     Duncan Pradhan MD at 04/10/23 0636              INFECTIOUS DISEASE PROGRESS NOTE    Cheo Garrett  1975  [id number]    Date of Consult: 4/8/2023    Admission Date: 4/7/2023      Requesting Provider: Roxanne Iverson DO  Evaluating Physician: Duncan Pradhan MD    Reason for Consultation: immunocompromised, brain lesion    History of present illness:    4/8/23: Patient is a 47 y.o. male with h/o liver cancer and liver transplantation 2013 at [facility]/on [facility] who we were asked to see for possible infectious brain lesion.  The patient was brought to [facility] ED on 4/7 by EMS after his wife witness the patient having a seizure at her family's home. They are from [place], but are visiting family near [place], [place].   The patient has no h/o seizures and has been relatively healthy since his liver transplantation.   He states he does no remember leading up to his being taken to [facility].  On arrival, the patient is afebrile.  Admitting labs are WBC 18,200 with 86% neutrophils, lactic acid 6.4, PCT 0.03, Creatinine 1.08, ammonia 49, and UA WBC 0-2.  A UDS was negative.  A respiratory panel PCR was negative.  Blood cultures are negative to date. A CXR showed no acute findings.  A MRI of the brain showed a 2.2 cm enhancing and partially cystic mass of left basil ganglia  with surrounding edema favoring glial neoplasm over metastasis.  He underwent an LP yesterday with WBC CSF zero, glucose 88, and protein 30.7. A Meningitis/encephalitis panel PCR was negative.  A toxoplasma PCR from CSF is pending.  A CSF culture is negative to date. He is currently on no antibiotics. ID was asked to evaluate.  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     4/9/23:He denies headache, earache, sore throat.  He has remained afebrile.  He has had no further seizures.His white blood cell count today is 9.7.  His creatinine is 0.8.  Serum cryptococcal antigen was negative.  He denies nausea, vomiting, diarrhea.    4/10/23: He has remained afebrile. CSF and serum cryptococcal antigens were negative. Toxoplasma serology is pending.  QuantiFERON Gold TB test is pending.  Urinary histoplasma Blastomyces antigens are pending. Brain biopsy is scheduled for tomorrow.  He complains of a mild headache.  Denies nausea and vomiting.    Past Medical History:   Diagnosis Date   • Liver cancer        History reviewed. No pertinent surgical history.   Liver transplantation 2013    History reviewed. No pertinent family history.    Social History     Socioeconomic History   • Marital status:    Denies tobacco, alcohol, or illicit drug use.   Lives in    No Known Allergies      Medication:    Current Facility-Administered Medications:   •  acetaminophen (TYLENOL) tablet 650 mg, 650 mg, Oral, Q4H PRN, Roxanne Iverson, DO  •  Calcium Replacement - Follow Nurse / BPA Driven Protocol, , Does not apply, PRN, Roxanne Iverson DO  •  levETIRAcetam (KEPPRA) tablet 1,000 mg, 1,000 mg, Oral, Q12H, Alyssa Galloway MD, 1,000 mg at 04/09/23 2104  •  LORazepam (ATIVAN) injection 1 mg, 1 mg, Intravenous, Q2H PRN, Roxanne Iverson DO  •  LORazepam (ATIVAN) injection 2 mg, 2 mg, Intravenous, Q5 Min PRN, Alyssa Galloway MD  •  Magnesium Standard Dose Replacement - Follow Nurse / BPA Driven Protocol, ,  Does not apply, PRN, Roxanne Iverson, DO  •  ondansetron (ZOFRAN) injection 4 mg, 4 mg, Intravenous, Q6H PRN, Roxanne Iverson, DO  •  Phosphorus Replacement - Follow Nurse / BPA Driven Protocol, , Does not apply, PRN, Roxanne Iverson, DO  •  Potassium Replacement - Follow Nurse / BPA Driven Protocol, , Does not apply, PRN, Roxanne Iverson R, DO  •  sodium chloride 0.9 % flush 10 mL, 10 mL, Intravenous, PRN, Roxanne Iverson, DO, 10 mL at 23 1913  •  sodium chloride 0.9 % flush 10 mL, 10 mL, Intravenous, Q12H, Roxanne Iverson, , 10 mL at 23 0850  •  sodium chloride 0.9 % flush 10 mL, 10 mL, Intravenous, PRN, Roxanne Iverson, DO  •  sodium chloride 0.9 % infusion 40 mL, 40 mL, Intravenous, PRN, Roxanne Iverson, DO  •  tacrolimus (PROGRAF) capsule 1.5 mg, 1.5 mg, Oral, Q12H, Roxanne Iverson, DO, 1.5 mg at 23 210    Antibiotics:  Anti-Infectives (From admission, onward)    None            Review of Systems:  See above      Physical Exam:   Vital Signs  Temp (24hrs), Av °F (36.7 °C), Min:97.6 °F (36.4 °C), Max:98.4 °F (36.9 °C)    Temp  Min: 97.6 °F (36.4 °C)  Max: 98.4 °F (36.9 °C)  BP  Min: 124/82  Max: 143/85  Pulse  Min: 65  Max: 102  Resp  Min: 16  Max: 18  SpO2  Min: 91 %  Max: 100 %    GENERAL: Awake and alert, in no acute distress.   HEENT: Normocephalic, atraumatic.  PERRL. EOMI. No conjunctival injection. No icterus. Oropharynx clear without evidence of thrush or exudate.  NECK: Supple  LYMPH: No cervical, axillary or inguinal lymphadenopathy.  HEART: RRR; No murmur, rubs, gallops.   LUNGS: Clear to auscultation bilaterally without wheezing, rales, rhonchi. Normal respiratory effort. Nonlabored.  ABDOMEN: Soft, nontender, nondistended.. No rebound or guarding. NO mass or HSM  :  Without Bailey catheter.  MSK: No joint effusions or erythema  SKIN: Warm and dry without cutaneous eruptions on Inspection/palpation.    NEURO: Oriented to PPT.  Motor 5/5 strength  PSYCHIATRIC:  Normal insight and judgment. Cooperative with PE    Laboratory Data    Results from last 7 days   Lab Units 04/09/23  0640 04/08/23  0538 04/07/23  1053   WBC 10*3/mm3 9.66 9.94 18.22*   HEMOGLOBIN g/dL 15.3 17.2 16.6   HEMATOCRIT % 43.9 47.4 46.4   PLATELETS 10*3/mm3 145 168 161     Results from last 7 days   Lab Units 04/09/23  0640   SODIUM mmol/L 144   POTASSIUM mmol/L 3.7   CHLORIDE mmol/L 107   CO2 mmol/L 26.0   BUN mg/dL 16   CREATININE mg/dL 0.80   GLUCOSE mg/dL 97   CALCIUM mg/dL 9.3     Results from last 7 days   Lab Units 04/07/23  1053   ALK PHOS U/L 48   BILIRUBIN mg/dL 0.5   ALT (SGPT) U/L 27   AST (SGOT) U/L 24             Results from last 7 days   Lab Units 04/07/23  1826   LACTATE mmol/L 1.7             Estimated Creatinine Clearance: 157.8 mL/min (by C-G formula based on SCr of 0.8 mg/dL).      Microbiology:  Microbiology Results (last 10 days)     Procedure Component Value - Date/Time    Meningitis / Encephalitis Panel, PCR - Cerebrospinal Fluid, Lumbar Puncture [[id number]]  (Normal) Collected: 04/07/23 1418    Lab Status: Final result Specimen: Cerebrospinal Fluid from Lumbar Puncture Updated: 04/07/23 1553     ESCHERICHIA COLI K1, PCR Not Detected     HAEMOPHILUS INFLUENZAE, PCR Not Detected     LISTERIA MONOCYTOGENES, PCR Not Detected     NEISSERIA MENINGITIDIS, PCR Not Detected     STREPTOCOCCUS AGALACTIAE, PCR Not Detected     STREPTOCOCCUS PNEUMONIAE, PCR Not Detected     CYTOMEGALOVIRUS (CMV), PCR Not Detected     ENTEROVIRUS, PCR Not Detected     HERPES SIMPLEX VIRUS 1 (HSV-1), PCR Not Detected     HERPES SIMPLEX VIRUS 2 (HSV-2), PCR Not Detected     HUMAN PARECHOVIRUS, PCR Not Detected     VARICELLA ZOSTER VIRUS (VZV), PCR Not Detected     CRYPTOCOCCUS NEOFORMANS / GATTII, PCR Not Detected     HUMAN HERPES VIRUS 6 PCR Not Detected    Culture, CSF - Cerebrospinal Fluid, Lumbar Puncture [[id number]] Collected: 04/07/23 1304    Lab Status: Preliminary result Specimen: Cerebrospinal Fluid  from Lumbar Puncture Updated: 04/09/23 0718     CSF Culture No growth at 2 days     Gram Stain No WBCs or organisms seen    Cryptococcal AG, CSF - Cerebrospinal Fluid, Lumbar Puncture [[id number]]  (Normal) Collected: 04/07/23 1304    Lab Status: Final result Specimen: Cerebrospinal Fluid from Lumbar Puncture Updated: 04/09/23 0734     Cryptococcal Antigen, CSF Negative    Blood Culture - Blood, Hand, Right [[id number]]  (Normal) Collected: 04/07/23 1243    Lab Status: Preliminary result Specimen: Blood from Hand, Right Updated: 04/09/23 1301     Blood Culture No growth at 2 days    Blood Culture - Blood, Hand, Left [[id number]]  (Normal) Collected: 04/07/23 1243    Lab Status: Preliminary result Specimen: Blood from Hand, Left Updated: 04/09/23 1301     Blood Culture No growth at 2 days    Respiratory Panel PCR w/COVID-19(SARS-CoV-2) JESSY/TAMMY/UILSES/PAD/COR/MAD/WALESKA In-House, NP Swab in UTM/VTM, 3-4 HR TAT - Swab, Nasopharynx [[id number]]  (Normal) Collected: 04/07/23 1227    Lab Status: Final result Specimen: Swab from Nasopharynx Updated: 04/07/23 1349     ADENOVIRUS, PCR Not Detected     Coronavirus 229E Not Detected     Coronavirus HKU1 Not Detected     Coronavirus NL63 Not Detected     Coronavirus OC43 Not Detected     COVID19 Not Detected     Human Metapneumovirus Not Detected     Human Rhinovirus/Enterovirus Not Detected     Influenza A PCR Not Detected     Influenza B PCR Not Detected     Parainfluenza Virus 1 Not Detected     Parainfluenza Virus 2 Not Detected     Parainfluenza Virus 3 Not Detected     Parainfluenza Virus 4 Not Detected     RSV, PCR Not Detected     Bordetella pertussis pcr Not Detected     Bordetella parapertussis PCR Not Detected     Chlamydophila pneumoniae PCR Not Detected     Mycoplasma pneumo by PCR Not Detected    Narrative:      In the setting of a positive respiratory panel with a viral infection PLUS a negative procalcitonin without other underlying concern for bacterial infection,  consider observing off antibiotics or discontinuation of antibiotics and continue supportive care. If the respiratory panel is positive for atypical bacterial infection (Bordetella pertussis, Chlamydophila pneumoniae, or Mycoplasma pneumoniae), consider antibiotic de-escalation to target atypical bacterial infection.                Radiology:  Imaging Results (Last 72 Hours)     Procedure Component Value Units Date/Time    CT Chest With & Without Contrast Diagnostic [[id number]] Collected: 04/08/23 2031     Updated: 04/08/23 2055    Narrative:      CT CHEST W WO CONTRAST DIAGNOSTIC, CT ABDOMEN PELVIS W WO CONTRAST    Date of Exam: 4/8/2023 7:08 PM EDT    Indication: Brain mass, evaluate for malignancy, metastatic workup    Comparison: CT head without contrast, MRI brain 4/7/2023    Technique: Axial CT images were obtained of the chest , abdomen, and pelvis before and after the uneventful intravenous administration of 100 mL Isovue 300.  Reconstructed coronal and sagittal images were also obtained. Automated exposure control and   iterative construction methods were used.    Findings:  Chest:  Thoracic aortic arch branch vasculature is patent. Heart size is normal. Negative for pericardial effusion. No pulmonary embolus. Negative for mediastinal, hilar, or axillary adenopathy. Bilateral gynecomastia noted.    Trachea and mainstem bronchi are patent. Lungs without consolidation. No pneumothorax. Mild linear atelectasis at the left lower lobe. No suspicious pulmonary nodule or mass. Thoracic vertebral bodies are maintained in height. Negative for aggressive   osseous lesion or fracture. Mild wall thickening of distal esophagus which may relate to reflux or esophagitis.    Abdomen and pelvis:  Initial noncontrast imaging demonstrates no radiodense renal calculus. No ureteral calculus. Subsequently post contrast images were obtained. Post surgical changes of prior hepatic transplant. No liver lesion. The spleen is normal  in size. Adrenal glands   are normal. Pancreas without evidence of pancreatitis. Is no pancreatic mass. Gallbladder absent. No biliary dilatation.     Kidneys are symmetrically enhancing. There is no enhancing or suspicious renal mass. No hydronephrosis or hydroureter. Urinary bladder is thin-walled. Prostate normal in size. Delayed images demonstrate normal contrast excretion into the nondilated   ureters.    Negative for pneumoperitoneum. No bowel obstruction. No fluid collection. The appendix is normal. Fluid-filled distention of the stomach. The portal vein and hepatic veins are patent. The splenic vein and superior mesenteric vein are patent. There are   prominent venous collaterals in the left upper quadrant extending into the left mid and lower abdomen adjacent to the infrarenal aorta which may relate to sequela of prior portal hypertension given history of liver transplant. Negative for central   mesenteric or retroperitoneal adenopathy. No aggressive osseous lesion or acute fracture. Disc narrowing lower lumbar spine at L5-S1.      Impression:      Impression:  1. No findings of primary malignancy or metastatic adenopathy in the chest, abdomen, or pelvis.  2. Post surgical changes of liver transplant.  3. Wall thickening at mid and distal esophagus, correlate for findings of reflux/esophagitis.  4. Additional incidental chronic findings above.    Electronically Signed: Juan Chandler    4/8/2023 8:52 PM EDT    Workstation ID: [id number]    CT Abdomen Pelvis With & Without Contrast [[id number]] Collected: 04/08/23 2031     Updated: 04/08/23 2055    Narrative:      CT CHEST W WO CONTRAST DIAGNOSTIC, CT ABDOMEN PELVIS W WO CONTRAST    Date of Exam: 4/8/2023 7:08 PM EDT    Indication: Brain mass, evaluate for malignancy, metastatic workup    Comparison: CT head without contrast, MRI brain 4/7/2023    Technique: Axial CT images were obtained of the chest , abdomen, and pelvis before and after the uneventful  intravenous administration of 100 mL Isovue 300.  Reconstructed coronal and sagittal images were also obtained. Automated exposure control and   iterative construction methods were used.    Findings:  Chest:  Thoracic aortic arch branch vasculature is patent. Heart size is normal. Negative for pericardial effusion. No pulmonary embolus. Negative for mediastinal, hilar, or axillary adenopathy. Bilateral gynecomastia noted.    Trachea and mainstem bronchi are patent. Lungs without consolidation. No pneumothorax. Mild linear atelectasis at the left lower lobe. No suspicious pulmonary nodule or mass. Thoracic vertebral bodies are maintained in height. Negative for aggressive   osseous lesion or fracture. Mild wall thickening of distal esophagus which may relate to reflux or esophagitis.    Abdomen and pelvis:  Initial noncontrast imaging demonstrates no radiodense renal calculus. No ureteral calculus. Subsequently post contrast images were obtained. Post surgical changes of prior hepatic transplant. No liver lesion. The spleen is normal in size. Adrenal glands   are normal. Pancreas without evidence of pancreatitis. Is no pancreatic mass. Gallbladder absent. No biliary dilatation.     Kidneys are symmetrically enhancing. There is no enhancing or suspicious renal mass. No hydronephrosis or hydroureter. Urinary bladder is thin-walled. Prostate normal in size. Delayed images demonstrate normal contrast excretion into the nondilated   ureters.    Negative for pneumoperitoneum. No bowel obstruction. No fluid collection. The appendix is normal. Fluid-filled distention of the stomach. The portal vein and hepatic veins are patent. The splenic vein and superior mesenteric vein are patent. There are   prominent venous collaterals in the left upper quadrant extending into the left mid and lower abdomen adjacent to the infrarenal aorta which may relate to sequela of prior portal hypertension given history of liver transplant.  Negative for central   mesenteric or retroperitoneal adenopathy. No aggressive osseous lesion or acute fracture. Disc narrowing lower lumbar spine at L5-S1.      Impression:      Impression:  1. No findings of primary malignancy or metastatic adenopathy in the chest, abdomen, or pelvis.  2. Post surgical changes of liver transplant.  3. Wall thickening at mid and distal esophagus, correlate for findings of reflux/esophagitis.  4. Additional incidental chronic findings above.    Electronically Signed: Juan Mouser    4/8/2023 8:52 PM EDT    Workstation ID: [id number]    MRI Brain With & Without Contrast [[id number]] Collected: 04/07/23 1627     Updated: 04/07/23 1636    Narrative:        MRI BRAIN W WO CONTRAST    Date of Exam: 4/7/2023 3:30 PM EDT    Indication: new onset sz protocol.     Comparison: CT earlier the same day    Technique:  Routine multiplanar/multisequence sequence images of the brain were obtained before and after the uneventful administration of  20 mL Multihance.    Findings:   Corresponding to the area of apparent low density within the left basal ganglia seen on same-day CT, there is a somewhat irregular, partially cystic and avidly enhancing mass measuring 2.2 x 2.0 cm. There is either a separate nodule or small satellite   nodule present posteriorly measuring 8 mm, also enhancing. Moderate surrounding edema is present on FLAIR sequences, extending to involve the left medial temporal lobe. No additional mass or abnormal enhancement is present. The ventricles are normal in   size and configuration. No acute infarct is present on diffusion weighted sequences. The orbits are normal. The paranasal sinuses are grossly clear.      Impression:      Impression:   2.2 cm enhancing and partially cystic mass of the left basal ganglia as above, with surrounding edema extending towards the insular and left temporal regions, favoring primary glial neoplasm over metastasis. Recommend neurosurgical  consultation.    Electronically Signed: Christiano Nino    4/7/2023 4:32 PM EDT    Workstation ID: [id number]    CT Head Without Contrast [[id number]] Collected: 04/07/23 1213     Updated: 04/07/23 1218    Narrative:      CT HEAD WO CONTRAST    Date of Exam: 4/7/2023 11:50 AM EDT    Indication: new onset sz.    Comparison: None available.    Technique: Axial CT images were obtained of the head without contrast administration.  Reconstructed coronal and sagittal images were also obtained. Automated exposure control and iterative construction methods were used.    Findings:  Faint hypodensity is seen within the left basal ganglia, likely either chronic lacunar infarct or prominent perivascular space. There is otherwise no evidence of intracranial hemorrhage, mass or mass effect. The ventricles are normal in size and   configuration. The orbits are normal. The paranasal sinuses are grossly clear. The calvarium is intact.      Impression:      Impression:  Faint hypodensity is seen within the left basal ganglia, likely either chronic lacunar infarct or prominent perivascular space.     No acute intracranial abnormality otherwise.    Electronically Signed: Christiano Nino    4/7/2023 12:15 PM EDT    Workstation ID: [id number]    XR Chest 1 View [[id number]] Collected: 04/07/23 1131     Updated: 04/07/23 1134    Narrative:      XR CHEST 1 VW    Date of Exam: 4/7/2023 10:53 AM EDT    Indication: Weak/Dizzy/AMS triage protocol.    Comparison: None available.    FINDINGS: No focal airspace opacity. No pleural effusion or pneumothorax. Normal heart and mediastinal contours.      Impression:      No evidence of acute disease in the chest.     Electronically Signed: Christiano Nino    4/7/2023 11:31 AM EDT    Workstation ID: [id number]      I read his radiographic images.  I reviewed the images with the patient and his wife yesterday.      Impression:   13. 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  Since he is an  immunocompromised transplant patient, he will be at risk for opportunistic infection.  He will require a biopsy for diagnosis.  Potential infectious etiologies include a aerobic/anaerobic bacterial infection, cryptococcoma, other fungal infections such as histoplasmosis/blastomycosis, tuberculoma, toxoplasmosis, nocardia, and actinomyces.  NATALI virus induced PML generally has multifocal lesions but this would be another consideration.  Brain tissue should be sent for histology, aerobic/anaerobic/mycobacterial/fungal cultures and I would like to save tissue to send for 16s ribosomal RNA next generation sequencing at the [facility] if the pathology does not demonstrate tumor and is more suggestive of infection.   14. New onset of seizure.  On Keppra  15. Leukocytosis/neutrophilia, stress vs infection  16. Lactic acidosis, stress vs other  17. H/o Liver cancer s/p liver transplant 2013 on Tacrolimus.  18. Immunocompromised host.    PLAN/RECOMMENDATIONS:   1. Toxoplasma PCR on CSF  2. Quantiferon gold TB--pending, and Toxoplasma serologies--pending.  3. Histoplasma urinary antigen and Blastomyces urinary antigen--pending  4. Brain biopsy tomorrow for pathology, aerobic/anaerobic/mycobacterial/fungal culture, and an additional sample for possible 16 S ribosomal RNA next generation sequencing    I discussed his complex situation and processing of his brain tissue specimen with Ninfa Ramsey in the lab.    Duncan Pradhan MD  4/10/2023  06:36 EDT                    Electronically signed by Duncan Pradhan MD at 04/10/23 1612     Alyssa Galloway MD at 23 163          DOS: 2023  NAME: Cheo Garrett   : 1975  PCP: Provider, No Known  Chief Complaint   Patient presents with   • Altered Mental Status       Chief complaint: He has a slight headache but nothing too much to complain about.  Subjective: No further seizures noted and he is mentally back to baseline.    Objective:  Vital  "signs: /85 (BP Location: Right arm, Patient Position: Lying)   Pulse 88   Temp 98.2 °F (36.8 °C) (Oral)   Resp 18   Ht 193 cm (75.98\")   Wt 109 kg (239 lb 12.8 oz)   SpO2 96%   BMI 29.20 kg/m²    Gen: NAD, vitals reviewed  MS: Back to baseline.  CN: Cranials 2-12: No focal deficits noted.  Motor: Muscles of both upper and lower extremities show good bulk power and tone.  Sensory: No sensory loss noted.  DTRs: 2+ bilaterally with downgoing toes.  Coordination: Normal finger-to-nose coordination.  Gait: He is able to get up and ambulate independently and his Romberg is negative and the Floyd balance test is negative.    ROS:  General: Pleasant gentleman currently getting up and walking independently.  Neurological: No focal neurological deficits noted.  Has a mild headache.    Laboratory results:  Lab Results   Component Value Date    GLUCOSE 97 04/09/2023    CALCIUM 9.3 04/09/2023     04/09/2023    K 3.7 04/09/2023    CO2 26.0 04/09/2023     04/09/2023    BUN 16 04/09/2023    CREATININE 0.80 04/09/2023    BCR 20.0 04/09/2023    ANIONGAP 11.0 04/09/2023     Lab Results   Component Value Date    WBC 9.66 04/09/2023    HGB 15.3 04/09/2023    HCT 43.9 04/09/2023    MCV 86.6 04/09/2023     04/09/2023          Review of labs: Electrolytes are normal and the CBC is completely unremarkable.    Review and interpretation of imaging: The MRI of the brain with and without contrast was reviewed in details that shows the following:    Findings:   Corresponding to the area of apparent low density within the left basal ganglia seen on same-day CT, there is a somewhat irregular, partially cystic and avidly enhancing mass measuring 2.2 x 2.0 cm. There is either a separate nodule or small satellite   nodule present posteriorly measuring 8 mm, also enhancing. Moderate surrounding edema is present on FLAIR sequences, extending to involve the left medial temporal lobe. No additional mass or abnormal " enhancement is present. The ventricles are normal in   size and configuration. No acute infarct is present on diffusion weighted sequences. The orbits are normal. The paranasal sinuses are grossly clear.     IMPRESSION:  Impression:   2.2 cm enhancing and partially cystic mass of the left basal ganglia as above, with surrounding edema extending towards the insular and left temporal regions, favoring primary glial neoplasm over metastasis. Recommend neurosurgical consultation    Workup to date: An EEG was performed that shows the following:    Findings:     The patient is awake.  The background shows diffuse medium amplitude 4-6 Hz intermixed delta and theta activity which is present symmetrically over both hemispheres.  A clear posterior rhythm is not seen.  EMG artifact is variably prominent anteriorly.  Photic stimulation does not change the background.  Hyperventilation is not performed.  Sleep is seen with slowing of the tracing and vertex waves and sleep spindles.  No focal features are seen.  No epileptiform activity is present.  No ongoing seizures are seen.     Video: Available     Technical quality: Superior     EKG: Regular, 70 beats per     SUMMARY:     Mild-moderate generalized slow     No focal features or epileptiform activity are seen     IMPRESSION:     Diffuse cerebral dysfunction of mild degree but nonspecific     No ongoing seizures are seen    Diagnoses: Left temporal frontal seizure focus which is in conjunction with the brain mass noted.    CT scan of the chest abdomen and pelvis were reviewed in details that shows the following:  Chest:  Thoracic aortic arch branch vasculature is patent. Heart size is normal. Negative for pericardial effusion. No pulmonary embolus. Negative for mediastinal, hilar, or axillary adenopathy. Bilateral gynecomastia noted.     Trachea and mainstem bronchi are patent. Lungs without consolidation. No pneumothorax. Mild linear atelectasis at the left lower lobe. No  suspicious pulmonary nodule or mass. Thoracic vertebral bodies are maintained in height. Negative for aggressive   osseous lesion or fracture. Mild wall thickening of distal esophagus which may relate to reflux or esophagitis.     Abdomen and pelvis:  Initial noncontrast imaging demonstrates no radiodense renal calculus. No ureteral calculus. Subsequently post contrast images were obtained. Post surgical changes of prior hepatic transplant. No liver lesion. The spleen is normal in size. Adrenal glands   are normal. Pancreas without evidence of pancreatitis. Is no pancreatic mass. Gallbladder absent. No biliary dilatation.      Kidneys are symmetrically enhancing. There is no enhancing or suspicious renal mass. No hydronephrosis or hydroureter. Urinary bladder is thin-walled. Prostate normal in size. Delayed images demonstrate normal contrast excretion into the nondilated   ureters.     Negative for pneumoperitoneum. No bowel obstruction. No fluid collection. The appendix is normal. Fluid-filled distention of the stomach. The portal vein and hepatic veins are patent. The splenic vein and superior mesenteric vein are patent. There are   prominent venous collaterals in the left upper quadrant extending into the left mid and lower abdomen adjacent to the infrarenal aorta which may relate to sequela of prior portal hypertension given history of liver transplant. Negative for central   mesenteric or retroperitoneal adenopathy. No aggressive osseous lesion or acute fracture. Disc narrowing lower lumbar spine at L5-S1.     IMPRESSION:  Impression:  1. No findings of primary malignancy or metastatic adenopathy in the chest, abdomen, or pelvis.  2. Post surgical changes of liver transplant.  3. Wall thickening at mid and distal esophagus, correlate for findings of reflux/esophagitis.  4. Additional incidental chronic findings above.       Comment: Patient with most probably primary brain tumor the appearance of which seems to  be more likely to be glioblastoma multiforme.    Plan:  1.  Patient will be getting stereotactic biopsy of the brain tumor coming Tuesday with Dr. Gibbons.  2.  Discussed that he can start getting up and walking around.  3.  He has been also instructed not to drive or use any hazardous equipment or engage in any hazardous activities until seizure-free for 6 months as per the state law of [place].    Discussed with the patient and his wife and the nursing staff and he will be followed up by our inpatient neurology team.    Spent a total of 30 minutes in face-to-face evaluation and management of the patient.    Electronically signed by Alyssa Galloway MD, 04/09/23, 4:35 PM EDT.      Electronically signed by Alyssa Galloway MD at 04/09/23 1643     Duncan Pradhan MD at 04/09/23 1501              INFECTIOUS DISEASE PROGRESS NOTE    Cheo Garrett  1975  [id number]    Date of Consult: 4/8/2023    Admission Date: 4/7/2023      Requesting Provider: Roxanne Iverson DO  Evaluating Physician: Duncan Pradhan MD    Reason for Consultation: immunocompromised, brain lesion    History of present illness:    4/8/23: Patient is a 47 y.o. male with h/o liver cancer and liver transplantation 2013 at [facility]/on Tacrolimus who we were asked to see for possible infectious brain lesion.  The patient was brought to [facility] ED on 4/7 by EMS after his wife witness the patient having a seizure at her family's home. They are from [place], but are visiting family near [place], [place].   The patient has no h/o seizures and has been relatively healthy since his liver transplantation.   He states he does no remember leading up to his being taken to [facility].  On arrival, the patient is afebrile.  Admitting labs are WBC 18,200 with 86% neutrophils, lactic acid 6.4, PCT 0.03, Creatinine 1.08, ammonia 49, and UA WBC 0-2.  A UDS was negative.  A respiratory panel PCR was negative.  Blood cultures are negative to date. A CXR showed no  acute findings.  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  He underwent an LP yesterday with WBC CSF zero, glucose 88, and protein 30.7. A Meningitis/encephalitis panel PCR was negative.  A toxoplasma PCR from CSF is pending.  A CSF culture is negative to date. He is currently on no antibiotics. ID was asked to evaluate.  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     4/9/23:He denies headache, earache, sore throat.  He has remained afebrile.  He has had no further seizures.His white blood cell count today is 9.7.  His creatinine is 0.8.  Serum cryptococcal antigen was negative.  He denies nausea, vomiting, diarrhea.    Past Medical History:   Diagnosis Date   • Liver cancer        History reviewed. No pertinent surgical history.   Liver transplantation 2013    History reviewed. No pertinent family history.    Social History     Socioeconomic History   • Marital status:    Denies tobacco, alcohol, or illicit drug use.   Lives in    No Known Allergies      Medication:    Current Facility-Administered Medications:   •  acetaminophen (TYLENOL) tablet 650 mg, 650 mg, Oral, Q4H PRN, Roxanne Iverson, DO  •  Calcium Replacement - Follow Nurse / BPA Driven Protocol, , Does not apply, PRN, Roxanne Iverson DO  •  levETIRAcetam (KEPPRA) tablet 1,000 mg, 1,000 mg, Oral, Q12H, Alyssa Galloway MD, 1,000 mg at 04/09/23 0850  •  LORazepam (ATIVAN) injection 1 mg, 1 mg, Intravenous, Q2H PRN, Roxanne Iverson DO  •  LORazepam (ATIVAN) injection 2 mg, 2 mg, Intravenous, Q5 Min PRN, Alyssa Galloway MD  •  Magnesium Standard Dose Replacement - Follow Nurse / BPA Driven Protocol, , Does not apply, PRN, Roxanne Iverson, DO  •  ondansetron (ZOFRAN) injection 4 mg, 4 mg, Intravenous, Q6H PRN, Roxanne Iverson, DO  •  Phosphorus Replacement - Follow Nurse / BPA Driven Protocol, , Does not  apply, PRN, Roxanne Iverson R, DO  •  Potassium Replacement - Follow Nurse / BPA Driven Protocol, , Does not apply, PRN, Roxanne Iverson R, DO  •  sodium chloride 0.9 % flush 10 mL, 10 mL, Intravenous, PRN, Roxanne Iverson R, DO, 10 mL at 23 1913  •  sodium chloride 0.9 % flush 10 mL, 10 mL, Intravenous, Q12H, Roxanne Iverson R, DO, 10 mL at 23 0850  •  sodium chloride 0.9 % flush 10 mL, 10 mL, Intravenous, PRN, Roxanne Iverson R, DO  •  sodium chloride 0.9 % infusion 40 mL, 40 mL, Intravenous, PRN, Roxanne Iverson R, DO  •  tacrolimus (PROGRAF) capsule 1.5 mg, 1.5 mg, Oral, Q12H, Roxanne Iverson R, DO, 1.5 mg at 23 1006    Antibiotics:  Anti-Infectives (From admission, onward)    None            Review of Systems:  See above      Physical Exam:   Vital Signs  Temp (24hrs), Av.1 °F (36.7 °C), Min:97.7 °F (36.5 °C), Max:98.3 °F (36.8 °C)    Temp  Min: 97.7 °F (36.5 °C)  Max: 98.3 °F (36.8 °C)  BP  Min: 125/79  Max: 143/85  Pulse  Min: 79  Max: 102  Resp  Min: 18  Max: 18  SpO2  Min: 91 %  Max: 100 %    GENERAL: Awake and alert, in no acute distress.   HEENT: Normocephalic, atraumatic.  PERRL. EOMI. No conjunctival injection. No icterus. Oropharynx clear without evidence of thrush or exudate.  NECK: Supple  LYMPH: No cervical, axillary or inguinal lymphadenopathy.  HEART: RRR; No murmur, rubs, gallops.   LUNGS: Clear to auscultation bilaterally without wheezing, rales, rhonchi. Normal respiratory effort. Nonlabored.  ABDOMEN: Soft, nontender, nondistended.. No rebound or guarding. NO mass or HSM  :  Without Bailey catheter.  MSK: No joint effusions or erythema  SKIN: Warm and dry without cutaneous eruptions on Inspection/palpation.    NEURO: Oriented to PPT.  Motor 5/5 strength  PSYCHIATRIC: Normal insight and judgment. Cooperative with PE    Laboratory Data    Results from last 7 days   Lab Units 23  0640 23  0538 23  1053   WBC 10*3/mm3 9.66 9.94 18.22*   HEMOGLOBIN g/dL 15.3  17.2 16.6   HEMATOCRIT % 43.9 47.4 46.4   PLATELETS 10*3/mm3 145 168 161     Results from last 7 days   Lab Units 04/09/23  0640   SODIUM mmol/L 144   POTASSIUM mmol/L 3.7   CHLORIDE mmol/L 107   CO2 mmol/L 26.0   BUN mg/dL 16   CREATININE mg/dL 0.80   GLUCOSE mg/dL 97   CALCIUM mg/dL 9.3     Results from last 7 days   Lab Units 04/07/23  1053   ALK PHOS U/L 48   BILIRUBIN mg/dL 0.5   ALT (SGPT) U/L 27   AST (SGOT) U/L 24             Results from last 7 days   Lab Units 04/07/23  1826   LACTATE mmol/L 1.7             Estimated Creatinine Clearance: 157.8 mL/min (by C-G formula based on SCr of 0.8 mg/dL).      Microbiology:  Microbiology Results (last 10 days)     Procedure Component Value - Date/Time    Meningitis / Encephalitis Panel, PCR - Cerebrospinal Fluid, Lumbar Puncture [[id number]]  (Normal) Collected: 04/07/23 1418    Lab Status: Final result Specimen: Cerebrospinal Fluid from Lumbar Puncture Updated: 04/07/23 1553     ESCHERICHIA COLI K1, PCR Not Detected     HAEMOPHILUS INFLUENZAE, PCR Not Detected     LISTERIA MONOCYTOGENES, PCR Not Detected     NEISSERIA MENINGITIDIS, PCR Not Detected     STREPTOCOCCUS AGALACTIAE, PCR Not Detected     STREPTOCOCCUS PNEUMONIAE, PCR Not Detected     CYTOMEGALOVIRUS (CMV), PCR Not Detected     ENTEROVIRUS, PCR Not Detected     HERPES SIMPLEX VIRUS 1 (HSV-1), PCR Not Detected     HERPES SIMPLEX VIRUS 2 (HSV-2), PCR Not Detected     HUMAN PARECHOVIRUS, PCR Not Detected     VARICELLA ZOSTER VIRUS (VZV), PCR Not Detected     CRYPTOCOCCUS NEOFORMANS / GATTII, PCR Not Detected     HUMAN HERPES VIRUS 6 PCR Not Detected    Culture, CSF - Cerebrospinal Fluid, Lumbar Puncture [[id number]] Collected: 04/07/23 1304    Lab Status: Preliminary result Specimen: Cerebrospinal Fluid from Lumbar Puncture Updated: 04/09/23 0718     CSF Culture No growth at 2 days     Gram Stain No WBCs or organisms seen    Cryptococcal AG, CSF - Cerebrospinal Fluid, Lumbar Puncture [[id number]]  (Normal)  Collected: 04/07/23 1304    Lab Status: Final result Specimen: Cerebrospinal Fluid from Lumbar Puncture Updated: 04/09/23 0734     Cryptococcal Antigen, CSF Negative    Blood Culture - Blood, Hand, Right [[id number]]  (Normal) Collected: 04/07/23 1243    Lab Status: Preliminary result Specimen: Blood from Hand, Right Updated: 04/09/23 1301     Blood Culture No growth at 2 days    Blood Culture - Blood, Hand, Left [[id number]]  (Normal) Collected: 04/07/23 1243    Lab Status: Preliminary result Specimen: Blood from Hand, Left Updated: 04/09/23 1301     Blood Culture No growth at 2 days    Respiratory Panel PCR w/COVID-19(SARS-CoV-2) JESSY/TAMMY/ULISES/PAD/COR/MAD/[facility]ALESKA In-House, NP Swab in UTM/VTM, 3-4 HR TAT - Swab, Nasopharynx [[id number]]  (Normal) Collected: 04/07/23 1227    Lab Status: Final result Specimen: Swab from Nasopharynx Updated: 04/07/23 1349     ADENOVIRUS, PCR Not Detected     Coronavirus 229E Not Detected     Coronavirus HKU1 Not Detected     Coronavirus NL63 Not Detected     Coronavirus OC43 Not Detected     COVID19 Not Detected     Human Metapneumovirus Not Detected     Human Rhinovirus/Enterovirus Not Detected     Influenza A PCR Not Detected     Influenza B PCR Not Detected     Parainfluenza Virus 1 Not Detected     Parainfluenza Virus 2 Not Detected     Parainfluenza Virus 3 Not Detected     Parainfluenza Virus 4 Not Detected     RSV, PCR Not Detected     Bordetella pertussis pcr Not Detected     Bordetella parapertussis PCR Not Detected     Chlamydophila pneumoniae PCR Not Detected     Mycoplasma pneumo by PCR Not Detected    Narrative:      In the setting of a positive respiratory panel with a viral infection PLUS a negative procalcitonin without other underlying concern for bacterial infection, consider observing off antibiotics or discontinuation of antibiotics and continue supportive care. If the respiratory panel is positive for atypical bacterial infection (Bordetella pertussis, Chlamydophila  pneumoniae, or Mycoplasma pneumoniae), consider antibiotic de-escalation to target atypical bacterial infection.                Radiology:  Imaging Results (Last 72 Hours)     Procedure Component Value Units Date/Time    CT Chest With & Without Contrast Diagnostic [[id number]] Collected: 04/08/23 2031     Updated: 04/08/23 2055    Narrative:      CT CHEST W WO CONTRAST DIAGNOSTIC, CT ABDOMEN PELVIS W WO CONTRAST    Date of Exam: 4/8/2023 7:08 PM EDT    Indication: Brain mass, evaluate for malignancy, metastatic workup    Comparison: CT head without contrast, MRI brain 4/7/2023    Technique: Axial CT images were obtained of the chest , abdomen, and pelvis before and after the uneventful intravenous administration of 100 mL Isovue 300.  Reconstructed coronal and sagittal images were also obtained. Automated exposure control and   iterative construction methods were used.    Findings:  Chest:  Thoracic aortic arch branch vasculature is patent. Heart size is normal. Negative for pericardial effusion. No pulmonary embolus. Negative for mediastinal, hilar, or axillary adenopathy. Bilateral gynecomastia noted.    Trachea and mainstem bronchi are patent. Lungs without consolidation. No pneumothorax. Mild linear atelectasis at the left lower lobe. No suspicious pulmonary nodule or mass. Thoracic vertebral bodies are maintained in height. Negative for aggressive   osseous lesion or fracture. Mild wall thickening of distal esophagus which may relate to reflux or esophagitis.    Abdomen and pelvis:  Initial noncontrast imaging demonstrates no radiodense renal calculus. No ureteral calculus. Subsequently post contrast images were obtained. Post surgical changes of prior hepatic transplant. No liver lesion. The spleen is normal in size. Adrenal glands   are normal. Pancreas without evidence of pancreatitis. Is no pancreatic mass. Gallbladder absent. No biliary dilatation.     Kidneys are symmetrically enhancing. There is no  enhancing or suspicious renal mass. No hydronephrosis or hydroureter. Urinary bladder is thin-walled. Prostate normal in size. Delayed images demonstrate normal contrast excretion into the nondilated   ureters.    Negative for pneumoperitoneum. No bowel obstruction. No fluid collection. The appendix is normal. Fluid-filled distention of the stomach. The portal vein and hepatic veins are patent. The splenic vein and superior mesenteric vein are patent. There are   prominent venous collaterals in the left upper quadrant extending into the left mid and lower abdomen adjacent to the infrarenal aorta which may relate to sequela of prior portal hypertension given history of liver transplant. Negative for central   mesenteric or retroperitoneal adenopathy. No aggressive osseous lesion or acute fracture. Disc narrowing lower lumbar spine at L5-S1.      Impression:      Impression:  1. No findings of primary malignancy or metastatic adenopathy in the chest, abdomen, or pelvis.  2. Post surgical changes of liver transplant.  3. Wall thickening at mid and distal esophagus, correlate for findings of reflux/esophagitis.  4. Additional incidental chronic findings above.    Electronically Signed: Juan Chandler    4/8/2023 8:52 PM EDT    Workstation ID: [id number]    CT Abdomen Pelvis With & Without Contrast [[id number]] Collected: 04/08/23 2031     Updated: 04/08/23 2055    Narrative:      CT CHEST W WO CONTRAST DIAGNOSTIC, CT ABDOMEN PELVIS W WO CONTRAST    Date of Exam: 4/8/2023 7:08 PM EDT    Indication: Brain mass, evaluate for malignancy, metastatic workup    Comparison: CT head without contrast, MRI brain 4/7/2023    Technique: Axial CT images were obtained of the chest , abdomen, and pelvis before and after the uneventful intravenous administration of 100 mL Isovue 300.  Reconstructed coronal and sagittal images were also obtained. Automated exposure control and   iterative construction methods were  used.    Findings:  Chest:  Thoracic aortic arch branch vasculature is patent. Heart size is normal. Negative for pericardial effusion. No pulmonary embolus. Negative for mediastinal, hilar, or axillary adenopathy. Bilateral gynecomastia noted.    Trachea and mainstem bronchi are patent. Lungs without consolidation. No pneumothorax. Mild linear atelectasis at the left lower lobe. No suspicious pulmonary nodule or mass. Thoracic vertebral bodies are maintained in height. Negative for aggressive   osseous lesion or fracture. Mild wall thickening of distal esophagus which may relate to reflux or esophagitis.    Abdomen and pelvis:  Initial noncontrast imaging demonstrates no radiodense renal calculus. No ureteral calculus. Subsequently post contrast images were obtained. Post surgical changes of prior hepatic transplant. No liver lesion. The spleen is normal in size. Adrenal glands   are normal. Pancreas without evidence of pancreatitis. Is no pancreatic mass. Gallbladder absent. No biliary dilatation.     Kidneys are symmetrically enhancing. There is no enhancing or suspicious renal mass. No hydronephrosis or hydroureter. Urinary bladder is thin-walled. Prostate normal in size. Delayed images demonstrate normal contrast excretion into the nondilated   ureters.    Negative for pneumoperitoneum. No bowel obstruction. No fluid collection. The appendix is normal. Fluid-filled distention of the stomach. The portal vein and hepatic veins are patent. The splenic vein and superior mesenteric vein are patent. There are   prominent venous collaterals in the left upper quadrant extending into the left mid and lower abdomen adjacent to the infrarenal aorta which may relate to sequela of prior portal hypertension given history of liver transplant. Negative for central   mesenteric or retroperitoneal adenopathy. No aggressive osseous lesion or acute fracture. Disc narrowing lower lumbar spine at L5-S1.      Impression:       Impression:  1. No findings of primary malignancy or metastatic adenopathy in the chest, abdomen, or pelvis.  2. Post surgical changes of liver transplant.  3. Wall thickening at mid and distal esophagus, correlate for findings of reflux/esophagitis.  4. Additional incidental chronic findings above.    Electronically Signed: Juan Chandler    4/8/2023 8:52 PM EDT    Workstation ID: [id number]    MRI Brain With & Without Contrast [[id number]] Collected: 04/07/23 1627     Updated: 04/07/23 1636    Narrative:        MRI BRAIN W WO CONTRAST    Date of Exam: 4/7/2023 3:30 PM EDT    Indication: new onset sz protocol.     Comparison: CT earlier the same day    Technique:  Routine multiplanar/multisequence sequence images of the brain were obtained before and after the uneventful administration of  20 mL Multihance.    Findings:   Corresponding to the area of apparent low density within the left basal ganglia seen on same-day CT, there is a somewhat irregular, partially cystic and avidly enhancing mass measuring 2.2 x 2.0 cm. There is either a separate nodule or small satellite   nodule present posteriorly measuring 8 mm, also enhancing. Moderate surrounding edema is present on FLAIR sequences, extending to involve the left medial temporal lobe. No additional mass or abnormal enhancement is present. The ventricles are normal in   size and configuration. No acute infarct is present on diffusion weighted sequences. The orbits are normal. The paranasal sinuses are grossly clear.      Impression:      Impression:   2.2 cm enhancing and partially cystic mass of the left basal ganglia as above, with surrounding edema extending towards the insular and left temporal regions, favoring primary glial neoplasm over metastasis. Recommend neurosurgical consultation.    Electronically Signed: Christiano Nino    4/7/2023 4:32 PM EDT    Workstation ID: [id number]    CT Head Without Contrast [[id number]] Collected: 04/07/23 1213     Updated: 04/07/23  1218    Narrative:      CT HEAD WO CONTRAST    Date of Exam: 4/7/2023 11:50 AM EDT    Indication: new onset sz.    Comparison: None available.    Technique: Axial CT images were obtained of the head without contrast administration.  Reconstructed coronal and sagittal images were also obtained. Automated exposure control and iterative construction methods were used.    Findings:  Faint hypodensity is seen within the left basal ganglia, likely either chronic lacunar infarct or prominent perivascular space. There is otherwise no evidence of intracranial hemorrhage, mass or mass effect. The ventricles are normal in size and   configuration. The orbits are normal. The paranasal sinuses are grossly clear. The calvarium is intact.      Impression:      Impression:  Faint hypodensity is seen within the left basal ganglia, likely either chronic lacunar infarct or prominent perivascular space.     No acute intracranial abnormality otherwise.    Electronically Signed: Christiano Nino    4/7/2023 12:15 PM EDT    Workstation ID: [id number]    XR Chest 1 View [[id number]] Collected: 04/07/23 1131     Updated: 04/07/23 1134    Narrative:      XR CHEST 1 VW    Date of Exam: 4/7/2023 10:53 AM EDT    Indication: Weak/Dizzy/AMS triage protocol.    Comparison: None available.    FINDINGS: No focal airspace opacity. No pleural effusion or pneumothorax. Normal heart and mediastinal contours.      Impression:      No evidence of acute disease in the chest.     Electronically Signed: Christiano Nino    4/7/2023 11:31 AM EDT    Workstation ID: [id number]      I read his radiographic images.  I reviewed the images with the patient and his wife yesterday.      Impression:   19. 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  Since he is an immunocompromised transplant patient, he will be at risk for opportunistic infection.  I doubt that we will be able to make a diagnosis based on his CSF.  He will require a biopsy for diagnosis.  Potential  infectious etiologies include a aerobic/anaerobic bacterial infection, cryptococcoma, other fungal infections such as histoplasmosis/blastomycosis, tuberculoma, toxoplasmosis, nocardia, and actinomyces.  NATALI virus induced PML generally has multifocal lesions but this would be another consideration.  Brain tissue should be sent for histology, aerobic/anaerobic/mycobacterial/fungal cultures and I would like to save tissue to send for 16s ribosomal RNA next generation sequencing at the [facility] if the pathology does not demonstrate tumor and is more suggestive of infection.   20. New onset of seizure.  On Keppra  21. Leukocytosis/neutrophilia, stress vs infection  22. Lactic acidosis, stress vs other  23. H/o Liver cancer s/p liver transplant 2013 on Tacrolimus.  24. Immunocompromised host.    PLAN/RECOMMENDATIONS:   5. Toxoplasma PCR on CSF  6. Quantiferon gold TB--pending, and Toxoplasma serologies--pending.  7. Histoplasma urinary antigen and Blastomyces urinary antigen--pending  8. Awaiting biopsy of brain lesion on , please save some tissue for 16s ribosomal RNA next generation sequencing studies.    9. Follow off antibiotics for now.    His high complexity medical problems required high complexity medical decision making today.      Duncan Pradhan MD  2023  19:41 EDT                    Electronically signed by Duncan Pradhan MD at 23 1947     Gonzales Gibbons MD at 23 1108          Chief complaint: Brain mass, seizures    Admit Diagnosis:   Seizure [R56.9]  Seizures [R56.9]     Subjective: No events overnight    Objective:    Vitals:    23 0715   BP: 127/84   Pulse: 79   Resp: 18   Temp: 98.2 °F (36.8 °C)   SpO2: 91%     Pulse  Av.8  Min: 79  Max: 97  Systolic (24hrs), Av , Min:125 , Max:143     Diastolic (24hrs), Av, Min:75, Max:95    Temp (24hrs), Av °F (36.7 °C), Min:97.6 °F (36.4 °C), Max:98.3 °F (36.8 °C)      He is sitting in the  bedside couch.  He is alert, pleasant, conversant, and appropriate.  He is ambulating without difficulty.  Cranial nerves II through XII are grossly intact.    Lab Results   Component Value Date     2023       A/P:   Admit Diagnosis:   Seizure [R56.9]  Seizures [R56.9]     He certainly is going to need a biopsy of this mass.  The location of the mass is very problematic.  I discussed the rationale for needle biopsy versus an open biopsy/resection.  I think this will be very difficult to completely remove and a pterional, trans sylvian approach certainly carries the risk of MCA  stroke.  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  He is tentatively scheduled for needle biopsy on Tuesday.  Final recommendations will be left tomorrow.      Electronically signed by Gonzales Gibbons MD at 23 1110     Roxanne Iverson DO at 23 1036              [facility] Medicine Services  PROGRESS NOTE    Patient Name: Cheo Garrett  : 1975  MRN: [id number]    Date of Admission: 2023  Primary Care Physician: Provider, No Known    Subjective   Subjective     CC:  F/u seizures    HPI:  Patient up walking around his room, his wife mentions that he had some auditory hallucinations overnight, otherwise he is doing well.    ROS:  Gen- No fevers, chills  CV- No chest pain, palpitations  Resp- No cough, dyspnea  GI- No N/V/D, abd pain    Objective   Objective     Vital Signs:   Temp:  [97.6 °F (36.4 °C)-98.3 °F (36.8 °C)] 98.2 °F (36.8 °C)  Heart Rate:  [79-97] 79  Resp:  [16-18] 18  BP: (125-143)/(75-95) 127/84     Physical Exam:  Constitutional: No acute distress, awake, alert, up walking around room  HENT: NCAT, mucous membranes moist  Respiratory:  respiratory effort normal   Cardiovascular: RRR, no murmurs, rubs, or gallops  Musculoskeletal: No bilateral ankle  edema  Psychiatric: Appropriate affect, cooperative  Neurologic: Oriented x3, speech clear, no focal deficits currently  Skin: No rashes      Results Reviewed:  LAB RESULTS:      Lab 04/09/23  0640 04/08/23  0538 04/07/23  1826 04/07/23  1243 04/07/23  1053   WBC 9.66 9.94  --   --  18.22*   HEMOGLOBIN 15.3 17.2  --   --  16.6   HEMATOCRIT 43.9 47.4  --   --  46.4   PLATELETS 145 168  --   --  161   NEUTROS ABS 5.92 8.20*  --   --  15.73*   IMMATURE GRANS (ABS) 0.09* 0.04  --   --  0.10*   LYMPHS ABS 2.39 1.30  --   --  0.55*   MONOS ABS 1.15* 0.38  --   --  1.79*   EOS ABS 0.05 0.00  --   --  0.01   MCV 86.6 85.3  --   --  86.7   PROCALCITONIN  --   --   --   --  0.03   LACTATE  --   --  1.7 4.0* 6.4*   PROTIME  --   --   --   --  14.0         Lab 04/09/23  0640 04/08/23  0538 04/07/23  1053   SODIUM 144 140 140   POTASSIUM 3.7 4.0 3.1*   CHLORIDE 107 105 104   CO2 26.0 23.0 19.0*   ANION GAP 11.0 12.0 17.0*   BUN 16 13 12   CREATININE 0.80 0.82 1.08   EGFR 111.2 110.4 86.2   GLUCOSE 97 153* 151*   CALCIUM 9.3 9.4 8.8   MAGNESIUM  --   --  1.9   TSH  --   --  1.350         Lab 04/07/23  1053   TOTAL PROTEIN 6.5   ALBUMIN 4.2   GLOBULIN 2.3   ALT (SGPT) 27   AST (SGOT) 24   BILIRUBIN 0.5   ALK PHOS 48         Lab 04/07/23  1053   HSTROP T 9   PROTIME 14.0   INR 1.08                 Brief Urine Lab Results  (Last result in the past 365 days)      Color   Clarity   Blood   Leuk Est   Nitrite   Protein   CREAT   Urine HCG        04/07/23 1227 Yellow   Cloudy   Moderate (2+)   Negative   Negative   30 mg/dL (1+)                 Microbiology Results Abnormal     Procedure Component Value - Date/Time    Cryptococcal AG, CSF - Cerebrospinal Fluid, Lumbar Puncture [[id number]]  (Normal) Collected: 04/07/23 1304    Lab Status: Final result Specimen: Cerebrospinal Fluid from Lumbar Puncture Updated: 04/09/23 0734     Cryptococcal Antigen, CSF Negative    Culture, CSF - Cerebrospinal Fluid, Lumbar Puncture [[id number]]  Collected: 04/07/23 1304    Lab Status: Preliminary result Specimen: Cerebrospinal Fluid from Lumbar Puncture Updated: 04/09/23 0718     CSF Culture No growth at 2 days     Gram Stain No WBCs or organisms seen    Blood Culture - Blood, Hand, Right [[id number]]  (Normal) Collected: 04/07/23 1243    Lab Status: Preliminary result Specimen: Blood from Hand, Right Updated: 04/08/23 1301     Blood Culture No growth at 24 hours    Blood Culture - Blood, Hand, Left [[id number]]  (Normal) Collected: 04/07/23 1243    Lab Status: Preliminary result Specimen: Blood from Hand, Left Updated: 04/08/23 1301     Blood Culture No growth at 24 hours    Meningitis / Encephalitis Panel, PCR - Cerebrospinal Fluid, Lumbar Puncture [[id number]]  (Normal) Collected: 04/07/23 1418    Lab Status: Final result Specimen: Cerebrospinal Fluid from Lumbar Puncture Updated: 04/07/23 1553     ESCHERICHIA COLI K1, PCR Not Detected     HAEMOPHILUS INFLUENZAE, PCR Not Detected     LISTERIA MONOCYTOGENES, PCR Not Detected     NEISSERIA MENINGITIDIS, PCR Not Detected     STREPTOCOCCUS AGALACTIAE, PCR Not Detected     STREPTOCOCCUS PNEUMONIAE, PCR Not Detected     CYTOMEGALOVIRUS (CMV), PCR Not Detected     ENTEROVIRUS, PCR Not Detected     HERPES SIMPLEX VIRUS 1 (HSV-1), PCR Not Detected     HERPES SIMPLEX VIRUS 2 (HSV-2), PCR Not Detected     HUMAN PARECHOVIRUS, PCR Not Detected     VARICELLA ZOSTER VIRUS (VZV), PCR Not Detected     CRYPTOCOCCUS NEOFORMANS / GATTII, PCR Not Detected     HUMAN HERPES VIRUS 6 PCR Not Detected    Respiratory Panel PCR w/COVID-19(SARS-CoV-2) JESSY/TAMMY/ULISES/PAD/COR/MAD/WALESKA In-House, NP Swab in UTM/VTM, 3-4 HR TAT - Swab, Nasopharynx [[id number]]  (Normal) Collected: 04/07/23 1227    Lab Status: Final result Specimen: Swab from Nasopharynx Updated: 04/07/23 1349     ADENOVIRUS, PCR Not Detected     Coronavirus 229E Not Detected     Coronavirus HKU1 Not Detected     Coronavirus NL63 Not Detected     Coronavirus OC43 Not Detected      COVID19 Not Detected     Human Metapneumovirus Not Detected     Human Rhinovirus/Enterovirus Not Detected     Influenza A PCR Not Detected     Influenza B PCR Not Detected     Parainfluenza Virus 1 Not Detected     Parainfluenza Virus 2 Not Detected     Parainfluenza Virus 3 Not Detected     Parainfluenza Virus 4 Not Detected     RSV, PCR Not Detected     Bordetella pertussis pcr Not Detected     Bordetella parapertussis PCR Not Detected     Chlamydophila pneumoniae PCR Not Detected     Mycoplasma pneumo by PCR Not Detected    Narrative:      In the setting of a positive respiratory panel with a viral infection PLUS a negative procalcitonin without other underlying concern for bacterial infection, consider observing off antibiotics or discontinuation of antibiotics and continue supportive care. If the respiratory panel is positive for atypical bacterial infection (Bordetella pertussis, Chlamydophila pneumoniae, or Mycoplasma pneumoniae), consider antibiotic de-escalation to target atypical bacterial infection.          EEG    Result Date: 4/7/2023  Reason for referral: 47 y.o.male with seizure, altered mental status Technical Summary:  A 19 channel digital EEG was performed using the international 10-20 placement system, including eye leads and EKG leads. Duration: 22 minutes Findings: The patient is awake.  The background shows diffuse medium amplitude 4-6 Hz intermixed delta and theta activity which is present symmetrically over both hemispheres.  A clear posterior rhythm is not seen.  EMG artifact is variably prominent anteriorly.  Photic stimulation does not change the background.  Hyperventilation is not performed.  Sleep is seen with slowing of the tracing and vertex waves and sleep spindles.  No focal features are seen.  No epileptiform activity is present.  No ongoing seizures are seen. Video: Available Technical quality: Superior EKG: Regular, 70 beats per SUMMARY: Mild-moderate generalized slow No focal  features or epileptiform activity are seen     Impression: Diffuse cerebral dysfunction of mild degree but nonspecific No ongoing seizures are seen This report is transcribed using the Dragon dictation system.      CT Abdomen Pelvis With & Without Contrast    Result Date: 4/8/2023  CT CHEST W WO CONTRAST DIAGNOSTIC, CT ABDOMEN PELVIS W WO CONTRAST Date of Exam: 4/8/2023 7:08 PM EDT Indication: Brain mass, evaluate for malignancy, metastatic workup Comparison: CT head without contrast, MRI brain 4/7/2023 Technique: Axial CT images were obtained of the chest , abdomen, and pelvis before and after the uneventful intravenous administration of 100 mL Isovue 300.  Reconstructed coronal and sagittal images were also obtained. Automated exposure control and iterative construction methods were used. Findings: Chest: Thoracic aortic arch branch vasculature is patent. Heart size is normal. Negative for pericardial effusion. No pulmonary embolus. Negative for mediastinal, hilar, or axillary adenopathy. Bilateral gynecomastia noted. Trachea and mainstem bronchi are patent. Lungs without consolidation. No pneumothorax. Mild linear atelectasis at the left lower lobe. No suspicious pulmonary nodule or mass. Thoracic vertebral bodies are maintained in height. Negative for aggressive osseous lesion or fracture. Mild wall thickening of distal esophagus which may relate to reflux or esophagitis. Abdomen and pelvis: Initial noncontrast imaging demonstrates no radiodense renal calculus. No ureteral calculus. Subsequently post contrast images were obtained. Post surgical changes of prior hepatic transplant. No liver lesion. The spleen is normal in size. Adrenal glands  are normal. Pancreas without evidence of pancreatitis. Is no pancreatic mass. Gallbladder absent. No biliary dilatation. Kidneys are symmetrically enhancing. There is no enhancing or suspicious renal mass. No hydronephrosis or hydroureter. Urinary bladder is thin-walled.  Prostate normal in size. Delayed images demonstrate normal contrast excretion into the nondilated ureters. Negative for pneumoperitoneum. No bowel obstruction. No fluid collection. The appendix is normal. Fluid-filled distention of the stomach. The portal vein and hepatic veins are patent. The splenic vein and superior mesenteric vein are patent. There are prominent venous collaterals in the left upper quadrant extending into the left mid and lower abdomen adjacent to the infrarenal aorta which may relate to sequela of prior portal hypertension given history of liver transplant. Negative for central mesenteric or retroperitoneal adenopathy. No aggressive osseous lesion or acute fracture. Disc narrowing lower lumbar spine at L5-S1.     Impression: Impression: 1. No findings of primary malignancy or metastatic adenopathy in the chest, abdomen, or pelvis. 2. Post surgical changes of liver transplant. 3. Wall thickening at mid and distal esophagus, correlate for findings of reflux/esophagitis. 4. Additional incidental chronic findings above. Electronically Signed: Juan Chandler  4/8/2023 8:52 PM EDT  Workstation ID: [id number]    CT Head Without Contrast    Result Date: 4/7/2023  CT HEAD WO CONTRAST Date of Exam: 4/7/2023 11:50 AM EDT Indication: new onset sz. Comparison: None available. Technique: Axial CT images were obtained of the head without contrast administration.  Reconstructed coronal and sagittal images were also obtained. Automated exposure control and iterative construction methods were used. Findings: Faint hypodensity is seen within the left basal ganglia, likely either chronic lacunar infarct or prominent perivascular space. There is otherwise no evidence of intracranial hemorrhage, mass or mass effect. The ventricles are normal in size and configuration. The orbits are normal. The paranasal sinuses are grossly clear. The calvarium is intact.     Impression: Impression: Faint hypodensity is seen within the  left basal ganglia, likely either chronic lacunar infarct or prominent perivascular space. No acute intracranial abnormality otherwise. Electronically Signed: Christiano Nino  4/7/2023 12:15 PM EDT  Workstation ID: [id number]    CT Chest With & Without Contrast Diagnostic    Result Date: 4/8/2023  CT CHEST W WO CONTRAST DIAGNOSTIC, CT ABDOMEN PELVIS W WO CONTRAST Date of Exam: 4/8/2023 7:08 PM EDT Indication: Brain mass, evaluate for malignancy, metastatic workup Comparison: CT head without contrast, MRI brain 4/7/2023 Technique: Axial CT images were obtained of the chest , abdomen, and pelvis before and after the uneventful intravenous administration of 100 mL Isovue 300.  Reconstructed coronal and sagittal images were also obtained. Automated exposure control and iterative construction methods were used. Findings: Chest: Thoracic aortic arch branch vasculature is patent. Heart size is normal. Negative for pericardial effusion. No pulmonary embolus. Negative for mediastinal, hilar, or axillary adenopathy. Bilateral gynecomastia noted. Trachea and mainstem bronchi are patent. Lungs without consolidation. No pneumothorax. Mild linear atelectasis at the left lower lobe. No suspicious pulmonary nodule or mass. Thoracic vertebral bodies are maintained in height. Negative for aggressive osseous lesion or fracture. Mild wall thickening of distal esophagus which may relate to reflux or esophagitis. Abdomen and pelvis: Initial noncontrast imaging demonstrates no radiodense renal calculus. No ureteral calculus. Subsequently post contrast images were obtained. Post surgical changes of prior hepatic transplant. No liver lesion. The spleen is normal in size. Adrenal glands  are normal. Pancreas without evidence of pancreatitis. Is no pancreatic mass. Gallbladder absent. No biliary dilatation. Kidneys are symmetrically enhancing. There is no enhancing or suspicious renal mass. No hydronephrosis or hydroureter. Urinary bladder is  thin-walled. Prostate normal in size. Delayed images demonstrate normal contrast excretion into the nondilated ureters. Negative for pneumoperitoneum. No bowel obstruction. No fluid collection. The appendix is normal. Fluid-filled distention of the stomach. The portal vein and hepatic veins are patent. The splenic vein and superior mesenteric vein are patent. There are prominent venous collaterals in the left upper quadrant extending into the left mid and lower abdomen adjacent to the infrarenal aorta which may relate to sequela of prior portal hypertension given history of liver transplant. Negative for central mesenteric or retroperitoneal adenopathy. No aggressive osseous lesion or acute fracture. Disc narrowing lower lumbar spine at L5-S1.     Impression: Impression: 1. No findings of primary malignancy or metastatic adenopathy in the chest, abdomen, or pelvis. 2. Post surgical changes of liver transplant. 3. Wall thickening at mid and distal esophagus, correlate for findings of reflux/esophagitis. 4. Additional incidental chronic findings above. Electronically Signed: Juan Chandler  4/8/2023 8:52 PM EDT  Workstation ID: [id number]    MRI Brain With & Without Contrast    Result Date: 4/7/2023  MRI BRAIN W WO CONTRAST Date of Exam: 4/7/2023 3:30 PM EDT Indication: new onset sz protocol.  Comparison: CT earlier the same day Technique:  Routine multiplanar/multisequence sequence images of the brain were obtained before and after the uneventful administration of  20 mL Multihance. Findings: Corresponding to the area of apparent low density within the left basal ganglia seen on same-day CT, there is a somewhat irregular, partially cystic and avidly enhancing mass measuring 2.2 x 2.0 cm. There is either a separate nodule or small satellite nodule present posteriorly measuring 8 mm, also enhancing. Moderate surrounding edema is present on FLAIR sequences, extending to involve the left medial temporal lobe. No additional  mass or abnormal enhancement is present. The ventricles are normal in size and configuration. No acute infarct is present on diffusion weighted sequences. The orbits are normal. The paranasal sinuses are grossly clear.     Impression: Impression: 2.2 cm enhancing and partially cystic mass of the left basal ganglia as above, with surrounding edema extending towards the insular and left temporal regions, favoring primary glial neoplasm over metastasis. Recommend neurosurgical consultation. Electronically Signed: Christiano Nino  4/7/2023 4:32 PM EDT  Workstation ID: [id number]    XR Chest 1 View    Result Date: 4/7/2023  XR CHEST 1 VW Date of Exam: 4/7/2023 10:53 AM EDT Indication: Weak/Dizzy/AMS triage protocol. Comparison: None available. FINDINGS: No focal airspace opacity. No pleural effusion or pneumothorax. Normal heart and mediastinal contours.     Impression: No evidence of acute disease in the chest. Electronically Signed: Christiano Nino  4/7/2023 11:31 AM EDT  Workstation ID: [id number]          Current medications:  Scheduled Meds:levETIRAcetam, 1,000 mg, Oral, Q12H  sodium chloride, 10 mL, Intravenous, Q12H  tacrolimus, 1.5 mg, Oral, Q12H      Continuous Infusions:   PRN Meds:.•  acetaminophen  •  Calcium Replacement - Follow Nurse / BPA Driven Protocol  •  LORazepam  •  LORazepam  •  Magnesium Standard Dose Replacement - Follow Nurse / BPA Driven Protocol  •  ondansetron  •  Phosphorus Replacement - Follow Nurse / BPA Driven Protocol  •  Potassium Replacement - Follow Nurse / BPA Driven Protocol  •  sodium chloride  •  sodium chloride  •  sodium chloride    Assessment & Plan   Assessment & Plan     Active Hospital Problems    Diagnosis  POA   • **Seizures [R56.9]  Yes   • Liver transplanted [Z94.4]  Not Applicable   • Immunosuppressed status [D84.9]  Yes      Resolved Hospital Problems   No resolved problems to display.        Brief Hospital Course to date:  Cheo Garrett is a 47 y.o. male with PMHx significant  for liver cancer s/p Liver transplant at [facility] () on chronic immunosuppressant therapy who presented today via EMS after wife found him having a seizure at home this morning.     Brain Mass  New Onset Seizures  Confusion  - MRI brain with 2.2 cm enhancing and partially cystic mass of the left basal ganglia with surrounding edema extending towards the insular and left temporal regions, favoring primary glial neoplasm   - d/w Dr. Garcia yesterday, continue keppra BID  - appreciate Neurosurgery input, recommend biopsy, tentatively planned for Tuesday  - CT chest/abd/pelvis with no findings concerning for primary malignancy  - ID consult per NSGY recommendations, infectious work-up ongoing  - seizure precautions, ativan PRN  - SLP evaluation, PT/OT following     Lactic Acidosis:  - likely secondary to seizures, now resolved     Hx of Liver Transplant   Hx of Liver Cancer (data deficient)  Immunosuppressive Therapy  - stable and follows with [facility] yearly  - Tacrolimus level pending  - liver enzymes within normal limits     Leukocytosis - resolved  - UA negative, CXR negative, CSF studies negative  - possibly reactive in setting of no fever, normal procal  - blood cultures are negative    Expected Discharge Location and Transportation: Home  Expected Discharge   3-4 days    DVT prophylaxis:  Mechanical DVT prophylaxis orders are present.     AM-PAC 6 Clicks Score (PT): 20 (23 0800)    CODE STATUS:   Code Status and Medical Interventions:   Ordered at: 23 1354     Level Of Support Discussed With:    Patient     Code Status (Patient has no pulse and is not breathing):    CPR (Attempt to Resuscitate)     Medical Interventions (Patient has pulse or is breathing):    Full Support       Roxanne Iverson DO  23        Electronically signed by Roxanne Iverson DO at 23 1039     Alyssa Galloway MD at 23 1743          DOS: 2023  NAME: Cheo Garrett   : 1975  PCP:  "Provider, No Known  Chief Complaint   Patient presents with   • Altered Mental Status       Chief complaint: He is currently lot more clear and able to talk well.  Subjective: Denies any headaches and he is able to follow one-step two-step and three-step commands without any problems.  He was fit enough to jump out of the bed but he is slightly off balance when I was doing the Romberg testing.  He was able to ambulate the entire room without any issues.    Objective:  Vital signs: /91 (BP Location: Left arm, Patient Position: Sitting)   Pulse 91   Temp 97.6 °F (36.4 °C) (Oral)   Resp 18   Ht 193 cm (75.98\")   Wt 109 kg (239 lb 12.8 oz)   SpO2 93%   BMI 29.20 kg/m²    Gen: NAD, vitals reviewed  MS: Normal.  CN: Cranials 2-12: No focal deficits noted.  Motor: Muscles of both upper and lower extremities show good bulk power and tone.  Sensory: No sensory loss noted.  DTRs: 2+ bilaterally with downgoing toes.  Coordination: Normal finger-to-nose coordination noted.  Gait: He was able to get out of the bed and ambulate independently without problems.  The Romberg was positive and he was tending to fall to the left backwards.    ROS:  General: Pleasant gentleman currently feels better.  Neurological: Positive Romberg noted but otherwise no focal deficits noted.    Laboratory results:  Lab Results   Component Value Date    GLUCOSE 153 (H) 04/08/2023    CALCIUM 9.4 04/08/2023     04/08/2023    K 4.0 04/08/2023    CO2 23.0 04/08/2023     04/08/2023    BUN 13 04/08/2023    CREATININE 0.82 04/08/2023    BCR 15.9 04/08/2023    ANIONGAP 12.0 04/08/2023     Lab Results   Component Value Date    WBC 9.94 04/08/2023    HGB 17.2 04/08/2023    HCT 47.4 04/08/2023    MCV 85.3 04/08/2023     04/08/2023            Review of labs: The fasting glucose was elevated 153 because of the effects of dexamethasone.  The CSF studies were as follows:     Latest Reference Range & Units Most Recent   Supernatant " Color, CSF Colorless  Colorless  4/7/23 13:04   Appearance, CSF Clear  Clear  4/7/23 13:04   Color, CSF Colorless  Colorless  4/7/23 13:04   Tube Number, CSF  1  4/7/23 13:04   Volume, CSF mL 8.0  4/7/23 13:04   WBC, CSF 0 - 5 /mm3 0  4/7/23 13:04   RBC, CSF 0 - 5 /mm3 18 (H)  4/7/23 13:04   Glucose, CSF 40 - 70 mg/dL 88 (H)  4/7/23 13:04   Protein, Total (CSF) 15.0 - 45.0 mg/dL 30.7  4/7/23 13:04   (H): Data is abnormally high  ESCHERICHIA COLI K1, PCR  Not Detected Not Detected    HAEMOPHILUS INFLUENZAE, PCR  Not Detected Not Detected    LISTERIA MONOCYTOGENES, PCR  Not Detected Not Detected    NEISSERIA MENINGITIDIS, PCR  Not Detected Not Detected    STREPTOCOCCUS AGALACTIAE, PCR  Not Detected Not Detected    STREPTOCOCCUS PNEUMONIAE, PCR  Not Detected Not Detected    CYTOMEGALOVIRUS (CMV), PCR  Not Detected Not Detected    ENTEROVIRUS, PCR  Not Detected Not Detected    HERPES SIMPLEX VIRUS 1 (HSV-1), PCR  Not Detected Not Detected    HERPES SIMPLEX VIRUS 2 (HSV-2), PCR  Not Detected Not Detected    HUMAN PARECHOVIRUS, PCR  Not Detected Not Detected    VARICELLA ZOSTER VIRUS (VZV), PCR  Not Detected Not Detected    CRYPTOCOCCUS NEOFORMANS / GATTII, PCR  Not Detected Not Detected    HUMAN HERPES VIRUS 6 PCR  Not Detected Not Detected        Review and interpretation of imaging: The MRI of the brain with and without contrast performed on April 7, 2023 showed the following:    Findings:   Corresponding to the area of apparent low density within the left basal ganglia seen on same-day CT, there is a somewhat irregular, partially cystic and avidly enhancing mass measuring 2.2 x 2.0 cm. There is either a separate nodule or small satellite   nodule present posteriorly measuring 8 mm, also enhancing. Moderate surrounding edema is present on FLAIR sequences, extending to involve the left medial temporal lobe. No additional mass or abnormal enhancement is present. The ventricles are normal in   size and configuration. No  acute infarct is present on diffusion weighted sequences. The orbits are normal. The paranasal sinuses are grossly clear.     IMPRESSION:  Impression:   2.2 cm enhancing and partially cystic mass of the left basal ganglia as above, with surrounding edema extending towards the insular and left temporal regions, favoring primary glial neoplasm over metastasis. Recommend neurosurgical consultation    Workup to date: The EEG performed showed the following:    Findings:     The patient is awake.  The background shows diffuse medium amplitude 4-6 Hz intermixed delta and theta activity which is present symmetrically over both hemispheres.  A clear posterior rhythm is not seen.  EMG artifact is variably prominent anteriorly.  Photic stimulation does not change the background.  Hyperventilation is not performed.  Sleep is seen with slowing of the tracing and vertex waves and sleep spindles.  No focal features are seen.  No epileptiform activity is present.  No ongoing seizures are seen.     Video: Available     Technical quality: Superior     EKG: Regular, 70 beats per     SUMMARY:     Mild-moderate generalized slow     No focal features or epileptiform activity are seen     IMPRESSION:     Diffuse cerebral dysfunction of mild degree but nonspecific     No ongoing seizures are seen    Diagnoses: Patient with most probably a malignant lesion in the left basal ganglia protruding into the left temporoparietal area of the brain which could have been the seizure focus.      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    Plan:  1.  CT scan of the chest abdomen pelvis is currently pending to look for any underlying malignancy or a nidus of infection.  2.  Infectious disease has been consulted for the time being.  3.  Patient has been set up for a biopsy for coming Tuesday.  4.  Continue the Keppra at 1000 mg twice daily with food.  5.  He has been instructed not to drive or  use any hazardous equipment for 6 months as per the state [facility].    Discussed with with the patient and his wife and will follow-up.    Spent a total of 30 minutes in face-to-face evaluation and management of the patient.    Electronically signed by Alyssa Galloway MD, 23, 5:43 PM EDT.      Electronically signed by Alyssa Galloway MD at 23 1751     Roxanne Iverson DO at 23 1153              [facility] Medicine Services  PROGRESS NOTE    Patient Name: Cheo Garrett  : 1975  MRN: [id number]    Date of Admission: 2023  Primary Care Physician: Provider, No Known    Subjective   Subjective     CC:  F/u seizures    HPI:  Patient resting in bedside chair, doing okay, still with some forgetful moments, no further seizures thus far. Wife supportive and at bedside. We discussed the results of the scan.    ROS:  Gen- No fevers, chills  CV- No chest pain, palpitations  Resp- No cough, dyspnea  GI- No N/V/D, abd pain    Objective   Objective     Vital Signs:   Temp:  [97.7 °F (36.5 °C)-98.4 °F (36.9 °C)] 97.7 °F (36.5 °C)  Heart Rate:  [67-97] 83  Resp:  [18] 18  BP: (126-142)/(73-92) 134/91     Physical Exam:  Constitutional: No acute distress, awake, alert  HENT: NCAT, mucous membranes moist  Respiratory: Clear to auscultation bilaterally, respiratory effort normal   Cardiovascular: RRR, no murmurs, rubs, or gallops  Gastrointestinal: Positive bowel sounds, soft, nontender, nondistended  Musculoskeletal: No bilateral ankle edema  Psychiatric: Appropriate affect, cooperative  Neurologic: Oriented x3, speech clear, no focal deficits currently  Skin: No rashes      Results Reviewed:  LAB RESULTS:      Lab 23  0538 23  1826 23  1243 23  1053   WBC 9.94  --   --  18.22*   HEMOGLOBIN 17.2  --   --  16.6   HEMATOCRIT 47.4  --   --  46.4   PLATELETS 168  --   --  161   NEUTROS ABS 8.20*  --   --  15.73*   IMMATURE GRANS (ABS) 0.04  --   --   0.10*   LYMPHS ABS 1.30  --   --  0.55*   MONOS ABS 0.38  --   --  1.79*   EOS ABS 0.00  --   --  0.01   MCV 85.3  --   --  86.7   PROCALCITONIN  --   --   --  0.03   LACTATE  --  1.7 4.0* 6.4*   PROTIME  --   --   --  14.0         Lab 04/08/23  0538 04/07/23  1053   SODIUM 140 140   POTASSIUM 4.0 3.1*   CHLORIDE 105 104   CO2 23.0 19.0*   ANION GAP 12.0 17.0*   BUN 13 12   CREATININE 0.82 1.08   EGFR 110.4 86.2   GLUCOSE 153* 151*   CALCIUM 9.4 8.8   MAGNESIUM  --  1.9   TSH  --  1.350         Lab 04/07/23  1053   TOTAL PROTEIN 6.5   ALBUMIN 4.2   GLOBULIN 2.3   ALT (SGPT) 27   AST (SGOT) 24   BILIRUBIN 0.5   ALK PHOS 48         Lab 04/07/23  1053   HSTROP T 9   PROTIME 14.0   INR 1.08                 Brief Urine Lab Results  (Last result in the past 365 days)      Color   Clarity   Blood   Leuk Est   Nitrite   Protein   CREAT   Urine HCG        04/07/23 1227 Yellow   Cloudy   Moderate (2+)   Negative   Negative   30 mg/dL (1+)                 Microbiology Results Abnormal     Procedure Component Value - Date/Time    Culture, CSF - Cerebrospinal Fluid, Lumbar Puncture [[id number]] Collected: 04/07/23 1304    Lab Status: Preliminary result Specimen: Cerebrospinal Fluid from Lumbar Puncture Updated: 04/08/23 0703     CSF Culture No growth     Gram Stain No WBCs or organisms seen    Meningitis / Encephalitis Panel, PCR - Cerebrospinal Fluid, Lumbar Puncture [[id number]]  (Normal) Collected: 04/07/23 1418    Lab Status: Final result Specimen: Cerebrospinal Fluid from Lumbar Puncture Updated: 04/07/23 1553     ESCHERICHIA COLI K1, PCR Not Detected     HAEMOPHILUS INFLUENZAE, PCR Not Detected     LISTERIA MONOCYTOGENES, PCR Not Detected     NEISSERIA MENINGITIDIS, PCR Not Detected     STREPTOCOCCUS AGALACTIAE, PCR Not Detected     STREPTOCOCCUS PNEUMONIAE, PCR Not Detected     CYTOMEGALOVIRUS (CMV), PCR Not Detected     ENTEROVIRUS, PCR Not Detected     HERPES SIMPLEX VIRUS 1 (HSV-1), PCR Not Detected     HERPES SIMPLEX  VIRUS 2 (HSV-2), PCR Not Detected     HUMAN PARECHOVIRUS, PCR Not Detected     VARICELLA ZOSTER VIRUS (VZV), PCR Not Detected     CRYPTOCOCCUS NEOFORMANS / GATTII, PCR Not Detected     HUMAN HERPES VIRUS 6 PCR Not Detected    Respiratory Panel PCR w/COVID-19(SARS-CoV-2) JESSY/TAMMY/ULISES/PAD/COR/MAD/WALESKA In-House, NP Swab in UTM/VTM, 3-4 HR TAT - Swab, Nasopharynx [[id number]]  (Normal) Collected: 04/07/23 1227    Lab Status: Final result Specimen: Swab from Nasopharynx Updated: 04/07/23 1349     ADENOVIRUS, PCR Not Detected     Coronavirus 229E Not Detected     Coronavirus HKU1 Not Detected     Coronavirus NL63 Not Detected     Coronavirus OC43 Not Detected     COVID19 Not Detected     Human Metapneumovirus Not Detected     Human Rhinovirus/Enterovirus Not Detected     Influenza A PCR Not Detected     Influenza B PCR Not Detected     Parainfluenza Virus 1 Not Detected     Parainfluenza Virus 2 Not Detected     Parainfluenza Virus 3 Not Detected     Parainfluenza Virus 4 Not Detected     RSV, PCR Not Detected     Bordetella pertussis pcr Not Detected     Bordetella parapertussis PCR Not Detected     Chlamydophila pneumoniae PCR Not Detected     Mycoplasma pneumo by PCR Not Detected    Narrative:      In the setting of a positive respiratory panel with a viral infection PLUS a negative procalcitonin without other underlying concern for bacterial infection, consider observing off antibiotics or discontinuation of antibiotics and continue supportive care. If the respiratory panel is positive for atypical bacterial infection (Bordetella pertussis, Chlamydophila pneumoniae, or Mycoplasma pneumoniae), consider antibiotic de-escalation to target atypical bacterial infection.          EEG    Result Date: 4/7/2023  Reason for referral: 47 y.o.male with seizure, altered mental status Technical Summary:  A 19 channel digital EEG was performed using the international 10-20 placement system, including eye leads and EKG leads. Duration:  22 minutes Findings: The patient is awake.  The background shows diffuse medium amplitude 4-6 Hz intermixed delta and theta activity which is present symmetrically over both hemispheres.  A clear posterior rhythm is not seen.  EMG artifact is variably prominent anteriorly.  Photic stimulation does not change the background.  Hyperventilation is not performed.  Sleep is seen with slowing of the tracing and vertex waves and sleep spindles.  No focal features are seen.  No epileptiform activity is present.  No ongoing seizures are seen. Video: Available Technical quality: Superior EKG: Regular, 70 beats per SUMMARY: Mild-moderate generalized slow No focal features or epileptiform activity are seen     Impression: Diffuse cerebral dysfunction of mild degree but nonspecific No ongoing seizures are seen This report is transcribed using the Dragon dictation system.      CT Head Without Contrast    Result Date: 4/7/2023  CT HEAD WO CONTRAST Date of Exam: 4/7/2023 11:50 AM EDT Indication: new onset sz. Comparison: None available. Technique: Axial CT images were obtained of the head without contrast administration.  Reconstructed coronal and sagittal images were also obtained. Automated exposure control and iterative construction methods were used. Findings: Faint hypodensity is seen within the left basal ganglia, likely either chronic lacunar infarct or prominent perivascular space. There is otherwise no evidence of intracranial hemorrhage, mass or mass effect. The ventricles are normal in size and configuration. The orbits are normal. The paranasal sinuses are grossly clear. The calvarium is intact.     Impression: Impression: Faint hypodensity is seen within the left basal ganglia, likely either chronic lacunar infarct or prominent perivascular space. No acute intracranial abnormality otherwise. Electronically Signed: Christiano Nino  4/7/2023 12:15 PM EDT  Workstation ID: [id number]    MRI Brain With & Without  Contrast    Result Date: 4/7/2023  MRI BRAIN W WO CONTRAST Date of Exam: 4/7/2023 3:30 PM EDT Indication: new onset sz protocol.  Comparison: CT earlier the same day Technique:  Routine multiplanar/multisequence sequence images of the brain were obtained before and after the uneventful administration of  20 mL Multihance. Findings: Corresponding to the area of apparent low density within the left basal ganglia seen on same-day CT, there is a somewhat irregular, partially cystic and avidly enhancing mass measuring 2.2 x 2.0 cm. There is either a separate nodule or small satellite nodule present posteriorly measuring 8 mm, also enhancing. Moderate surrounding edema is present on FLAIR sequences, extending to involve the left medial temporal lobe. No additional mass or abnormal enhancement is present. The ventricles are normal in size and configuration. No acute infarct is present on diffusion weighted sequences. The orbits are normal. The paranasal sinuses are grossly clear.     Impression: Impression: 2.2 cm enhancing and partially cystic mass of the left basal ganglia as above, with surrounding edema extending towards the insular and left temporal regions, favoring primary glial neoplasm over metastasis. Recommend neurosurgical consultation. Electronically Signed: Christiano Nino  4/7/2023 4:32 PM EDT  Workstation ID: [id number]    XR Chest 1 View    Result Date: 4/7/2023  XR CHEST 1 VW Date of Exam: 4/7/2023 10:53 AM EDT Indication: Weak/Dizzy/AMS triage protocol. Comparison: None available. FINDINGS: No focal airspace opacity. No pleural effusion or pneumothorax. Normal heart and mediastinal contours.     Impression: No evidence of acute disease in the chest. Electronically Signed: Christiano Nino  4/7/2023 11:31 AM EDT  Workstation ID: [id number]          Current medications:  Scheduled Meds:levETIRAcetam, 1,000 mg, Oral, Q12H  sodium chloride, 10 mL, Intravenous, Q12H  tacrolimus, 1.5 mg, Oral, Q12H      Continuous  Infusions:   PRN Meds:.•  acetaminophen  •  Calcium Replacement - Follow Nurse / BPA Driven Protocol  •  LORazepam  •  LORazepam  •  Magnesium Standard Dose Replacement - Follow Nurse / BPA Driven Protocol  •  ondansetron  •  Phosphorus Replacement - Follow Nurse / BPA Driven Protocol  •  Potassium Replacement - Follow Nurse / BPA Driven Protocol  •  sodium chloride  •  sodium chloride  •  sodium chloride    Assessment & Plan   Assessment & Plan     Active Hospital Problems    Diagnosis  POA   • **Seizures [R56.9]  Yes   • Liver transplanted [Z94.4]  Not Applicable   • Immunosuppressed status [D84.9]  Yes      Resolved Hospital Problems   No resolved problems to display.        Brief Hospital Course to date:  Cheo Garrett is a 47 y.o. male with PMHx significant for liver cancer s/p Liver transplant at [facility] (2013) on chronic immunosuppressant therapy who presented today via EMS after wife found him having a seizure at home this morning.     Brain Mass  New Onset Seizures  Confusion  - MRI brain with 2.2 cm enhancing and partially cystic mass of the left basal ganglia with surrounding edema extending towards the insular and left temporal regions, favoring primary glial neoplasm   - d/w Dr. Garcia yesterday, continue kera BID  - appreciate Neurosurgery input, recommend biopsy. Wife/patient not from [place] and may desire to pursue any type of operative intervention closer to home. Okay with discontinuing steroids  - CT chest/abd/pelvis  - ID consult per NSGY recommendations to rule out infectious etiology, although CSF work-up thus far appear benign  - seizure precautions, ativan PRN  - SLP evaluation, PT/OT     Lactic Acidosis:  - likely secondary to seizures, now resolved     Hx of Liver Transplant   Hx of Liver Cancer (data deficient)  Immunosuppressive Therapy  - stable and follows with [facility] yearly  - Tacrolimus level pending  - liver enzymes within normal limits     Leukocytosis - resolved  - UA  negative, CXR negative, CSF studies negative  - presume reactive in setting of no fever, normal procal  - blood cultures pending    Expected Discharge Location and Transportation: Home  Expected Discharge   pending further work-up     DVT prophylaxis:  Mechanical DVT prophylaxis orders are present.     AM-PAC 6 Clicks Score (PT): 20 (23 0730)    CODE STATUS:   Code Status and Medical Interventions:   Ordered at: 23 1354     Level Of Support Discussed With:    Patient     Code Status (Patient has no pulse and is not breathing):    CPR (Attempt to Resuscitate)     Medical Interventions (Patient has pulse or is breathing):    Full Support       Roxanne Iverson DO  23        Electronically signed by Roxanne Iverson DO at 23 1158          Discharge Summary      Donis Briseno MD at 23 7931          DISCHARGE SUMMARY    Patient Name: Cheo Garrett  : 1975  MRN: [id number]    Date of Admission: 2023 10:43 AM  Date of Discharge: 2023  6:29 PM   Primary Care Physician: Provider, No Known    Reason for hospitalization     HPI:  Cheo Garrett is a 47 y.o. male was admitted on 2023 with the initial diagnosis of Seizure [R56.9]  Seizures [R56.9].    PMHx significant for liver cancer s/p Liver transplant at [facility] () on chronic immunosuppressant therapy who presented today via EMS after wife found him having a seizure at home this morning. Patient had been having some ongoing confusion that started on Monday of this week and has progressively worsened. He denied fever/chills. No headache. He had no prior hx of seizures. He takes no other medications besides his Tacrolimus. He was confused and thought he was at [facility]. He did not remember the events from that morning.     Significant findings.  Discharge diagnosis/problems:      Diagnosis   • **G93.89, Brain Bx 23 [G93.89] - Glioblastoma   • Seizures [R56.9]   • Liver transplanted [Z94.4]   •  Immunosuppressed status [D84.9]      Lab Results   Lab Value Date/Time    INTRAOP  04/11/2023 0917     Frozen section: Verbal report given to Dr. Gibbons in person on 4/11/2023 at 09:42 EDT.    FROZEN SECTION DIAGNOSIS: Malignant neoplasm, favor high-grade glioma (FGG)  Stains used for Immediate Evaluation are acceptable.      FINALDX  04/11/2023 0917     BRAIN, LEFT TEMPORAL LOBE, BIOPSY (1, 2):  Glioblastoma (CNS WHO grade 4), NOS  See comment      COMDX  04/11/2023 0917     Tests for molecular characterization are currently pending, results will be issued in an addendum.         Physical Examination and Data     Vitals:  Temp: 97.2 °F (36.2 °C) (04/12/23 0900) Temp  Min: 97.2 °F (36.2 °C)  Max: 98 °F (36.7 °C)   Temp core:      BP: 131/84 (04/12/23 1700) BP  Min: 115/76  Max: 145/96   Pulse: 105 (04/12/23 1700) Pulse  Min: 63  Max: 107   Resp: 18 (04/12/23 1600) Resp  Min: 16  Max: 18   SpO2: 97 % (04/12/23 1700) SpO2  Min: 92 %  Max: 98 %   Device: room air (04/12/23 1600)    Flow Rate: 2 (04/11/23 1200) No data recorded     Physical Examination  Telemetry:  Rhythm: normal sinus rhythm      Constitutional:  No acute distress.   Cardiovascular: RRR.    Respiratory: Normal breath sounds  No adventitious sounds   Abdominal:  Soft with no tenderness.   Extremities: No Edema   Neurological:             Alert, Oriented, Cooperative.  Best Eye Response: 4-->(E4) spontaneous   Best Motor Response: 6-->(M6) obeys commands   Best Verbal Response: 5-->(V5) oriented   [place] Coma Scale Score: 15       Total (NIH Stroke Scale): 0 (04/11/23 1900)     Results Reviewed:  Laboratory  Microbiology  Radiology  Pathology    Hematology:  Results from last 7 days   Lab Units 04/12/23  0542 04/09/23  0640 04/08/23  0538   WBC 10*3/mm3 12.87* 9.66 9.94   NEUTROS ABS 10*3/mm3 9.48* 5.92 8.20*   IMM GRAN % % 0.4 0.9* 0.4   LYMPHS ABS 10*3/mm3 1.65 2.39 1.30   MONOS ABS 10*3/mm3 1.61* 1.15* 0.38   EOS ABS 10*3/mm3 0.06 0.05 0.00   DURAN  ABS 10*3/mm3 0.02 0.06 0.02   HEMOGLOBIN g/dL 14.9 15.3 17.2   MCV fL 88.5 86.6 85.3   RDW % 12.6 12.1* 11.8*   PLATELETS 10*3/mm3 147 145 168     Chemistry:  Estimated Creatinine Clearance: 180.4 mL/min (A) (by C-G formula based on SCr of 0.7 mg/dL (L)).    Results from last 7 days   Lab Units 04/12/23  0647 04/09/23  0640   SODIUM mmol/L 141 144   POTASSIUM mmol/L 4.1 3.7   CHLORIDE mmol/L 107 107   CO2 mmol/L 27.0 26.0   BUN mg/dL 16 16   CREATININE mg/dL 0.70* 0.80   GLUCOSE mg/dL 132* 97     Results from last 7 days   Lab Units 04/12/23  0647 04/09/23  0640 04/08/23  0538 04/07/23  1053   CALCIUM mg/dL 8.7 9.3   < > 8.8   MAGNESIUM mg/dL  --   --   --  1.9    < > = values in this interval not displayed.     Hepatic Panel:  Results from last 7 days   Lab Units 04/07/23  1053   ALBUMIN g/dL 4.2   BILIRUBIN mg/dL 0.5   AST (SGOT) U/L 24   ALT (SGPT) U/L 27   ALK PHOS U/L 48      Coagulation Labs:  Results from last 7 days   Lab Units 04/07/23  1053   PROTIME Seconds 14.0   INR  1.08      Cardiac Labs:  Results from last 7 days   Lab Units 04/07/23  1053   HSTROP T ng/L 9     Biomarkers:  Results from last 7 days   Lab Units 04/07/23  1826 04/07/23  1243 04/07/23  1053   LACTATE mmol/L 1.7 4.0* 6.4*   PROCALCITONIN ng/mL  --   --  0.03     U/A  Results from last 7 days   Lab Units 04/07/23  1227   COLOR UA  Yellow   CLARITY UA  Cloudy*   PH, URINE  <=5.0   SPECIFIC GRAVITY, URINE  1.018   GLUCOSE UA  Negative   KETONES UA  Trace*   BILIRUBIN UA  Negative   PROTEIN UA  30 mg/dL (1+)*   BLOOD UA  Moderate (2+)*   LEUKOCYTES UA  Negative   NITRITE UA  Negative   UROBILINOGEN UA  1.0 E.U./dL     Results from last 7 days   Lab Units 04/07/23  1227   RBC UA /HPF 0-2   WBC UA /HPF 0-2   BACTERIA UA /HPF None Seen   SQUAM EPITHEL UA /HPF 0-2   HYALINE CASTS UA /LPF 0-6       COVID-19  Lab Results   Component Value Date    COVID19 Not Detected 04/07/2023     Images:  EEG    Result Date: 4/7/2023  Diffuse cerebral  dysfunction of mild degree but nonspecific No ongoing seizures are seen This report is transcribed using the Dragon dictation system.      CT Abdomen Pelvis With & Without Contrast    Result Date: 4/8/2023  Impression: 1. No findings of primary malignancy or metastatic adenopathy in the chest, abdomen, or pelvis. 2. Post surgical changes of liver transplant. 3. Wall thickening at mid and distal esophagus, correlate for findings of reflux/esophagitis. 4. Additional incidental chronic findings above. Electronically Signed: Juan Virginiabaltazar  4/8/2023 8:52 PM EDT  Workstation ID: [id number]    CT Head Without Contrast    Result Date: 4/11/2023  Impression: Postoperative changes of left basal ganglia lesion biopsy with few foci of gas seen at the biopsy site as well as a small amount of pneumocephalus. No evidence of acute intracranial abnormality otherwise. Electronically Signed: Andrew Augustin  4/11/2023 12:54 PM EDT  Workstation ID: [id number]    CT Head Without Contrast    Result Date: 4/7/2023  Impression: Faint hypodensity is seen within the left basal ganglia, likely either chronic lacunar infarct or prominent perivascular space. No acute intracranial abnormality otherwise. Electronically Signed: Christiano Nino  4/7/2023 12:15 PM EDT  Workstation ID: [id number]    CT Chest With & Without Contrast Diagnostic    Result Date: 4/8/2023  Impression: 1. No findings of primary malignancy or metastatic adenopathy in the chest, abdomen, or pelvis. 2. Post surgical changes of liver transplant. 3. Wall thickening at mid and distal esophagus, correlate for findings of reflux/esophagitis. 4. Additional incidental chronic findings above. Electronically Signed: Juan Chandler  4/8/2023 8:52 PM EDT  Workstation ID: [id number]    MRI Brain With & Without Contrast    Result Date: 4/7/2023  Impression: 2.2 cm enhancing and partially cystic mass of the left basal ganglia as above, with surrounding edema extending towards the insular and left  temporal regions, favoring primary glial neoplasm over metastasis. Recommend neurosurgical consultation. Electronically Signed: Christiano Nino  4/7/2023 4:32 PM EDT  Workstation ID: [id number]    XR Chest 1 View    Result Date: 4/7/2023  No evidence of acute disease in the chest. Electronically Signed: Christiano Nino  4/7/2023 11:31 AM EDT  Workstation ID: [id number]    Results: Reviewed.  I reviewed the patient's new laboratory and imaging results.  I independently reviewed the patient's new images.    Medications: Reviewed.    Hospital Course, Consults and Procedures  provided:     He had a CTH that revealed faint hypodensity is seen within the left basal ganglia.  He underwent MRI Brain that revealed 2.2 cm enhancing and partially cystic mass of the left basal ganglia as above, with surrounding edema extending towards the insular and left temporal regions, favoring primary glial neoplasm over metastasis. NS was consulted and he was started on Dexamethasone and PPI.  He was also started on prophylactic Keppra.  Given his immunocompromised state, ID was also consulted for possible infectious process.  He underwent LP.  CT c/a/p were negative for malignancy.  He underwent brain biopsy 4/11/23 by Dr. Gibbons.  Pathology was c/w glioblastoma.  Due to location, tumor was not resectable.  He was seen by Radiation Oncology and Oncologywho recommended chemoradiation.  However, patient lives in [place] and wants to have treatment closer to home at [facility] or a facility in [place] or [place].  He is ready for discharge.  He will be sent home with Keppra and Decadron per NS.      Consults:  Consults     Date and Time Order Name Status Description    4/12/2023 12:10 PM Inpatient Hematology & Oncology Consult Completed     4/12/2023 10:02 AM Inpatient Radiation Oncology Consult Completed     4/8/2023 11:38 AM Inpatient Infectious Diseases Consult Completed     4/7/2023  5:29 PM Inpatient Neurology Consult General  Completed     4/7/2023  5:20 PM Inpatient Neurosurgery Consult Completed         Procedures:  4/7/23 EEG - Diffuse cerebral dysfunction of mild degree but nonspecific.  No ongoing seizures are seen    4/7/23 Lumbar Puncture    4/11/23 left frontal brain biopsy     Condition on discharge     Good. (Vital signs within normal limits. Patient conscious and comfortable)    Patient and family instructions     Discharge Disposition: Home or Self Care    CODE STATUS:   Code Status and Medical Interventions:   Ordered at: 04/11/23 1215     Level Of Support Discussed With:    Patient     Code Status (Patient has no pulse and is not breathing):    CPR (Attempt to Resuscitate)     Medical Interventions (Patient has pulse or is breathing):    Full       No Known Allergies    Discharge Medications:     Discharge Medications      New Medications      Instructions Start Date   levETIRAcetam 1000 MG tablet  Commonly known as: KEPPRA   1,000 mg, Oral, Every 12 Hours Scheduled      Decadron as prescribed by NS     Continue These Medications      Instructions Start Date   multivitamin with minerals tablet tablet   1 tablet, Oral, Daily, OTC      tacrolimus 0.5 MG capsule  Commonly known as: PROGRAF   1.5 mg, Oral, 2 Times Daily         Stop These Medications    aspirin 81 MG EC tablet     ibuprofen 200 MG tablet  Commonly known as: ADVIL,MOTRIN            Activity: As tolerated      Diet: Diet: Regular/House Diet; Texture: Regular Texture (IDDSI 7); Fluid Consistency: Thin (IDDSI 0)        No future appointments.       Pending Labs     Order Current Status    - Miscellaneous Test In process    Anaerobic Culture - Tissue, Brain In process    Fungus Culture - Tissue, Brain In process    Tissue Pathology Exam In process    Toxoplasma Gondii, PCR - Cerebrospinal Fluid, Lumbar Puncture In process    Tissue / Bone Culture - Tissue, Brain Preliminary result        Time Spent on Discharge:    I spent  45  minutes on this discharge activity  which included: face-to-face encounter with the patient, reviewing the data in the system, coordination of the care with the nursing staff as well as consultants, documentation, and entering orders.      Electronically signed by SUMMER Maynard, 04/13/23, 9:50 AM EDT.        Electronically signed by Donis Briseno MD at 04/16/23 [id number]

## 2023-04-18 ENCOUNTER — READMISSION MANAGEMENT (OUTPATIENT)
Dept: CALL CENTER | Facility: HOSPITAL | Age: 48
End: 2023-04-18
Payer: COMMERCIAL

## 2023-04-18 LAB — FUNGUS WND CULT: NORMAL

## 2023-04-18 NOTE — OUTREACH NOTE
General Surgery Week 1 Survey    Flowsheet Row Responses   [facility] patient discharged from? [place]   Does the patient have one of the following disease processes/diagnoses(primary or secondary)? General Surgery   Week 1 attempt successful? Yes   Call start time 1422   Call end time 1429   Discharge diagnosis Brain mass,   Left frontal brain biopsy   Person spoke with today (if not patient) and relationship Noreen Garrett Spouse    Meds reviewed with patient/caregiver? Yes  [New: keppra]   Does the patient have all medications related to this admission filled (includes all antibiotics, pain medications, etc.) Yes   Is the patient taking all medications as directed (includes completed medication regime)? Yes   Does the patient have a follow up appointment scheduled with their surgeon? Yes   Has the patient kept scheduled appointments due by today? Yes   Has home health visited the patient within 72 hours of discharge? N/A   Psychosocial issues? No   Did the patient receive a copy of their discharge instructions? Yes   What is the patient's perception of their health status since discharge? Same  [Wife reports biggest concerns is awaiting final pathology results. ]   Is the patient/caregiver able to teach back steps to recovery at home? Set small, achievable goals for return to baseline health   If the patient is a current smoker, are they able to teach back resources for cessation? Not a smoker   Is the patient/caregiver able to teach back the hierarchy of who to call/visit for symptoms/problems? PCP, Specialist, Home health nurse, Urgent Care, ED, 911 Yes   Week 1 call completed? Yes   Wrap up additional comments 24hr nurseline number given to wife.           ISIDRO LYNCH - Registered Nurse

## 2023-04-19 LAB
CYTO UR: NORMAL
LAB AP CASE REPORT: NORMAL
LAB AP CLINICAL INFORMATION: NORMAL
LAB AP DIAGNOSIS COMMENT: NORMAL
LAB AP SPECIAL STAINS: NORMAL
Lab: NORMAL
PATH REPORT.ADDENDUM SPEC: NORMAL
PATH REPORT.FINAL DX SPEC: NORMAL
PATH REPORT.GROSS SPEC: NORMAL

## 2023-04-25 LAB — FUNGUS WND CULT: NORMAL

## 2023-04-27 ENCOUNTER — READMISSION MANAGEMENT (OUTPATIENT)
Dept: CALL CENTER | Facility: HOSPITAL | Age: 48
End: 2023-04-27
Payer: COMMERCIAL

## 2023-04-27 NOTE — OUTREACH NOTE
General Surgery Week 2 Survey    Flowsheet Row Responses   [facility] facility patient discharged from? [place]   Does the patient have one of the following disease processes/diagnoses(primary or secondary)? General Surgery   Week 2 attempt successful? No   Unsuccessful attempts Attempt 1          Esha APPLE - Registered Nurse

## 2023-05-01 ENCOUNTER — READMISSION MANAGEMENT (OUTPATIENT)
Dept: CALL CENTER | Facility: HOSPITAL | Age: 48
End: 2023-05-01
Payer: COMMERCIAL

## 2023-05-01 ENCOUNTER — TELEPHONE (OUTPATIENT)
Dept: NEUROSURGERY | Facility: CLINIC | Age: 48
End: 2023-05-01
Payer: COMMERCIAL

## 2023-05-01 NOTE — OUTREACH NOTE
General Surgery Week 2 Survey    Flowsheet Row Responses   [facility] patient discharged from? [place]   Does the patient have one of the following disease processes/diagnoses(primary or secondary)? General Surgery   Week 2 attempt successful? Yes   Call start time 1714   Call end time 1717   Discharge diagnosis Brain mass,   Left frontal brain biopsy   Person spoke with today (if not patient) and relationship VelwadeNoreen Spouse    Is the patient taking all medications as directed (includes completed medication regime)? Yes   Does the patient have a follow up appointment scheduled with their surgeon? Yes   Has the patient kept scheduled appointments due by today? Yes   Psychosocial issues? No   What is the patient's perception of their health status since discharge? Improving   Is the patient/caregiver able to teach back the hierarchy of who to call/visit for symptoms/problems? PCP, Specialist, Home health nurse, Urgent Care, ED, 911 Yes   Additional teach back comments States had oncology appt today and she is waiting on a return call from the neuro surgeon.   Week 2 call completed? Yes   Graduated/Revoked comments Denies questions or needs at this time.          Dulce RNEE - Licensed Nurse

## 2023-05-01 NOTE — TELEPHONE ENCOUNTER
Provider:  Shai  Surgery/Procedure:  Craniotomy  Surgery/Procedure Date:  4/11/23  Last visit:   NA  Next visit: NA     Reason for call: Spoke to Noreen, patient's spouse. They would like to know the molecular breakdown of the biopsy results, I've relayed to them it is a glioblastoma CNS WHO GRADE 4, but they were already aware of this.

## 2023-05-02 LAB — FUNGUS WND CULT: NORMAL

## 2023-05-03 LAB
CYTO UR: NORMAL
LAB AP CASE REPORT: NORMAL
LAB AP CLINICAL INFORMATION: NORMAL
LAB AP DIAGNOSIS COMMENT: NORMAL
LAB AP SPECIAL STAINS: NORMAL
Lab: NORMAL
Lab: NORMAL
PATH REPORT.ADDENDUM SPEC: NORMAL
PATH REPORT.FINAL DX SPEC: NORMAL
PATH REPORT.GROSS SPEC: NORMAL

## 2023-05-09 LAB
CYTO UR: NORMAL
FUNGUS WND CULT: NORMAL
LAB AP CASE REPORT: NORMAL
LAB AP CLINICAL INFORMATION: NORMAL
LAB AP DIAGNOSIS COMMENT: NORMAL
LAB AP SPECIAL STAINS: NORMAL
Lab: NORMAL
Lab: NORMAL
PATH REPORT.ADDENDUM SPEC: NORMAL
PATH REPORT.FINAL DX SPEC: NORMAL
PATH REPORT.GROSS SPEC: NORMAL

## 2023-05-16 LAB — FUNGUS WND CULT: NORMAL

## 2023-05-23 LAB
CYTO UR: NORMAL
FUNGUS WND CULT: NORMAL
LAB AP CASE REPORT: NORMAL
LAB AP CLINICAL INFORMATION: NORMAL
LAB AP DIAGNOSIS COMMENT: NORMAL
LAB AP OUTSIDE REPORT, ADDENDUM: NORMAL
LAB AP SPECIAL STAINS: NORMAL
Lab: NORMAL
Lab: NORMAL
PATH REPORT.ADDENDUM SPEC: NORMAL
PATH REPORT.FINAL DX SPEC: NORMAL
PATH REPORT.GROSS SPEC: NORMAL

## 2023-12-19 LAB
CYTO UR: NORMAL
LAB AP CASE REPORT: NORMAL
LAB AP CLINICAL INFORMATION: NORMAL
LAB AP DIAGNOSIS COMMENT: NORMAL
LAB AP OUTSIDE REPORT, ADDENDUM 2: NORMAL
LAB AP OUTSIDE REPORT, ADDENDUM: NORMAL
LAB AP SPECIAL STAINS: NORMAL
Lab: NORMAL
Lab: NORMAL
PATH REPORT.ADDENDUM SPEC: NORMAL
PATH REPORT.FINAL DX SPEC: NORMAL
PATH REPORT.GROSS SPEC: NORMAL

## (undated) DEVICE — TRAJ GUIDE KIT, 9733065, BIOPSY, EXT

## (undated) DEVICE — IRRIGATOR BULB ASEPTO 60CC STRL

## (undated) DEVICE — SUT VICYL 2/0 CR8 18IN DYED J726D

## (undated) DEVICE — BLANKT WARM LOWR/BDY 100X120CM

## (undated) DEVICE — SYR CONTRL PRESS/LO FIX/M/LL W/THMB/RNG 10ML

## (undated) DEVICE — SPNG GZ WOVN 4X4IN 12PLY 10/BX STRL

## (undated) DEVICE — APPL DURAPREP IODOPHOR APL 26ML

## (undated) DEVICE — ELECTRD BLD EZ CLN MOD XLNG 2.75IN

## (undated) DEVICE — GLV SURG PREMIERPRO MIC LTX PF SZ7 BRN

## (undated) DEVICE — PERFORATOR CRANI 14MM 11MM

## (undated) DEVICE — GLV SURG BIOGEL LTX PF 8

## (undated) DEVICE — SUT NUROLON 4/0 TF18 CR8 I8IN C584D

## (undated) DEVICE — ANTIBACTERIAL UNDYED BRAIDED (POLYGLACTIN 910), SYNTHETIC ABSORBABLE SUTURE: Brand: COATED VICRYL

## (undated) DEVICE — DISPOSABLE BIPOLAR FORCEPS 7 3/4" (19.7CM) SCOVILLE BAYONET, INSULATED, 1.5MM TIP AND 12 FT. (3.6M) CABLE: Brand: KIRWAN

## (undated) DEVICE — ELECTRD NDL EZ CLN MOD 2.75IN

## (undated) DEVICE — DRSNG WND GZ PAD BORDERED 4X8IN STRL

## (undated) DEVICE — GLV SURG PREMIERPRO MIC LTX PF SZ6.5 BRN

## (undated) DEVICE — UNDERGLV SURG BIOGEL INDICAT PI SZ8.5 BLU

## (undated) DEVICE — BATRY SCRWDRVR PRECHARGED LITH 1P/U STRL

## (undated) DEVICE — MAYFIELD® DISPOSABLE ADULT SKULL PIN (PLASTIC BASE): Brand: MAYFIELD®

## (undated) DEVICE — BIOPSY NEEDLE KIT 9733068 PASSIVE

## (undated) DEVICE — PK NEURO DISC 10

## (undated) DEVICE — DISH PETRI 3.5IN MD STRL LF

## (undated) DEVICE — SYR LL TP 10ML STRL